# Patient Record
Sex: MALE | Race: WHITE | NOT HISPANIC OR LATINO | Employment: FULL TIME | URBAN - METROPOLITAN AREA
[De-identification: names, ages, dates, MRNs, and addresses within clinical notes are randomized per-mention and may not be internally consistent; named-entity substitution may affect disease eponyms.]

---

## 2022-07-18 ENCOUNTER — TELEPHONE (OUTPATIENT)
Dept: ADMINISTRATIVE | Facility: HOSPITAL | Age: 57
End: 2022-07-18

## 2022-07-18 DIAGNOSIS — Z00.00 WELLNESS EXAMINATION: Primary | ICD-10-CM

## 2022-07-18 NOTE — TELEPHONE ENCOUNTER
Type:  Needs Medical Advice    Who Called: self  Symptoms (please be specific): na   How long has patient had these symptoms:  na  Pharmacy name and phone #:  na  Would the patient rather a call back or a response via [facility]? Call back  Best Call Back Number: [phone]  Additional Information: np appt need labs

## 2022-08-09 ENCOUNTER — LAB VISIT (OUTPATIENT)
Dept: LAB | Facility: HOSPITAL | Age: 57
End: 2022-08-09
Attending: NURSE PRACTITIONER
Payer: MEDICAID

## 2022-08-09 DIAGNOSIS — Z00.00 WELLNESS EXAMINATION: ICD-10-CM

## 2022-08-09 LAB
ALBUMIN SERPL-MCNC: 3.6 GM/DL (ref 3.5–5)
ALBUMIN/GLOB SERPL: 0.9 RATIO (ref 1.1–2)
ALP SERPL-CCNC: 140 UNIT/L (ref 40–150)
ALT SERPL-CCNC: 26 UNIT/L (ref 0–55)
AST SERPL-CCNC: 17 UNIT/L (ref 5–34)
BASOPHILS # BLD AUTO: 0.03 X10(3)/MCL (ref 0–0.2)
BASOPHILS NFR BLD AUTO: 0.3 %
BILIRUBIN DIRECT+TOT PNL SERPL-MCNC: 0.2 MG/DL
BUN SERPL-MCNC: 15.2 MG/DL (ref 8.4–25.7)
CALCIUM SERPL-MCNC: 9.2 MG/DL (ref 8.4–10.2)
CHLORIDE SERPL-SCNC: 107 MMOL/L (ref 98–107)
CHOLEST SERPL-MCNC: 279 MG/DL
CHOLEST/HDLC SERPL: 9 {RATIO} (ref 0–5)
CO2 SERPL-SCNC: 21 MMOL/L (ref 22–29)
CREAT SERPL-MCNC: 0.72 MG/DL (ref 0.73–1.18)
DEPRECATED CALCIDIOL+CALCIFEROL SERPL-MC: 20.7 NG/ML (ref 30–80)
EOSINOPHIL # BLD AUTO: 0.37 X10(3)/MCL (ref 0–0.9)
EOSINOPHIL NFR BLD AUTO: 3.3 %
ERYTHROCYTE [DISTWIDTH] IN BLOOD BY AUTOMATED COUNT: 11.8 % (ref 11.5–17)
EST. AVERAGE GLUCOSE BLD GHB EST-MCNC: 99.7 MG/DL
GFR SERPLBLD CREATININE-BSD FMLA CKD-EPI: >60 MLS/MIN/1.73/M2
GLOBULIN SER-MCNC: 3.8 GM/DL (ref 2.4–3.5)
GLUCOSE SERPL-MCNC: 108 MG/DL (ref 74–100)
HBA1C MFR BLD: 5.1 %
HCT VFR BLD AUTO: 50 % (ref 42–52)
HDLC SERPL-MCNC: 31 MG/DL (ref 35–60)
HGB BLD-MCNC: 16.5 GM/DL (ref 14–18)
IMM GRANULOCYTES # BLD AUTO: 0.03 X10(3)/MCL (ref 0–0.04)
IMM GRANULOCYTES NFR BLD AUTO: 0.3 %
LDLC SERPL CALC-MCNC: 102 MG/DL (ref 50–140)
LYMPHOCYTES # BLD AUTO: 3.23 X10(3)/MCL (ref 0.6–4.6)
LYMPHOCYTES NFR BLD AUTO: 28.8 %
MCH RBC QN AUTO: 30.2 PG (ref 27–31)
MCHC RBC AUTO-ENTMCNC: 33 MG/DL (ref 33–36)
MCV RBC AUTO: 91.4 FL (ref 80–94)
MONOCYTES # BLD AUTO: 0.86 X10(3)/MCL (ref 0.1–1.3)
MONOCYTES NFR BLD AUTO: 7.7 %
NEUTROPHILS # BLD AUTO: 6.7 X10(3)/MCL (ref 2.1–9.2)
NEUTROPHILS NFR BLD AUTO: 59.6 %
PLATELET # BLD AUTO: 377 X10(3)/MCL (ref 130–400)
PMV BLD AUTO: 9.6 FL (ref 7.4–10.4)
POTASSIUM SERPL-SCNC: 4.2 MMOL/L (ref 3.5–5.1)
PROT SERPL-MCNC: 7.4 GM/DL (ref 6.4–8.3)
RBC # BLD AUTO: 5.47 X10(6)/MCL (ref 4.7–6.1)
SODIUM SERPL-SCNC: 143 MMOL/L (ref 136–145)
TRIGL SERPL-MCNC: 731 MG/DL (ref 34–140)
TSH SERPL-ACNC: 0.95 UIU/ML (ref 0.35–4.94)
VLDLC SERPL CALC-MCNC: 146 MG/DL
WBC # SPEC AUTO: 11.2 X10(3)/MCL (ref 4.5–11.5)

## 2022-08-09 PROCEDURE — 80053 COMPREHEN METABOLIC PANEL: CPT

## 2022-08-09 PROCEDURE — 82306 VITAMIN D 25 HYDROXY: CPT

## 2022-08-09 PROCEDURE — 85025 COMPLETE CBC W/AUTO DIFF WBC: CPT

## 2022-08-09 PROCEDURE — 83036 HEMOGLOBIN GLYCOSYLATED A1C: CPT

## 2022-08-09 PROCEDURE — 36415 COLL VENOUS BLD VENIPUNCTURE: CPT

## 2022-08-09 PROCEDURE — 80061 LIPID PANEL: CPT

## 2022-08-09 PROCEDURE — 84443 ASSAY THYROID STIM HORMONE: CPT

## 2022-08-11 ENCOUNTER — OFFICE VISIT (OUTPATIENT)
Dept: FAMILY MEDICINE | Facility: CLINIC | Age: 57
End: 2022-08-11
Payer: MEDICAID

## 2022-08-11 VITALS
WEIGHT: 165 LBS | OXYGEN SATURATION: 99 % | RESPIRATION RATE: 16 BRPM | HEART RATE: 85 BPM | SYSTOLIC BLOOD PRESSURE: 135 MMHG | DIASTOLIC BLOOD PRESSURE: 79 MMHG | BODY MASS INDEX: 26.52 KG/M2 | HEIGHT: 66 IN | TEMPERATURE: 98 F

## 2022-08-11 DIAGNOSIS — E78.00 HYPERCHOLESTEREMIA: ICD-10-CM

## 2022-08-11 DIAGNOSIS — Z12.12 ENCOUNTER FOR COLORECTAL CANCER SCREENING: Primary | ICD-10-CM

## 2022-08-11 DIAGNOSIS — E78.1 HYPERTRIGLYCERIDEMIA: ICD-10-CM

## 2022-08-11 DIAGNOSIS — Z12.11 ENCOUNTER FOR COLORECTAL CANCER SCREENING: Primary | ICD-10-CM

## 2022-08-11 DIAGNOSIS — Z00.00 WELLNESS EXAMINATION: ICD-10-CM

## 2022-08-11 DIAGNOSIS — I10 HYPERTENSION, UNSPECIFIED TYPE: ICD-10-CM

## 2022-08-11 DIAGNOSIS — F32.A DEPRESSION, UNSPECIFIED DEPRESSION TYPE: ICD-10-CM

## 2022-08-11 DIAGNOSIS — Z76.89 ENCOUNTER TO ESTABLISH CARE: ICD-10-CM

## 2022-08-11 PROCEDURE — 3008F BODY MASS INDEX DOCD: CPT | Mod: CPTII,,, | Performed by: NURSE PRACTITIONER

## 2022-08-11 PROCEDURE — 3075F SYST BP GE 130 - 139MM HG: CPT | Mod: CPTII,,, | Performed by: NURSE PRACTITIONER

## 2022-08-11 PROCEDURE — 99386 PREV VISIT NEW AGE 40-64: CPT | Mod: ,,, | Performed by: NURSE PRACTITIONER

## 2022-08-11 PROCEDURE — 3078F PR MOST RECENT DIASTOLIC BLOOD PRESSURE < 80 MM HG: ICD-10-PCS | Mod: CPTII,,, | Performed by: NURSE PRACTITIONER

## 2022-08-11 PROCEDURE — 3008F PR BODY MASS INDEX (BMI) DOCUMENTED: ICD-10-PCS | Mod: CPTII,,, | Performed by: NURSE PRACTITIONER

## 2022-08-11 PROCEDURE — 3075F PR MOST RECENT SYSTOLIC BLOOD PRESS GE 130-139MM HG: ICD-10-PCS | Mod: CPTII,,, | Performed by: NURSE PRACTITIONER

## 2022-08-11 PROCEDURE — 3078F DIAST BP <80 MM HG: CPT | Mod: CPTII,,, | Performed by: NURSE PRACTITIONER

## 2022-08-11 PROCEDURE — 99386 PR PREVENTIVE VISIT,NEW,40-64: ICD-10-PCS | Mod: ,,, | Performed by: NURSE PRACTITIONER

## 2022-08-11 RX ORDER — SIMVASTATIN 40 MG/1
40 TABLET, FILM COATED ORAL NIGHTLY
Qty: 30 TABLET | Refills: 11 | Status: SHIPPED | OUTPATIENT
Start: 2022-08-11

## 2022-08-11 RX ORDER — FENOFIBRATE 160 MG/1
160 TABLET ORAL DAILY
Qty: 90 TABLET | Refills: 3 | Status: SHIPPED | OUTPATIENT
Start: 2022-08-11 | End: 2023-09-07

## 2022-08-11 RX ORDER — METOPROLOL TARTRATE 25 MG/1
12.5 TABLET, FILM COATED ORAL DAILY
Qty: 30 TABLET | Refills: 11 | Status: SHIPPED | OUTPATIENT
Start: 2022-08-11

## 2022-08-11 RX ORDER — SIMVASTATIN 40 MG/1
1 TABLET, FILM COATED ORAL NIGHTLY
COMMUNITY
Start: 2022-06-14 | End: 2022-08-11 | Stop reason: SDUPTHER

## 2022-08-11 RX ORDER — METOPROLOL TARTRATE 25 MG/1
12.5 TABLET, FILM COATED ORAL DAILY
COMMUNITY
Start: 2022-06-14 | End: 2022-08-11 | Stop reason: SDUPTHER

## 2022-08-11 RX ORDER — ASPIRIN 81 MG/1
81 TABLET ORAL DAILY
COMMUNITY

## 2022-08-11 NOTE — PROGRESS NOTES
Of the fall are history Subjective:       Patient ID: Kimani Redd is a 57 y.o. male.    Chief Complaint: Establish Care and Cyst (To JUAN MANUEL MENG, present for several years. Painless.)    This is a 57-year-old male who presents to the clinic to establish care.  Patient has a past medical history of CAD, mi with stenting hyper lipidemia, hypertension.  Patient has a past surgical history of back surgery.  Patient reports some depression due to family concerns.  Denies any suicidal or homicidal ideations.    Review of Systems   Constitutional: Negative.    HENT: Negative.    Eyes: Negative.    Respiratory: Negative.    Cardiovascular: Negative.    Gastrointestinal: Negative.    Endocrine: Negative.    Genitourinary: Negative.    Musculoskeletal: Positive for back pain.   Integumentary:  Negative.   Allergic/Immunologic: Negative.    Neurological: Negative.    Hematological: Negative.    Psychiatric/Behavioral: Positive for dysphoric mood.         Objective:      Physical Exam  Vitals and nursing note reviewed.   Constitutional:       Appearance: Normal appearance.   HENT:      Head: Normocephalic and atraumatic.      Right Ear: Ear canal and external ear normal.      Left Ear: Ear canal and external ear normal.      Nose: Nose normal.      Mouth/Throat:      Mouth: Mucous membranes are moist.      Pharynx: Oropharynx is clear.   Eyes:      Extraocular Movements: Extraocular movements intact.      Conjunctiva/sclera: Conjunctivae normal.      Pupils: Pupils are equal, round, and reactive to light.   Cardiovascular:      Rate and Rhythm: Normal rate and regular rhythm.      Pulses: Normal pulses.      Heart sounds: Normal heart sounds.   Pulmonary:      Effort: Pulmonary effort is normal.      Breath sounds: Normal breath sounds.   Abdominal:      General: Abdomen is flat. Bowel sounds are normal.      Palpations: Abdomen is soft.   Musculoskeletal:         General: Normal range of motion.      Cervical back: Normal range of  motion and neck supple.   Skin:     General: Skin is warm and dry.      Capillary Refill: Capillary refill takes less than 2 seconds.   Neurological:      General: No focal deficit present.      Mental Status: He is alert and oriented to person, place, and time. Mental status is at baseline.   Psychiatric:         Mood and Affect: Mood normal.         Behavior: Behavior normal.         Thought Content: Thought content normal.         Judgment: Judgment normal.         Results for orders placed or performed in visit on 08/09/22   Comprehensive Metabolic Panel   Result Value Ref Range    Sodium Level 143 136 - 145 mmol/L    Potassium Level 4.2 3.5 - 5.1 mmol/L    Chloride 107 98 - 107 mmol/L    Carbon Dioxide 21 (L) 22 - 29 mmol/L    Glucose Level 108 (H) 74 - 100 mg/dL    Blood Urea Nitrogen 15.2 8.4 - 25.7 mg/dL    Creatinine 0.72 (L) 0.73 - 1.18 mg/dL    Calcium Level Total 9.2 8.4 - 10.2 mg/dL    Protein Total 7.4 6.4 - 8.3 gm/dL    Albumin Level 3.6 3.5 - 5.0 gm/dL    Globulin 3.8 (H) 2.4 - 3.5 gm/dL    Albumin/Globulin Ratio 0.9 (L) 1.1 - 2.0 ratio    Bilirubin Total 0.2 <=1.5 mg/dL    Alkaline Phosphatase 140 40 - 150 unit/L    Alanine Aminotransferase 26 0 - 55 unit/L    Aspartate Aminotransferase 17 5 - 34 unit/L    eGFR >60 mls/min/1.73/m2   Lipid Panel   Result Value Ref Range    Cholesterol Total 279 (H) <=200 mg/dL    HDL Cholesterol 31 (L) 35 - 60 mg/dL    Triglyceride 731 (H) 34 - 140 mg/dL    Cholesterol/HDL Ratio 9 (H) 0 - 5    Very Low Density Lipoprotein 146     LDL Cholesterol 102.00 50.00 - 140.00 mg/dL   TSH   Result Value Ref Range    Thyroid Stimulating Hormone 0.9500 0.3500 - 4.9400 uIU/mL   Vitamin D   Result Value Ref Range    Vit D 25 OH 20.7 (L) 30.0 - 80.0 ng/mL   Hemoglobin A1C   Result Value Ref Range    Hemoglobin A1c 5.1 <=7.0 %    Estimated Average Glucose 99.7 mg/dL   CBC with Differential   Result Value Ref Range    WBC 11.2 4.5 - 11.5 x10(3)/mcL    RBC 5.47 4.70 - 6.10 x10(6)/mcL     Hgb 16.5 14.0 - 18.0 gm/dL    Hct 50.0 42.0 - 52.0 %    MCV 91.4 80.0 - 94.0 fL    MCH 30.2 27.0 - 31.0 pg    MCHC 33.0 33.0 - 36.0 mg/dL    RDW 11.8 11.5 - 17.0 %    Platelet 377 130 - 400 x10(3)/mcL    MPV 9.6 7.4 - 10.4 fL    Neut % 59.6 %    Lymph % 28.8 %    Mono % 7.7 %    Eos % 3.3 %    Basophil % 0.3 %    Lymph # 3.23 0.6 - 4.6 x10(3)/mcL    Neut # 6.7 2.1 - 9.2 x10(3)/mcL    Mono # 0.86 0.1 - 1.3 x10(3)/mcL    Eos # 0.37 0 - 0.9 x10(3)/mcL    Baso # 0.03 0 - 0.2 x10(3)/mcL    IG# 0.03 0 - 0.04 x10(3)/mcL    IG% 0.3 %       Assessment:       Problem List Items Addressed This Visit        Psychiatric    Depression     Patient declines counseling or medication management at this time.  Prefers to cope naturally.  Positive support system encouraged.  Instructed patient to report to ED with any suicidal or homicidal ideations.              Cardiac/Vascular    Hypercholesteremia    Hypertriglyceridemia     Fenofibrate 160 mg p.o. daily sent to pharmacy.  Low-fat, low-cholesterol diet advised.           Hypertension     Blood pressure 135/79.  Dash diet advised.  Continue current management.              Other    Encounter to establish care    Wellness examination     Healthy lifestyle discussed. Cloguard ordered.             Other Visit Diagnoses     Encounter for colorectal cancer screening    -  Primary    Relevant Orders    Cologuard Screening (Multitarget Stool DNA)          Plan:       RTC in 3 months. Lipid panel to be completed prior to appt.

## 2022-08-11 NOTE — ASSESSMENT & PLAN NOTE
Patient declines counseling or medication management at this time.  Prefers to cope naturally.  Positive support system encouraged.  Instructed patient to report to ED with any suicidal or homicidal ideations.

## 2022-11-08 DIAGNOSIS — E78.5 HYPERLIPIDEMIA, UNSPECIFIED HYPERLIPIDEMIA TYPE: Primary | ICD-10-CM

## 2022-11-14 PROBLEM — Z00.00 WELLNESS EXAMINATION: Status: RESOLVED | Noted: 2022-08-11 | Resolved: 2022-11-14

## 2023-06-13 ENCOUNTER — TELEPHONE (OUTPATIENT)
Dept: FAMILY MEDICINE | Facility: CLINIC | Age: 58
End: 2023-06-13
Payer: MEDICAID

## 2023-06-13 DIAGNOSIS — Z00.00 WELLNESS EXAMINATION: Primary | ICD-10-CM

## 2023-09-02 ENCOUNTER — LAB VISIT (OUTPATIENT)
Dept: LAB | Facility: HOSPITAL | Age: 58
End: 2023-09-02
Attending: NURSE PRACTITIONER
Payer: MEDICAID

## 2023-09-02 DIAGNOSIS — E78.5 HYPERLIPIDEMIA, UNSPECIFIED HYPERLIPIDEMIA TYPE: ICD-10-CM

## 2023-09-02 DIAGNOSIS — Z00.00 WELLNESS EXAMINATION: ICD-10-CM

## 2023-09-02 LAB
ALBUMIN SERPL-MCNC: 4 G/DL (ref 3.5–5)
ALBUMIN/GLOB SERPL: 1.1 RATIO (ref 1.1–2)
ALP SERPL-CCNC: 94 UNIT/L (ref 40–150)
ALT SERPL-CCNC: 19 UNIT/L (ref 0–55)
AST SERPL-CCNC: 19 UNIT/L (ref 5–34)
BASOPHILS # BLD AUTO: 0.03 X10(3)/MCL
BASOPHILS NFR BLD AUTO: 0.2 %
BILIRUB SERPL-MCNC: 0.3 MG/DL
BUN SERPL-MCNC: 22.1 MG/DL (ref 8.4–25.7)
CALCIUM SERPL-MCNC: 9.5 MG/DL (ref 8.4–10.2)
CHLORIDE SERPL-SCNC: 106 MMOL/L (ref 98–107)
CHOLEST SERPL-MCNC: 218 MG/DL
CHOLEST/HDLC SERPL: 3 {RATIO} (ref 0–5)
CO2 SERPL-SCNC: 23 MMOL/L (ref 22–29)
CREAT SERPL-MCNC: 1.05 MG/DL (ref 0.73–1.18)
DEPRECATED CALCIDIOL+CALCIFEROL SERPL-MC: 22 NG/ML (ref 30–80)
EOSINOPHIL # BLD AUTO: 0.27 X10(3)/MCL (ref 0–0.9)
EOSINOPHIL NFR BLD AUTO: 1.7 %
ERYTHROCYTE [DISTWIDTH] IN BLOOD BY AUTOMATED COUNT: 12.4 % (ref 11.5–17)
EST. AVERAGE GLUCOSE BLD GHB EST-MCNC: 96.8 MG/DL
GFR SERPLBLD CREATININE-BSD FMLA CKD-EPI: >60 MLS/MIN/1.73/M2
GLOBULIN SER-MCNC: 3.6 GM/DL (ref 2.4–3.5)
GLUCOSE SERPL-MCNC: 99 MG/DL (ref 74–100)
HBA1C MFR BLD: 5 %
HCT VFR BLD AUTO: 49.8 % (ref 42–52)
HDLC SERPL-MCNC: 76 MG/DL (ref 35–60)
HGB BLD-MCNC: 16 G/DL (ref 14–18)
IMM GRANULOCYTES # BLD AUTO: 0.02 X10(3)/MCL (ref 0–0.04)
IMM GRANULOCYTES NFR BLD AUTO: 0.1 %
LDLC SERPL CALC-MCNC: 119 MG/DL (ref 50–140)
LYMPHOCYTES # BLD AUTO: 3.16 X10(3)/MCL (ref 0.6–4.6)
LYMPHOCYTES NFR BLD AUTO: 20.2 %
MCH RBC QN AUTO: 29 PG (ref 27–31)
MCHC RBC AUTO-ENTMCNC: 32.1 G/DL (ref 33–36)
MCV RBC AUTO: 90.4 FL (ref 80–94)
MONOCYTES # BLD AUTO: 1.12 X10(3)/MCL (ref 0.1–1.3)
MONOCYTES NFR BLD AUTO: 7.2 %
NEUTROPHILS # BLD AUTO: 11.05 X10(3)/MCL (ref 2.1–9.2)
NEUTROPHILS NFR BLD AUTO: 70.6 %
PLATELET # BLD AUTO: 455 X10(3)/MCL (ref 130–400)
PMV BLD AUTO: 9.8 FL (ref 7.4–10.4)
POTASSIUM SERPL-SCNC: 4.4 MMOL/L (ref 3.5–5.1)
PROT SERPL-MCNC: 7.6 GM/DL (ref 6.4–8.3)
PSA SERPL-MCNC: 0.83 NG/ML
RBC # BLD AUTO: 5.51 X10(6)/MCL (ref 4.7–6.1)
SODIUM SERPL-SCNC: 138 MMOL/L (ref 136–145)
TRIGL SERPL-MCNC: 116 MG/DL (ref 34–140)
TSH SERPL-ACNC: 0.85 UIU/ML (ref 0.35–4.94)
VLDLC SERPL CALC-MCNC: 23 MG/DL
WBC # SPEC AUTO: 15.65 X10(3)/MCL (ref 4.5–11.5)

## 2023-09-02 PROCEDURE — 82306 VITAMIN D 25 HYDROXY: CPT

## 2023-09-02 PROCEDURE — 84153 ASSAY OF PSA TOTAL: CPT

## 2023-09-02 PROCEDURE — 83036 HEMOGLOBIN GLYCOSYLATED A1C: CPT

## 2023-09-02 PROCEDURE — 85025 COMPLETE CBC W/AUTO DIFF WBC: CPT

## 2023-09-02 PROCEDURE — 80061 LIPID PANEL: CPT

## 2023-09-02 PROCEDURE — 84443 ASSAY THYROID STIM HORMONE: CPT

## 2023-09-02 PROCEDURE — 36415 COLL VENOUS BLD VENIPUNCTURE: CPT

## 2023-09-02 PROCEDURE — 80053 COMPREHEN METABOLIC PANEL: CPT

## 2023-09-07 ENCOUNTER — OFFICE VISIT (OUTPATIENT)
Dept: FAMILY MEDICINE | Facility: CLINIC | Age: 58
End: 2023-09-07
Payer: MEDICAID

## 2023-09-07 VITALS
BODY MASS INDEX: 25.36 KG/M2 | WEIGHT: 157.81 LBS | SYSTOLIC BLOOD PRESSURE: 119 MMHG | HEART RATE: 67 BPM | HEIGHT: 66 IN | TEMPERATURE: 98 F | OXYGEN SATURATION: 99 % | RESPIRATION RATE: 18 BRPM | DIASTOLIC BLOOD PRESSURE: 70 MMHG

## 2023-09-07 DIAGNOSIS — M19.90 ARTHRITIS: ICD-10-CM

## 2023-09-07 DIAGNOSIS — I10 HYPERTENSION, UNSPECIFIED TYPE: ICD-10-CM

## 2023-09-07 DIAGNOSIS — Z12.12 SCREENING FOR COLORECTAL CANCER: ICD-10-CM

## 2023-09-07 DIAGNOSIS — Z13.39 ADHD (ATTENTION DEFICIT HYPERACTIVITY DISORDER) EVALUATION: Primary | ICD-10-CM

## 2023-09-07 DIAGNOSIS — E78.00 HYPERCHOLESTEREMIA: ICD-10-CM

## 2023-09-07 DIAGNOSIS — D72.829 LEUKOCYTOSIS, UNSPECIFIED TYPE: ICD-10-CM

## 2023-09-07 DIAGNOSIS — Z00.00 WELL ADULT EXAM: ICD-10-CM

## 2023-09-07 DIAGNOSIS — Z12.11 SCREENING FOR COLORECTAL CANCER: ICD-10-CM

## 2023-09-07 PROCEDURE — 1159F MED LIST DOCD IN RCRD: CPT | Mod: CPTII,,, | Performed by: NURSE PRACTITIONER

## 2023-09-07 PROCEDURE — 99212 PR OFFICE/OUTPT VISIT, EST, LEVL II, 10-19 MIN: ICD-10-PCS | Mod: 25,,, | Performed by: NURSE PRACTITIONER

## 2023-09-07 PROCEDURE — 1160F RVW MEDS BY RX/DR IN RCRD: CPT | Mod: CPTII,,, | Performed by: NURSE PRACTITIONER

## 2023-09-07 PROCEDURE — 99212 OFFICE O/P EST SF 10 MIN: CPT | Mod: 25,,, | Performed by: NURSE PRACTITIONER

## 2023-09-07 PROCEDURE — 3008F PR BODY MASS INDEX (BMI) DOCUMENTED: ICD-10-PCS | Mod: CPTII,,, | Performed by: NURSE PRACTITIONER

## 2023-09-07 PROCEDURE — 3008F BODY MASS INDEX DOCD: CPT | Mod: CPTII,,, | Performed by: NURSE PRACTITIONER

## 2023-09-07 PROCEDURE — 3044F HG A1C LEVEL LT 7.0%: CPT | Mod: CPTII,,, | Performed by: NURSE PRACTITIONER

## 2023-09-07 PROCEDURE — 3044F PR MOST RECENT HEMOGLOBIN A1C LEVEL <7.0%: ICD-10-PCS | Mod: CPTII,,, | Performed by: NURSE PRACTITIONER

## 2023-09-07 PROCEDURE — 1160F PR REVIEW ALL MEDS BY PRESCRIBER/CLIN PHARMACIST DOCUMENTED: ICD-10-PCS | Mod: CPTII,,, | Performed by: NURSE PRACTITIONER

## 2023-09-07 PROCEDURE — 3074F SYST BP LT 130 MM HG: CPT | Mod: CPTII,,, | Performed by: NURSE PRACTITIONER

## 2023-09-07 PROCEDURE — 3074F PR MOST RECENT SYSTOLIC BLOOD PRESSURE < 130 MM HG: ICD-10-PCS | Mod: CPTII,,, | Performed by: NURSE PRACTITIONER

## 2023-09-07 PROCEDURE — 3078F DIAST BP <80 MM HG: CPT | Mod: CPTII,,, | Performed by: NURSE PRACTITIONER

## 2023-09-07 PROCEDURE — 99396 PR PREVENTIVE VISIT,EST,40-64: ICD-10-PCS | Mod: ,,, | Performed by: NURSE PRACTITIONER

## 2023-09-07 PROCEDURE — 3078F PR MOST RECENT DIASTOLIC BLOOD PRESSURE < 80 MM HG: ICD-10-PCS | Mod: CPTII,,, | Performed by: NURSE PRACTITIONER

## 2023-09-07 PROCEDURE — 1159F PR MEDICATION LIST DOCUMENTED IN MEDICAL RECORD: ICD-10-PCS | Mod: CPTII,,, | Performed by: NURSE PRACTITIONER

## 2023-09-07 PROCEDURE — 99396 PREV VISIT EST AGE 40-64: CPT | Mod: ,,, | Performed by: NURSE PRACTITIONER

## 2023-09-07 RX ORDER — MELOXICAM 15 MG/1
15 TABLET ORAL DAILY
Qty: 30 TABLET | Refills: 6 | Status: SHIPPED | OUTPATIENT
Start: 2023-09-07

## 2023-09-07 RX ORDER — ASPIRIN 325 MG
50000 TABLET, DELAYED RELEASE (ENTERIC COATED) ORAL WEEKLY
Qty: 12 CAPSULE | Refills: 0 | Status: SHIPPED | OUTPATIENT
Start: 2023-09-07 | End: 2023-11-24

## 2023-09-07 NOTE — PROGRESS NOTES
Patient ID: [id number]     Chief Complaint: Annual Exam      HPI:   Kimani Redd is a 58 y.o. male here today for an annual wellness visit.  Presents with complaints of inability to focus and complete tasks in a timely manner while at work.  Patient also presents with complaints of bilateral elbow pain.  Patient relates pain due to previous all feel work.  Denies any trauma or injury.  Pain is worse with movement, relieved with rest.  Pain is relieved with ibuprofen OTC.      Health Maintenance   Topic Date Due    Hepatitis C Screening  Never done    TETANUS VACCINE  Never done    Colorectal Cancer Screening  Never done    Shingles Vaccine (1 of 2) Never done    Lipid Panel  09/02/2028     Dental Exam - Recommend biannually  Vaccinations -   There is no immunization history on file for this patient.     Past Medical History:  has no past medical history on file.    Surgical History:  has a past surgical history that includes Carotid stent.    Family History: family history is not on file.    Social History:  reports that he has been smoking cigarettes. He has never used smokeless tobacco. He reports that he does not drink alcohol and does not use drugs.    Current Outpatient Medications   Medication Instructions    aspirin (ECOTRIN) 81 mg, Oral, Daily    cholecalciferol (vitamin D3) 50,000 Units, Oral, Weekly    fenofibrate 160 mg, Oral, Daily    meloxicam (MOBIC) 15 mg, Oral, Daily    metoprolol tartrate (LOPRESSOR) 12.5 mg, Oral, Daily    simvastatin (ZOCOR) 40 mg, Oral, Nightly       Patient has No Known Allergies.     Patient Care Team:  Jessi Leyva FNP as PCP - General (Nurse Practitioner)       Subjective:     Review of Systems    12 point review of systems conducted, negative except as stated in the history of present illness. See HPI for details.      Objective:     Visit Vitals  /70 (BP Location: Left arm, Patient Position: Sitting)   Pulse 67   Temp 97.7 °F (36.5 °C) (Oral)   Resp 18   Ht  "5' 6" (1.676 m)   Wt 71.6 kg (157 lb 12.8 oz)   SpO2 99%   BMI 25.47 kg/m²       Physical Exam    General: Alert and oriented, No acute distress.  Head: Normocephalic, Atraumatic.  Eye: Pupils are equal, round and reactive to light, Extraocular movements are intact, Sclera non-icteric.  Ears/Nose/Throat: Normal, Mucosa moist,Clear.  Neck/Thyroid: Supple, Non-tender, No carotid bruit, No lymphadenopathy, No JVD, Full range of motion.  Respiratory: Clear to auscultation bilaterally; No wheezes, rales or rhonchi,Non-labored respirations, Symmetrical chest wall expansion.  Cardiovascular: Regular rate and rhythm, S1/S2 normal, No murmurs, rubs or gallops.  Gastrointestinal: Soft, Non-tender, Non-distended, Normal bowel sounds, No palpable organomegaly.  Musculoskeletal: Normal range of motion.  Integumentary: Warm, Dry, Intact, No suspicious lesions or rashes.  Extremities: No clubbing, cyanosis or edema  Neurologic: No focal deficits, Cranial Nerves II-XII are grossly intact, Motor strength normal upper and lower extremities, Sensory exam intact.  Psychiatric: Anxious, Coherent speech, Euthymic mood, Appropriate affect     Labs Reviewed:     Chemistry:  Lab Results   Component Value Date     09/02/2023    K 4.4 09/02/2023    CHLORIDE 106 09/02/2023    BUN 22.1 09/02/2023    CREATININE 1.05 09/02/2023    EGFRNORACEVR >60 09/02/2023    GLUCOSE 99 09/02/2023    CALCIUM 9.5 09/02/2023    ALKPHOS 94 09/02/2023    LABPROT 7.6 09/02/2023    ALBUMIN 4.0 09/02/2023    AST 19 09/02/2023    ALT 19 09/02/2023    [id number] 22.0 (L) 09/02/2023    TSH 0.851 09/02/2023    PSA 0.83 09/02/2023        Lab Results   Component Value Date    HGBA1C 5.0 09/02/2023        Hematology:  Lab Results   Component Value Date    WBC 15.65 (H) 09/02/2023    HGB 16.0 09/02/2023    HCT 49.8 09/02/2023     (H) 09/02/2023       Lipid Panel:  Lab Results   Component Value Date    CHOL 218 (H) 09/02/2023    HDL 76 (H) 09/02/2023    LDL " "119.00 09/02/2023    TRIG 116 09/02/2023    TOTALCHOLEST 3 09/02/2023        Urine:  No results found for: "COLORUA", "APPEARANCEUA", "SGUA", "PHUA", "PROTEINUA", "GLUCOSEUA", "KETONESUA", "BLOODUA", "NITRITESUA", "LEUKOCYTESUR", "RBCUA", "WBCUA", "BACTERIA", "SQEPUA", "HYALINECASTS", "CREATRANDUR", "PROTEINURINE", "UPROTCREA"       Assessment:       ICD-10-CM ICD-9-CM   1. Well adult exam  Z00.00 V70.0   2. Hypertension, unspecified type  I10 401.9   3. Hypercholesteremia  E78.00 272.0   4. ADHD (attention deficit hyperactivity disorder) evaluation  Z13.39 V79.8   5. Leukocytosis, unspecified type  D72.829 288.60   6. Screening for colorectal cancer  Z12.11 V76.51    Z12.12 V76.41        Plan:   1. Well adult exam  Healthy lifestyle discussed.  Positive support system encouraged.    2. Hypertension, unspecified type  Well controlled.   Low Sodium Diet (DASH Diet - Less than 2 grams of sodium per day).  Monitor blood pressure daily and log. Report consistent numbers greater than 140/90.  Maintain healthy weight with goal BMI <30. Exercise 30 minutes per day, 5 days per week.  Smoking cessation encouraged to aid in BP reduction.    3. Hypercholesteremia  Lab Results   Component Value Date    .00 09/02/2023    TRIG 116 09/02/2023    HDL 76 (H) 09/02/2023    TOTALCHOLEST 3 09/02/2023     Continue   Hyperlipidemia Medications               fenofibrate 160 MG Tab Take 1 tablet (160 mg total) by mouth once daily.    simvastatin (ZOCOR) 40 MG tablet Take 1 tablet (40 mg total) by mouth every evening.        Stressed importance of dietary modifications. Follow a low cholesterol, low saturated fat diet with less that 200mg of cholesterol a day.  Avoid fried foods and high saturated fats (high saturated fats less than 7% of calories).  Add Flax Seed/Fish Oil supplements to diet. Increase dietary fiber.  Regular exercise can reduce LDL and raise HDL. Stressed importance of physical activity 5 times per week for 30 " minutes per day.     4. ADHD (attention deficit hyperactivity disorder) evaluation  Referral sent to Mental Health.    5. Leukocytosis, unspecified type  WBC 15.6.  Patient asymptomatic.  Repeat CBC in 1 week.  - CBC Auto Differential; Future    6. Screening for colorectal cancer  - Cologuard Screening (Multitarget Stool DNA); Future  - Cologuard Screening (Multitarget Stool DNA)    7. Arthritis  Meloxicam 15 mg p.o. daily sent to pharmacy.  Return to clinic if symptoms do not improve.        Follow up in about 6 months (around 3/7/2024). In addition to their scheduled follow up, the patient has also been instructed to follow up on as needed basis.     Future Appointments   Date Time Provider Department Center   3/7/2024  8:00 AM Jessi Leyva FNP [facility]        KAYA Rodriguez

## 2023-10-28 DIAGNOSIS — I10 HYPERTENSION, UNSPECIFIED TYPE: Primary | ICD-10-CM

## 2023-10-28 DIAGNOSIS — E78.00 ELEVATED CHOLESTEROL: ICD-10-CM

## 2023-10-30 RX ORDER — METOPROLOL TARTRATE 25 MG/1
12.5 TABLET, FILM COATED ORAL
Qty: 30 TABLET | Refills: 11 | Status: SHIPPED | OUTPATIENT
Start: 2023-10-30

## 2023-10-30 RX ORDER — FENOFIBRATE 160 MG/1
160 TABLET ORAL
Qty: 90 TABLET | Refills: 3 | Status: SHIPPED | OUTPATIENT
Start: 2023-10-30

## 2023-10-30 RX ORDER — SIMVASTATIN 40 MG/1
40 TABLET, FILM COATED ORAL NIGHTLY
Qty: 30 TABLET | Refills: 11 | Status: SHIPPED | OUTPATIENT
Start: 2023-10-30

## 2024-02-23 DIAGNOSIS — E78.5 HYPERLIPIDEMIA, UNSPECIFIED HYPERLIPIDEMIA TYPE: Primary | ICD-10-CM

## 2024-02-29 ENCOUNTER — LAB VISIT (OUTPATIENT)
Dept: LAB | Facility: HOSPITAL | Age: 59
End: 2024-02-29
Attending: NURSE PRACTITIONER
Payer: MEDICAID

## 2024-02-29 DIAGNOSIS — D72.829 LEUKOCYTOSIS, UNSPECIFIED TYPE: ICD-10-CM

## 2024-02-29 DIAGNOSIS — E78.5 HYPERLIPIDEMIA, UNSPECIFIED HYPERLIPIDEMIA TYPE: ICD-10-CM

## 2024-02-29 LAB
BASOPHILS # BLD AUTO: 0.02 X10(3)/MCL
BASOPHILS NFR BLD AUTO: 0.2 %
CHOLEST SERPL-MCNC: 206 MG/DL
CHOLEST/HDLC SERPL: 5 {RATIO} (ref 0–5)
EOSINOPHIL # BLD AUTO: 0.21 X10(3)/MCL (ref 0–0.9)
EOSINOPHIL NFR BLD AUTO: 2 %
ERYTHROCYTE [DISTWIDTH] IN BLOOD BY AUTOMATED COUNT: 12.5 % (ref 11.5–17)
HCT VFR BLD AUTO: 52.1 % (ref 42–52)
HDLC SERPL-MCNC: 44 MG/DL (ref 35–60)
HGB BLD-MCNC: 16.7 G/DL (ref 14–18)
IMM GRANULOCYTES # BLD AUTO: 0.02 X10(3)/MCL (ref 0–0.04)
IMM GRANULOCYTES NFR BLD AUTO: 0.2 %
LDLC SERPL CALC-MCNC: 131 MG/DL (ref 50–140)
LYMPHOCYTES # BLD AUTO: 2.61 X10(3)/MCL (ref 0.6–4.6)
LYMPHOCYTES NFR BLD AUTO: 25.5 %
MCH RBC QN AUTO: 29.3 PG (ref 27–31)
MCHC RBC AUTO-ENTMCNC: 32.1 G/DL (ref 33–36)
MCV RBC AUTO: 91.4 FL (ref 80–94)
MONOCYTES # BLD AUTO: 1.04 X10(3)/MCL (ref 0.1–1.3)
MONOCYTES NFR BLD AUTO: 10.1 %
NEUTROPHILS # BLD AUTO: 6.35 X10(3)/MCL (ref 2.1–9.2)
NEUTROPHILS NFR BLD AUTO: 62 %
PLATELET # BLD AUTO: 405 X10(3)/MCL (ref 130–400)
PMV BLD AUTO: 10.2 FL (ref 7.4–10.4)
RBC # BLD AUTO: 5.7 X10(6)/MCL (ref 4.7–6.1)
TRIGL SERPL-MCNC: 154 MG/DL (ref 34–140)
VLDLC SERPL CALC-MCNC: 31 MG/DL
WBC # SPEC AUTO: 10.25 X10(3)/MCL (ref 4.5–11.5)

## 2024-02-29 PROCEDURE — 80061 LIPID PANEL: CPT

## 2024-02-29 PROCEDURE — 85025 COMPLETE CBC W/AUTO DIFF WBC: CPT

## 2024-02-29 PROCEDURE — 36415 COLL VENOUS BLD VENIPUNCTURE: CPT

## 2024-05-07 ENCOUNTER — TELEPHONE (OUTPATIENT)
Dept: FAMILY MEDICINE | Facility: CLINIC | Age: 59
End: 2024-05-07
Payer: MEDICAID

## 2024-05-07 DIAGNOSIS — I10 HYPERTENSION, UNSPECIFIED TYPE: Primary | ICD-10-CM

## 2024-05-07 RX ORDER — METOPROLOL TARTRATE 25 MG/1
12.5 TABLET, FILM COATED ORAL DAILY
Qty: 30 TABLET | Refills: 0 | Status: SHIPPED | OUTPATIENT
Start: 2024-05-07

## 2024-05-08 ENCOUNTER — TELEPHONE (OUTPATIENT)
Dept: FAMILY MEDICINE | Facility: CLINIC | Age: 59
End: 2024-05-08
Payer: MEDICAID

## 2024-05-08 DIAGNOSIS — I10 HYPERTENSION, UNSPECIFIED TYPE: ICD-10-CM

## 2024-05-08 RX ORDER — METOPROLOL TARTRATE 25 MG/1
12.5 TABLET, FILM COATED ORAL DAILY
Qty: 30 TABLET | Refills: 0 | Status: CANCELLED | OUTPATIENT
Start: 2024-05-08

## 2024-06-11 DIAGNOSIS — E78.00 HYPERCHOLESTEREMIA: Primary | ICD-10-CM

## 2024-06-22 ENCOUNTER — LAB VISIT (OUTPATIENT)
Dept: LAB | Facility: HOSPITAL | Age: 59
End: 2024-06-22
Attending: NURSE PRACTITIONER
Payer: MEDICAID

## 2024-06-22 DIAGNOSIS — E78.00 HYPERCHOLESTEREMIA: ICD-10-CM

## 2024-06-22 LAB
ALBUMIN SERPL-MCNC: 4 G/DL (ref 3.5–5)
ALBUMIN/GLOB SERPL: 1.3 RATIO (ref 1.1–2)
ALP SERPL-CCNC: 86 UNIT/L (ref 40–150)
ALT SERPL-CCNC: 23 UNIT/L (ref 0–55)
ANION GAP SERPL CALC-SCNC: 7 MEQ/L
AST SERPL-CCNC: 24 UNIT/L (ref 5–34)
BILIRUB SERPL-MCNC: 0.5 MG/DL
BUN SERPL-MCNC: 19.8 MG/DL (ref 8.4–25.7)
CALCIUM SERPL-MCNC: 9.4 MG/DL (ref 8.4–10.2)
CHLORIDE SERPL-SCNC: 107 MMOL/L (ref 98–107)
CHOLEST SERPL-MCNC: 193 MG/DL
CHOLEST/HDLC SERPL: 4 {RATIO} (ref 0–5)
CO2 SERPL-SCNC: 25 MMOL/L (ref 22–29)
CREAT SERPL-MCNC: 0.94 MG/DL (ref 0.73–1.18)
CREAT/UREA NIT SERPL: 21
GFR SERPLBLD CREATININE-BSD FMLA CKD-EPI: >60 ML/MIN/1.73/M2
GLOBULIN SER-MCNC: 3.1 GM/DL (ref 2.4–3.5)
GLUCOSE SERPL-MCNC: 97 MG/DL (ref 74–100)
HDLC SERPL-MCNC: 49 MG/DL (ref 35–60)
LDLC SERPL CALC-MCNC: 121 MG/DL (ref 50–140)
POTASSIUM SERPL-SCNC: 4.4 MMOL/L (ref 3.5–5.1)
PROT SERPL-MCNC: 7.1 GM/DL (ref 6.4–8.3)
SODIUM SERPL-SCNC: 139 MMOL/L (ref 136–145)
TRIGL SERPL-MCNC: 115 MG/DL (ref 34–140)
VLDLC SERPL CALC-MCNC: 23 MG/DL

## 2024-06-22 PROCEDURE — 36415 COLL VENOUS BLD VENIPUNCTURE: CPT

## 2024-06-22 PROCEDURE — 80061 LIPID PANEL: CPT

## 2024-06-22 PROCEDURE — 80053 COMPREHEN METABOLIC PANEL: CPT

## 2024-06-24 ENCOUNTER — HOSPITAL ENCOUNTER (EMERGENCY)
Facility: HOSPITAL | Age: 59
Discharge: HOME | End: 2024-06-24
Attending: FAMILY MEDICINE
Payer: MEDICAID

## 2024-06-24 VITALS
SYSTOLIC BLOOD PRESSURE: 127 MMHG | HEART RATE: 72 BPM | BODY MASS INDEX: 26.52 KG/M2 | HEIGHT: 66 IN | RESPIRATION RATE: 18 BRPM | TEMPERATURE: 98 F | WEIGHT: 165 LBS | OXYGEN SATURATION: 99 % | DIASTOLIC BLOOD PRESSURE: 78 MMHG

## 2024-06-24 DIAGNOSIS — M54.41 ACUTE RIGHT-SIDED LOW BACK PAIN WITH RIGHT-SIDED SCIATICA: Primary | ICD-10-CM

## 2024-06-24 LAB
ANION GAP SERPL CALC-SCNC: 7 MEQ/L
BACTERIA #/AREA URNS AUTO: NORMAL /HPF
BASOPHILS # BLD AUTO: 0.04 X10(3)/MCL
BASOPHILS NFR BLD AUTO: 0.3 %
BILIRUB UR QL STRIP.AUTO: NEGATIVE
BUN SERPL-MCNC: 16.2 MG/DL (ref 8.4–25.7)
CALCIUM SERPL-MCNC: 9.3 MG/DL (ref 8.4–10.2)
CHLORIDE SERPL-SCNC: 108 MMOL/L (ref 98–107)
CLARITY UR: CLEAR
CO2 SERPL-SCNC: 22 MMOL/L (ref 22–29)
COLOR UR AUTO: YELLOW
CREAT SERPL-MCNC: 0.87 MG/DL (ref 0.73–1.18)
CREAT/UREA NIT SERPL: 19
EOSINOPHIL # BLD AUTO: 0.1 X10(3)/MCL (ref 0–0.9)
EOSINOPHIL NFR BLD AUTO: 0.8 %
ERYTHROCYTE [DISTWIDTH] IN BLOOD BY AUTOMATED COUNT: 12.9 % (ref 11.5–17)
GFR SERPLBLD CREATININE-BSD FMLA CKD-EPI: >60 ML/MIN/1.73/M2
GLUCOSE SERPL-MCNC: 84 MG/DL (ref 74–100)
GLUCOSE UR QL STRIP: NEGATIVE
HCT VFR BLD AUTO: 47.7 % (ref 42–52)
HGB BLD-MCNC: 15.6 G/DL (ref 14–18)
HGB UR QL STRIP: NEGATIVE
IMM GRANULOCYTES # BLD AUTO: 0.02 X10(3)/MCL (ref 0–0.04)
IMM GRANULOCYTES NFR BLD AUTO: 0.2 %
KETONES UR QL STRIP: NEGATIVE
LEUKOCYTE ESTERASE UR QL STRIP: NEGATIVE
LYMPHOCYTES # BLD AUTO: 3.44 X10(3)/MCL (ref 0.6–4.6)
LYMPHOCYTES NFR BLD AUTO: 29.1 %
MCH RBC QN AUTO: 29.8 PG (ref 27–31)
MCHC RBC AUTO-ENTMCNC: 32.7 G/DL (ref 33–36)
MCV RBC AUTO: 91 FL (ref 80–94)
MONOCYTES # BLD AUTO: 0.81 X10(3)/MCL (ref 0.1–1.3)
MONOCYTES NFR BLD AUTO: 6.9 %
NEUTROPHILS # BLD AUTO: 7.4 X10(3)/MCL (ref 2.1–9.2)
NEUTROPHILS NFR BLD AUTO: 62.7 %
NITRITE UR QL STRIP: NEGATIVE
PH UR STRIP: 6 [PH]
PLATELET # BLD AUTO: 398 X10(3)/MCL (ref 130–400)
PMV BLD AUTO: 10 FL (ref 7.4–10.4)
POTASSIUM SERPL-SCNC: 4.2 MMOL/L (ref 3.5–5.1)
PROT UR QL STRIP: NEGATIVE
RBC # BLD AUTO: 5.24 X10(6)/MCL (ref 4.7–6.1)
RBC #/AREA URNS AUTO: NORMAL /HPF
SODIUM SERPL-SCNC: 137 MMOL/L (ref 136–145)
SP GR UR STRIP.AUTO: 1.02 (ref 1–1.03)
SQUAMOUS #/AREA URNS AUTO: NORMAL /HPF
UROBILINOGEN UR STRIP-ACNC: 0.2
WBC # BLD AUTO: 11.81 X10(3)/MCL (ref 4.5–11.5)
WBC #/AREA URNS AUTO: NORMAL /HPF

## 2024-06-24 PROCEDURE — 96374 THER/PROPH/DIAG INJ IV PUSH: CPT

## 2024-06-24 PROCEDURE — 63600175 PHARM REV CODE 636 W HCPCS: Performed by: FAMILY MEDICINE

## 2024-06-24 PROCEDURE — 80048 BASIC METABOLIC PNL TOTAL CA: CPT | Performed by: FAMILY MEDICINE

## 2024-06-24 PROCEDURE — 85025 COMPLETE CBC W/AUTO DIFF WBC: CPT | Performed by: FAMILY MEDICINE

## 2024-06-24 PROCEDURE — 81003 URINALYSIS AUTO W/O SCOPE: CPT | Performed by: FAMILY MEDICINE

## 2024-06-24 PROCEDURE — 99285 EMERGENCY DEPT VISIT HI MDM: CPT | Mod: 25

## 2024-06-24 RX ORDER — KETOROLAC TROMETHAMINE 30 MG/ML
30 INJECTION, SOLUTION INTRAMUSCULAR; INTRAVENOUS
Status: COMPLETED | OUTPATIENT
Start: 2024-06-24 | End: 2024-06-24

## 2024-06-24 RX ORDER — CHLORZOXAZONE 500 MG/1
500 TABLET ORAL 4 TIMES DAILY PRN
Qty: 20 TABLET | Refills: 0 | Status: SHIPPED | OUTPATIENT
Start: 2024-06-24 | End: 2024-06-29

## 2024-06-24 RX ORDER — KETOROLAC TROMETHAMINE 10 MG/1
10 TABLET, FILM COATED ORAL EVERY 6 HOURS
Qty: 15 TABLET | Refills: 0 | Status: SHIPPED | OUTPATIENT
Start: 2024-06-24 | End: 2024-06-29

## 2024-06-24 RX ADMIN — KETOROLAC TROMETHAMINE 30 MG: 30 INJECTION, SOLUTION INTRAMUSCULAR at 09:06

## 2024-06-24 NOTE — ED PROVIDER NOTES
Encounter Date: 6/24/2024       History     Chief Complaint   Patient presents with    Flank Pain     Pt c/o R side flank pain that goes to his R lower back x 3 weeks; denies difficulty urinating - states he has a hx of kidney stones     59-year-old complains some right lower back pain right flank pain in his right hip history of kidney stones no fevers no chills does hurt with palpation and movements more musculoskeletal in origin patient is still concerned about stones physical exam is palpable spasms full range of motion no neuro deficits workup negative for stone appears to be more sciatica with lower back spasms discussed findings with the patient treatment plan he understands is in agreement        Review of patient's allergies indicates:  No Known Allergies  History reviewed. No pertinent past medical history.  History reviewed. No pertinent surgical history.  No family history on file.     Review of Systems   Musculoskeletal:  Positive for back pain.   All other systems reviewed and are negative.      Physical Exam     Initial Vitals [06/24/24 0913]   BP Pulse Resp Temp SpO2   127/78 72 18 98.2 °F (36.8 °C) 99 %      MAP       --         Physical Exam    Nursing note and vitals reviewed.  Constitutional: He appears well-developed and well-nourished. He is active.   HENT:   Head: Normocephalic and atraumatic.   Eyes: Conjunctivae, EOM and lids are normal. Pupils are equal, round, and reactive to light.   Neck: Trachea normal and phonation normal. Neck supple. No thyroid mass present.   Normal range of motion.  Cardiovascular:  Normal rate, regular rhythm, normal heart sounds and normal pulses.           Pulmonary/Chest: Breath sounds normal.   Abdominal: Abdomen is soft. Bowel sounds are normal.   Musculoskeletal:         General: Tenderness present.      Cervical back: Normal range of motion and neck supple.     Neurological: He is alert and oriented to person, place, and time. He has normal strength and  normal reflexes.   Skin: Skin is warm and intact.   Psychiatric: He has a normal mood and affect. His speech is normal and behavior is normal. Judgment and thought content normal. Cognition and memory are normal.         ED Course   Procedures  Labs Reviewed   BASIC METABOLIC PANEL - Abnormal; Notable for the following components:       Result Value    Chloride 108 (*)     All other components within normal limits   CBC WITH DIFFERENTIAL - Abnormal; Notable for the following components:    WBC 11.81 (*)     MCHC 32.7 (*)     All other components within normal limits   URINALYSIS, REFLEX TO URINE CULTURE - Normal   URINALYSIS, MICROSCOPIC - Normal   CBC W/ AUTO DIFFERENTIAL    Narrative:     The following orders were created for panel order CBC Auto Differential.  Procedure                               Abnormality         Status                     ---------                               -----------         ------                     CBC with Differential[[id number]]       Abnormal            Final result                 Please view results for these tests on the individual orders.          Imaging Results              CT Abdomen Pelvis  Without Contrast (Final result)  Result time 06/24/24 10:06:11      Final result by Kavya Hoffman MD (06/24/24 10:06:11)                   Impression:      No abnormality seen      Electronically signed by: Rudy Hoffman  Date:    06/24/2024  Time:    10:06               Narrative:    EXAMINATION:  CT ABDOMEN PELVIS WITHOUT CONTRAST    CLINICAL HISTORY:  Flank pain, kidney stone suspected;    TECHNIQUE:  Low dose axial images, sagittal and coronal reformations were obtained from the lung bases to the pubic symphysis.  No contrast was administered.  Automatic exposure control is utilized to reduce patient radiation exposure.    COMPARISON:  None    FINDINGS:  The lung bases are clear.    The liver appears normal.  No obvious liver mass or lesion is seen.    Gallbladder  appears normal.  No gallstones are seen.    The pancreas appears normal.  No inflammatory changes are seen in the pancreatic region.    The spleen appears normal and adrenal glands appear normal.  No adrenal nodule is seen.    No nephrolithiasis is seen.  No hydronephrosis is seen.  No hydroureter is seen.  No ureteral stone is seen.    No colitis is seen.  No diverticulitis is seen.  No appendicitis is seen.  There is some atherosclerotic plaque in the abdominal aorta and iliac vessels.  There is renal artery calcifications seen on the right side.    No free air seen.  No free fluid is seen.  The urinary bladder appears normal.    Bones show no acute abnormality.                                       Medications   ketorolac injection 30 mg (30 mg Intravenous Given 6/24/24 0942)     Medical Decision Making  59-year-old complains some right lower back pain right flank pain in his right hip history of kidney stones no fevers no chills does hurt with palpation and movements more musculoskeletal in origin patient is still concerned about stones physical exam is palpable spasms full range of motion no neuro deficits workup negative for stone appears to be more sciatica with lower back spasms discussed findings with the patient treatment plan he understands is in agreement          Amount and/or Complexity of Data Reviewed  Labs: ordered. Decision-making details documented in ED Course.  Radiology: ordered and independent interpretation performed.    Risk  Prescription drug management.  Risk Details: Differential diagnosis kidney stone versus muscle strain               ED Course as of 06/24/24 1034   Mon Jun 24, 2024   1031 Sodium: 137 [BL]   1031 Potassium: 4.2 [BL]   1031 Chloride(!): 108 [BL]   1031 CO2: 22 [BL]   1031 Glucose: 84 [BL]   1031 BUN: 16.2 [BL]   1031 Creatinine: 0.87 [BL]   1031 WBC(!): 11.81 [BL]   1031 Hemoglobin: 15.6 [BL]   1031 Hematocrit: 47.7 [BL]   1031 Leukocyte Esterase, UA: Negative [BL]    1031 NITRITE UA: Negative [BL]   1031 Blood, UA: Negative [BL]      ED Course User Index  [BL] Ernesto Gaston MD                           Clinical Impression:  Final diagnoses:  [M54.41] Acute right-sided low back pain with right-sided sciatica (Primary)          ED Disposition Condition    Discharge Stable          ED Prescriptions       Medication Sig Dispense Start Date End Date Auth. Provider    ketorolac (TORADOL) 10 mg tablet Take 1 tablet (10 mg total) by mouth every 6 (six) hours. for 5 days 15 tablet 6/24/2024 6/29/2024 Ernesto Gaston MD    chlorzoxazone (PARAFON FORTE) 500 mg Tab Take 1 tablet (500 mg total) by mouth 4 (four) times daily as needed. 20 tablet 6/24/2024 6/29/2024 Ernesto Gaston MD          Follow-up Information    None          Ernesto Gaston MD  06/24/24 1034

## 2024-06-26 ENCOUNTER — OFFICE VISIT (OUTPATIENT)
Dept: FAMILY MEDICINE | Facility: CLINIC | Age: 59
End: 2024-06-26
Payer: MEDICAID

## 2024-06-26 VITALS
OXYGEN SATURATION: 97 % | SYSTOLIC BLOOD PRESSURE: 116 MMHG | BODY MASS INDEX: 23.38 KG/M2 | DIASTOLIC BLOOD PRESSURE: 68 MMHG | HEART RATE: 82 BPM | HEIGHT: 66 IN | WEIGHT: 145.5 LBS | TEMPERATURE: 98 F

## 2024-06-26 DIAGNOSIS — I10 HYPERTENSION, UNSPECIFIED TYPE: ICD-10-CM

## 2024-06-26 DIAGNOSIS — Z20.2 ENCOUNTER FOR ASSESSMENT OF STD EXPOSURE: Primary | ICD-10-CM

## 2024-06-26 DIAGNOSIS — M54.31 SCIATICA OF RIGHT SIDE: ICD-10-CM

## 2024-06-26 PROCEDURE — 99214 OFFICE O/P EST MOD 30 MIN: CPT | Mod: ,,, | Performed by: NURSE PRACTITIONER

## 2024-06-26 PROCEDURE — 1160F RVW MEDS BY RX/DR IN RCRD: CPT | Mod: CPTII,,, | Performed by: NURSE PRACTITIONER

## 2024-06-26 PROCEDURE — 3008F BODY MASS INDEX DOCD: CPT | Mod: CPTII,,, | Performed by: NURSE PRACTITIONER

## 2024-06-26 PROCEDURE — 3074F SYST BP LT 130 MM HG: CPT | Mod: CPTII,,, | Performed by: NURSE PRACTITIONER

## 2024-06-26 PROCEDURE — 3078F DIAST BP <80 MM HG: CPT | Mod: CPTII,,, | Performed by: NURSE PRACTITIONER

## 2024-06-26 PROCEDURE — 1159F MED LIST DOCD IN RCRD: CPT | Mod: CPTII,,, | Performed by: NURSE PRACTITIONER

## 2024-06-26 RX ORDER — HYDROCODONE BITARTRATE AND ACETAMINOPHEN 5; 325 MG/1; MG/1
1 TABLET ORAL EVERY 6 HOURS PRN
Qty: 20 TABLET | Refills: 0 | Status: SHIPPED | OUTPATIENT
Start: 2024-06-26 | End: 2024-07-01

## 2024-06-26 RX ORDER — KETOROLAC TROMETHAMINE 10 MG/1
10 TABLET, FILM COATED ORAL 3 TIMES DAILY
COMMUNITY
Start: 2024-06-24 | End: 2024-06-26

## 2024-06-26 RX ORDER — METOPROLOL TARTRATE 25 MG/1
12.5 TABLET, FILM COATED ORAL DAILY
Qty: 30 TABLET | Refills: 0 | Status: SHIPPED | OUTPATIENT
Start: 2024-06-26

## 2024-06-26 RX ORDER — DICLOFENAC SODIUM 10 MG/G
2 GEL TOPICAL 4 TIMES DAILY
Qty: 200 G | Refills: 1 | Status: SHIPPED | OUTPATIENT
Start: 2024-06-26

## 2024-06-26 NOTE — PATIENT INSTRUCTIONS
Jin Harman,     If you are due for any health screening(s) below please notify me so we can arrange them to be ordered and scheduled. Most healthy patients at your age complete them, but you are free to accept or refuse.     If you can't do it, I'll definitely understand. If you can, I'd certainly appreciate it!    Tests to Keep You Healthy    Colon Cancer Screening: ORDERED  Last Blood Pressure <= 139/89 (6/26/2024): Yes      Its time for your colon cancer screening     Colorectal cancer is one of the leading causes of cancer death for men and women but it doesnt have to be. Screenings can prevent colorectal cancer or find it early enough to treat and cure the disease.     Our records indicate that you may be overdue for colon cancer screening. A colonoscopy or stool screening test can help identify patients at risk for developing colon cancer. Cancer screenings save lives, so schedule yours today to stay healthy.     A colonoscopy is the preferred test for detecting colon cancer. It is needed only once every 10 years if results are negative. While you are sedated, a flexible, lighted tube with a tiny camera is inserted into the rectum and advanced through the colon to look for cancers.     An alternative screening test that is used at home and returned to the lab may also be used. It detects hidden blood in bowel movements which could indicate cancer in the colon. If results are positive, you will need a colonoscopy to determine if the blood is a sign of cancer. This type of follow up (diagnostic) colonoscopy usually requires additional copays as required by your insurance provider.     If you recently had your colon cancer screening performed outside of [facility], please let your Health care team know so that they can update your health record. Please contact your PCP if you have any questions.    Were here to help you quit smoking     Our records indicated that you are still smoking. One of the best  things you can do for your health is to stop smoking and we are here to help.     Talk with your provider about our Smoking Cessation Program and how we can support you on your journey.

## 2024-06-28 ENCOUNTER — HOSPITAL ENCOUNTER (EMERGENCY)
Facility: HOSPITAL | Age: 59
Discharge: HOME | End: 2024-06-28
Attending: STUDENT IN AN ORGANIZED HEALTH CARE EDUCATION/TRAINING PROGRAM
Payer: MEDICAID

## 2024-06-28 VITALS
WEIGHT: 150 LBS | SYSTOLIC BLOOD PRESSURE: 136 MMHG | RESPIRATION RATE: 20 BRPM | HEART RATE: 71 BPM | OXYGEN SATURATION: 99 % | BODY MASS INDEX: 24.21 KG/M2 | TEMPERATURE: 98 F | DIASTOLIC BLOOD PRESSURE: 77 MMHG

## 2024-06-28 DIAGNOSIS — M54.31 RIGHT SIDED SCIATICA: Primary | ICD-10-CM

## 2024-06-28 PROCEDURE — 99284 EMERGENCY DEPT VISIT MOD MDM: CPT | Mod: 25

## 2024-06-28 PROCEDURE — 96372 THER/PROPH/DIAG INJ SC/IM: CPT | Performed by: STUDENT IN AN ORGANIZED HEALTH CARE EDUCATION/TRAINING PROGRAM

## 2024-06-28 PROCEDURE — 63600175 PHARM REV CODE 636 W HCPCS: Performed by: STUDENT IN AN ORGANIZED HEALTH CARE EDUCATION/TRAINING PROGRAM

## 2024-06-28 RX ORDER — DEXAMETHASONE SODIUM PHOSPHATE 4 MG/ML
8 INJECTION, SOLUTION INTRA-ARTICULAR; INTRALESIONAL; INTRAMUSCULAR; INTRAVENOUS; SOFT TISSUE
Status: COMPLETED | OUTPATIENT
Start: 2024-06-28 | End: 2024-06-28

## 2024-06-28 RX ADMIN — DEXAMETHASONE SODIUM PHOSPHATE 8 MG: 4 INJECTION, SOLUTION INTRA-ARTICULAR; INTRALESIONAL; INTRAMUSCULAR; INTRAVENOUS; SOFT TISSUE at 05:06

## 2024-06-28 NOTE — ED PROVIDER NOTES
Encounter Date: 6/28/2024       History     Chief Complaint   Patient presents with    rt lower back pain     Here 3 days ago but can't sleep tonight with same pain     Patient is a 59-year-old white gentleman with a history of coronary artery disease who presented to the ER today due to right-sided lower back pain.  He states it radiates down the posterior aspect of the right leg in his already been diagnosed with sciatica.  He states he does well typically with a steroid shot however he has been taking Toradol without much relief.  He denies any urinary incontinence, urinary retention, fecal incontinence, saddle anesthesia.  It looks like he was seen in the ER for this 2 days ago and had a CT scan done it was abdomen in his back which showed no acute findings.  He denies any other complaints or any other injuries.      Review of patient's allergies indicates:  No Known Allergies  No past medical history on file.  No past surgical history on file.  No family history on file.     Review of Systems   Constitutional:  Negative for chills, fatigue and fever.   HENT:  Negative for congestion, sore throat and trouble swallowing.    Eyes:  Negative for pain and visual disturbance.   Respiratory:  Negative for cough, shortness of breath and wheezing.    Cardiovascular:  Negative for chest pain and palpitations.   Gastrointestinal:  Negative for abdominal pain, blood in stool, constipation, diarrhea, nausea and vomiting.   Genitourinary:  Negative for dysuria and hematuria.   Musculoskeletal:  Positive for back pain. Negative for myalgias.   Skin:  Negative for rash and wound.   Neurological:  Negative for seizures, syncope and headaches.   Psychiatric/Behavioral:  Negative for confusion. The patient is not nervous/anxious.        Physical Exam     Initial Vitals [06/28/24 0444]   BP Pulse Resp Temp SpO2   136/77 71 20 97.8 °F (36.6 °C) 99 %      MAP       --         Physical Exam    Nursing note and vitals  reviewed.  Constitutional: He appears well-developed and well-nourished. No distress.   HENT:   Head: Normocephalic and atraumatic.   Eyes: Conjunctivae and EOM are normal. Right eye exhibits no discharge. Left eye exhibits no discharge. No scleral icterus.   Neck: No tracheal deviation present.   Normal range of motion.  Cardiovascular:  Normal rate, regular rhythm and normal heart sounds.     Exam reveals no gallop and no friction rub.       No murmur heard.  Pulmonary/Chest: Breath sounds normal. No respiratory distress. He has no wheezes. He has no rhonchi. He has no rales.   Musculoskeletal:         General: No edema. Normal range of motion.      Cervical back: Normal range of motion.      Comments: There is no C, T, L-spine tenderness no step-off lesions.  Reproducible pain with palpation of the right superior gluteal region.     Neurological: He is alert.   Skin: Skin is warm and dry. No rash and no abscess noted. No erythema. No pallor.   Psychiatric: His behavior is normal. Judgment normal.         ED Course   Procedures  Labs Reviewed - No data to display       Imaging Results    None          Medications   dexAMETHasone injection 8 mg (has no administration in time range)     Medical Decision Making  Differentials: Sciatica, cauda equina syndrome, vertebral injury, lumbar strain   History in his the patient   59-year-old well-appearing gentleman with stable vital signs presents with chronic sciatica.  Denies all red flag symptoms of cauda equina syndrome and denies any injury that would have led to a vertebral injury.  Patient states he responded well to steroids in the past and thus 8 mg of Decadron was given here intramuscularly.  Rice therapy advised.  All questions answered in layman's terms and return precautions were discussed.    Amount and/or Complexity of Data Reviewed  External Data Reviewed: labs and radiology.    Risk  Prescription drug management.                                      Clinical  Impression:  Final diagnoses:  [M54.31] Right sided sciatica (Primary)          ED Disposition Condition    Discharge Stable          ED Prescriptions    None       Follow-up Information       Follow up With Specialties Details Why Contact Info    [facility] - Emergency Dept Emergency Medicine  If symptoms worsen [address] [facility] [address]  [phone]             Angelo Bush MD  06/28/24 0292

## 2024-07-01 ENCOUNTER — TELEPHONE (OUTPATIENT)
Dept: FAMILY MEDICINE | Facility: CLINIC | Age: 59
End: 2024-07-01
Payer: MEDICAID

## 2024-07-01 NOTE — TELEPHONE ENCOUNTER
Patient calling in for refill of norco.  Advised that provider out of the office till next Monday and if he is in pain to report to UC or ER.  Agrees and thanked us for calling.

## 2024-07-01 NOTE — TELEPHONE ENCOUNTER
----- Message from Tiffany Soria sent at 7/1/2024  8:18 AM CDT -----  Regarding: refill  Who Called: Kimani Redd    Refill or New Rx:Refill  RX Name and Strength:HYDROcodone-acetaminophen (NORCO) 5-325 mg per tablet  How is the patient currently taking it? (ex. 1XDay):  Is this a 30 day or 90 day RX:  Local or Mail Order:local  List of preferred pharmacies on file (remove unneeded): [unfilled]  If different Pharmacy is requested, enter Pharmacy information here including location and phone number: [id number] AT [address] AT [address]      Ordering Provider:      Preferred Method of Contact: Phone Call  Patient's Preferred Phone Number on File: [phone]   Best Call Back Number, if different:  Additional Information: pt states his pain has gotten worse with the sciatic nerve

## 2024-07-08 ENCOUNTER — TELEPHONE (OUTPATIENT)
Dept: FAMILY MEDICINE | Facility: CLINIC | Age: 59
End: 2024-07-08
Payer: MEDICAID

## 2024-07-08 DIAGNOSIS — M54.31 SCIATICA OF RIGHT SIDE: Primary | ICD-10-CM

## 2024-07-08 RX ORDER — HYDROCODONE BITARTRATE AND ACETAMINOPHEN 7.5; 325 MG/1; MG/1
1 TABLET ORAL EVERY 6 HOURS PRN
Qty: 20 TABLET | Refills: 0 | Status: SHIPPED | OUTPATIENT
Start: 2024-07-08 | End: 2024-07-13

## 2024-07-08 NOTE — TELEPHONE ENCOUNTER
----- Message from KAYA Rodriguez sent at 7/8/2024  5:08 PM CDT -----  Regarding: RE: refill request  Spoke with patient. Rx sent to pharmacy.  He will follow-up if symptoms do not improve upon completion for referral to PT.  ----- Message -----  From: Domingo Blanco LPN  Sent: 7/8/2024   3:37 PM CDT  To: KAYA Rodriguez  Subject: FW: refill request                               Pt requesting the medication below for nerve pain please advise.  ----- Message -----  From: Kenia Youssef  Sent: 7/8/2024   1:57 PM CDT  To: Autumn Bowen Staff  Subject: refill request                                   Who Called: Kimani Redd    Refill or New Rx:Refill  RX Name and Strength:HYDROcodone-acetaminophen (NORCO) 5-325 mg per tablet  How is the patient currently taking it? (ex. 1XDay):  Is this a 30 day or 90 day RX:  Local or Mail Order:local  List of preferred pharmacies on file (remove unneeded): [facility] #[id number] at [address]  Phone: [phone]  Fax: [phone]      If different Pharmacy is requested, enter Pharmacy information here including location and phone number:    Ordering Provider:      Preferred Method of Contact: Phone Call  Patient's Preferred Phone Number on File: [phone]   Best Call Back Number, if different:  Additional Information: Pt is requesting a refill on medication, pt said he cannot get medication he will take Tramadol.

## 2024-07-15 ENCOUNTER — TELEPHONE (OUTPATIENT)
Dept: FAMILY MEDICINE | Facility: CLINIC | Age: 59
End: 2024-07-15
Payer: MEDICAID

## 2024-07-15 NOTE — TELEPHONE ENCOUNTER
Pt made aware.----- Message from KAYA Rodriguez sent at 7/15/2024  1:29 PM CDT -----  Unable to refill. NSAIDs and Tylenol Arthritis OTC as directed.  ----- Message -----  From: Domingo Blanco LPN  Sent: 7/15/2024   9:49 AM CDT  To: KAYA Rodriguez    See below.  ----- Message -----  From: Birgit Caldera  Sent: 7/15/2024   9:36 AM CDT  To: Autumn Bowen Staff    .Who Called: Kimani Redd    Refill or New Rx:Refill  RX Name and Strength:HYDROcodone-acetaminophen (NORCO) 5-325 mg per tablet  How is the patient currently taking it? (ex. 1XDay):n/a  Is this a 30 day or 90 day RX:n/a  Local or Mail Order:Local  List of preferred pharmacies on file (remove unneeded): [unfilled]  If different Pharmacy is requested, enter Pharmacy information here including location and phone number: [facility] #[id number] at [address]  Phone:[phone]  Fax:[phone]     Ordering Provider:??      Preferred Method of Contact: Phone Call  Patient's Preferred Phone Number on File: [phone]   Best Call Back Number, if different:  Additional Information: n/a

## 2024-07-15 NOTE — PROGRESS NOTES
"   Patient ID: [id number]     Chief Complaint: Follow-up (Has cologuard not done yet ,)      HPI:     Kimani Redd is a 59 y.o. male here today for a follow up.  Patient also presents with complaints of right leg sciatica x1 week.  Patient reports that he is unable to work due to pain.  Patient rates pain 8/10.  Pain is worse when standing and walking, relieved when laying and at rest.  Patient reports pain begins in right buttocks and radiates down right lower extremity.  Patient also requesting STD testing.  Denies any symptoms or known exposures.    Past Medical History:  has a past medical history of Kidney stone.    Surgical History:  has a past surgical history that includes Carotid stent.    Family History: family history is not on file.    Social History:  reports that he has been smoking cigarettes. He has never used smokeless tobacco. He reports that he does not drink alcohol and does not use drugs.    Current Outpatient Medications   Medication Instructions    aspirin (ECOTRIN) 81 mg, Oral, Daily    diclofenac sodium (VOLTAREN) 2 g, Topical (Top), 4 times daily    fenofibrate 160 mg, Oral, Daily    meloxicam (MOBIC) 15 mg, Oral, Daily    metoprolol tartrate (LOPRESSOR) 12.5 mg, Oral, Daily    simvastatin (ZOCOR) 40 mg, Oral, Nightly       Patient has No Known Allergies.     Patient Care Team:  Jessi Leyva FNP as PCP - General (Nurse Practitioner)       Subjective:     Review of Systems    12 point review of systems conducted, negative except as stated in the history of present illness. See HPI for details.      Objective:     Visit Vitals  /68   Pulse 82   Temp 97.8 °F (36.6 °C)   Ht 5' 6" (1.676 m)   Wt 66 kg (145 lb 8 oz)   SpO2 97%   BMI 23.48 kg/m²       Physical Exam    General: Alert and oriented, No acute distress.  Head: Normocephalic, Atraumatic.  Eye: Pupils are equal, round and reactive to light, Extraocular movements are intact, Sclera non-icteric.  Ears/Nose/Throat: Normal, " "Mucosa moist,Clear.  Neck/Thyroid: Supple, Non-tender, No carotid bruit, No lymphadenopathy, No JVD, Full range of motion.  Respiratory: Clear to auscultation bilaterally; No wheezes, rales or rhonchi,Non-labored respirations, Symmetrical chest wall expansion.  Cardiovascular: Regular rate and rhythm, S1/S2 normal, No murmurs, rubs or gallops.  Gastrointestinal: Soft, Non-tender, Non-distended, Normal bowel sounds, No palpable organomegaly.  Musculoskeletal:  Decreased range of motion to right lower extremity, unsteady gait.  Integumentary: Warm, Dry, Intact, No suspicious lesions or rashes.  Extremities: No clubbing, cyanosis or edema  Neurologic: No focal deficits, Cranial Nerves II-XII are grossly intact, Motor strength normal upper and lower extremities, Sensory exam intact.  Psychiatric: Normal interaction, Coherent speech, Euthymic mood, Appropriate affect     Labs Reviewed:     Chemistry:  Lab Results   Component Value Date     06/22/2024    K 4.4 06/22/2024    BUN 19.8 06/22/2024    CREATININE 0.94 06/22/2024    EGFRNORACEVR >60 06/22/2024    GLUCOSE 97 06/22/2024    CALCIUM 9.4 06/22/2024    ALKPHOS 86 06/22/2024    LABPROT 7.1 06/22/2024    ALBUMIN 4.0 06/22/2024    AST 24 06/22/2024    ALT 23 06/22/2024    [id number] 22.0 (L) 09/02/2023    TSH 0.851 09/02/2023    PSA 0.83 09/02/2023        Lab Results   Component Value Date    HGBA1C 5.0 09/02/2023        Hematology:  Lab Results   Component Value Date    WBC 10.25 02/29/2024    HGB 16.7 02/29/2024    HCT 52.1 (H) 02/29/2024     (H) 02/29/2024       Lipid Panel:  Lab Results   Component Value Date    CHOL 193 06/22/2024    HDL 49 06/22/2024    .00 06/22/2024    TRIG 115 06/22/2024    TOTALCHOLEST 4 06/22/2024        Urine:  No results found for: "COLORUA", "APPEARANCEUA", "SGUA", "PHUA", "PROTEINUA", "GLUCOSEUA", "KETONESUA", "BLOODUA", "NITRITESUA", "LEUKOCYTESUR", "RBCUA", "WBCUA", "BACTERIA", "SQEPUA", "HYALINECASTS", " ""CREATRANDUR", "PROTEINURINE", "UPROTCREA"       Assessment:       ICD-10-CM ICD-9-CM   1. Encounter for assessment of STD exposure  Z20.2 V01.6   2. Hypertension, unspecified type  I10 401.9   3. Sciatica of right side  M54.31 724.3        Plan:   1. Hypertension, unspecified type  Well controlled.   Continue   Hypertension Medications               metoprolol tartrate (LOPRESSOR) 25 MG tablet Take 0.5 tablets (12.5 mg total) by mouth once daily.        Low Sodium Diet (DASH Diet - Less than 2 grams of sodium per day).  Monitor blood pressure daily and log. Report consistent numbers greater than 140/90.  Maintain healthy weight with goal BMI <30. Exercise 30 minutes per day, 5 days per week.  Smoking cessation encouraged to aid in BP reduction.    - metoprolol tartrate (LOPRESSOR) 25 MG tablet; Take 0.5 tablets (12.5 mg total) by mouth once daily.  Dispense: 30 tablet; Refill: 0    2. Sciatica of right side  Not at goal.  - HYDROcodone-acetaminophen (NORCO) 5-325 mg per tablet; Take 1 tablet by mouth every 6 (six) hours as needed for Pain.  Dispense: 20 tablet; Refill: 0  - diclofenac sodium (VOLTAREN) 1 % Gel; Apply 2 g topically 4 (four) times daily.  Dispense: 200 g; Refill: 1    3. Encounter for assessment of STD exposure  - HIV 1/2 Ag/Ab (4th Gen); Future  - Chlamydia/GC, PCR  - Trichomonas vaginalis Amplified RNA  - Hepatitis Panel, Acute; Future         Follow up in about 3 months (around 9/26/2024). In addition to their scheduled follow up, the patient has also been instructed to follow up on as needed basis.     Future Appointments   Date Time Provider Department Center   9/10/2024  9:30 AM Jessi eLyva FNP [facility]   9/26/2024 11:15 AM Jessi Leyva FNP [facility]        KAYA Rodriguez    "

## 2024-07-22 DIAGNOSIS — N52.9 ERECTILE DYSFUNCTION, UNSPECIFIED ERECTILE DYSFUNCTION TYPE: Primary | ICD-10-CM

## 2024-07-22 RX ORDER — TADALAFIL 5 MG/1
5 TABLET ORAL DAILY PRN
Qty: 15 TABLET | Refills: 1 | Status: SHIPPED | OUTPATIENT
Start: 2024-07-22 | End: 2025-07-22

## 2024-07-30 ENCOUNTER — TELEPHONE (OUTPATIENT)
Dept: FAMILY MEDICINE | Facility: CLINIC | Age: 59
End: 2024-07-30
Payer: MEDICAID

## 2024-07-30 NOTE — TELEPHONE ENCOUNTER
----- Message from Harini West sent at 7/30/2024 11:53 AM CDT -----  Who Called: Kimani Redd    Refill or New Rx:Refill  RX Name and Strength:Viagara  How is the patient currently taking it? (ex. 1XDay):  Is this a 30 day or 90 day RX:  Local or Mail Order:  List of preferred pharmacies on file (remove unneeded):[facility] [address] [place] [address]  If different Pharmacy is requested, enter Pharmacy information here including location and phone number:    Ordering Provider:    Preferred Method of Contact: Phone Call  Patient's Preferred Phone Number on File: [phone]   Best Call Back Number, if different:  Additional Information: Patient  stated he was prescribed Cialis but doesn't work. Please advise

## 2024-07-31 ENCOUNTER — TELEPHONE (OUTPATIENT)
Dept: FAMILY MEDICINE | Facility: CLINIC | Age: 59
End: 2024-07-31

## 2024-07-31 NOTE — TELEPHONE ENCOUNTER
----- Message from Della Luci sent at 7/30/2024  4:39 PM CDT -----  .Who Called: Kimani Redd    Patient is returning phone call    Who Left Message for Patient:unk  Does the patient know what this is regarding?:unk      Preferred Method of Contact: Phone Call  Patient's Preferred Phone Number on File: [phone]   Best Call Back Number, if different:  Additional Information: pt miss call from clinic

## 2024-08-15 ENCOUNTER — TELEPHONE (OUTPATIENT)
Dept: FAMILY MEDICINE | Facility: CLINIC | Age: 59
End: 2024-08-15
Payer: MEDICAID

## 2024-08-15 DIAGNOSIS — E78.00 ELEVATED CHOLESTEROL: ICD-10-CM

## 2024-08-15 DIAGNOSIS — Z00.00 WELLNESS EXAMINATION: Primary | ICD-10-CM

## 2024-08-15 RX ORDER — FENOFIBRATE 160 MG/1
160 TABLET ORAL DAILY
Qty: 90 TABLET | Refills: 3 | Status: SHIPPED | OUTPATIENT
Start: 2024-08-15 | End: 2024-08-15 | Stop reason: CLARIF

## 2024-08-15 RX ORDER — FENOFIBRATE 160 MG/1
160 TABLET ORAL DAILY
Qty: 90 TABLET | Refills: 3 | Status: SHIPPED | OUTPATIENT
Start: 2024-08-15

## 2024-08-15 NOTE — TELEPHONE ENCOUNTER
----- Message from Nara Crouch sent at 8/15/2024 12:51 PM CDT -----  Regarding: lab/refill  .Caller is requesting to schedule their Lab appointment prior to annual appointment.    Name of Caller:pt    Preferred Date and Time of Labs:    Date of EPP Appointment:9/10    Where would they like the lab performed?n/a    Would the patient rather a call back or a response via [facility]? cb    Best Call Back Number:[phone]    Additional Information:     .Type:  RX Refill Request    Who Called: pt  Refill or New Rx:refill  RX Name and Strength:fenofibrate 160 MG Tab  How is the patient currently taking it? (ex. 1XDay):: TAKE ONE TABLET BY MOUTH EVERY DAY - Or  Is this a 30 day or 90 day RX:90  Preferred Pharmacy with phone number:[facility] #[id number] AT [address]  Local or Mail Order:l  Ordering Provider:  Would the patient rather a call back or a response via [facility]? no  Best Call Back Number:  Additional Information:

## 2024-08-20 DIAGNOSIS — I10 HYPERTENSION, UNSPECIFIED TYPE: ICD-10-CM

## 2024-08-20 RX ORDER — METOPROLOL TARTRATE 25 MG/1
12.5 TABLET, FILM COATED ORAL DAILY
Qty: 30 TABLET | Refills: 11 | Status: SHIPPED | OUTPATIENT
Start: 2024-08-20

## 2024-09-03 ENCOUNTER — TELEPHONE (OUTPATIENT)
Dept: FAMILY MEDICINE | Facility: CLINIC | Age: 59
End: 2024-09-03
Payer: MEDICAID

## 2024-09-07 ENCOUNTER — LAB VISIT (OUTPATIENT)
Dept: LAB | Facility: HOSPITAL | Age: 59
End: 2024-09-07
Attending: NURSE PRACTITIONER

## 2024-09-07 DIAGNOSIS — Z00.00 WELLNESS EXAMINATION: ICD-10-CM

## 2024-09-07 LAB
25(OH)D3+25(OH)D2 SERPL-MCNC: 26 NG/ML (ref 30–80)
ALBUMIN SERPL-MCNC: 3.7 G/DL (ref 3.5–5)
ALBUMIN/GLOB SERPL: 1.2 RATIO (ref 1.1–2)
ALP SERPL-CCNC: 125 UNIT/L (ref 40–150)
ALT SERPL-CCNC: 13 UNIT/L (ref 0–55)
ANION GAP SERPL CALC-SCNC: 8 MEQ/L
AST SERPL-CCNC: 13 UNIT/L (ref 5–34)
BASOPHILS # BLD AUTO: 0.04 X10(3)/MCL
BASOPHILS NFR BLD AUTO: 0.4 %
BILIRUB SERPL-MCNC: 0.4 MG/DL
BUN SERPL-MCNC: 10.8 MG/DL (ref 8.4–25.7)
CALCIUM SERPL-MCNC: 8.9 MG/DL (ref 8.4–10.2)
CHLORIDE SERPL-SCNC: 108 MMOL/L (ref 98–107)
CHOLEST SERPL-MCNC: 255 MG/DL
CHOLEST/HDLC SERPL: 6 {RATIO} (ref 0–5)
CO2 SERPL-SCNC: 23 MMOL/L (ref 22–29)
CREAT SERPL-MCNC: 0.75 MG/DL (ref 0.73–1.18)
CREAT UR-MCNC: 71.3 MG/DL (ref 63–166)
CREAT/UREA NIT SERPL: 14
EOSINOPHIL # BLD AUTO: 0.22 X10(3)/MCL (ref 0–0.9)
EOSINOPHIL NFR BLD AUTO: 2.2 %
ERYTHROCYTE [DISTWIDTH] IN BLOOD BY AUTOMATED COUNT: 12.7 % (ref 11.5–17)
EST. AVERAGE GLUCOSE BLD GHB EST-MCNC: 91.1 MG/DL
GFR SERPLBLD CREATININE-BSD FMLA CKD-EPI: >60 ML/MIN/1.73/M2
GLOBULIN SER-MCNC: 3 GM/DL (ref 2.4–3.5)
GLUCOSE SERPL-MCNC: 94 MG/DL (ref 74–100)
HBA1C MFR BLD: 4.8 %
HCT VFR BLD AUTO: 46.6 % (ref 42–52)
HDLC SERPL-MCNC: 40 MG/DL (ref 35–60)
HGB BLD-MCNC: 15.5 G/DL (ref 14–18)
IMM GRANULOCYTES # BLD AUTO: 0.03 X10(3)/MCL (ref 0–0.04)
IMM GRANULOCYTES NFR BLD AUTO: 0.3 %
LDLC SERPL CALC-MCNC: 187 MG/DL (ref 50–140)
LYMPHOCYTES # BLD AUTO: 2.82 X10(3)/MCL (ref 0.6–4.6)
LYMPHOCYTES NFR BLD AUTO: 28.2 %
MCH RBC QN AUTO: 30.8 PG (ref 27–31)
MCHC RBC AUTO-ENTMCNC: 33.3 G/DL (ref 33–36)
MCV RBC AUTO: 92.5 FL (ref 80–94)
MICROALBUMIN UR-MCNC: 5.5 UG/ML
MICROALBUMIN/CREAT RATIO PNL UR: 7.7 MG/GM CR (ref 0–30)
MONOCYTES # BLD AUTO: 0.91 X10(3)/MCL (ref 0.1–1.3)
MONOCYTES NFR BLD AUTO: 9.1 %
NEUTROPHILS # BLD AUTO: 5.97 X10(3)/MCL (ref 2.1–9.2)
NEUTROPHILS NFR BLD AUTO: 59.8 %
PLATELET # BLD AUTO: 349 X10(3)/MCL (ref 130–400)
PMV BLD AUTO: 10.3 FL (ref 7.4–10.4)
POTASSIUM SERPL-SCNC: 4.5 MMOL/L (ref 3.5–5.1)
PROT SERPL-MCNC: 6.7 GM/DL (ref 6.4–8.3)
PSA SERPL-MCNC: 0.84 NG/ML
RBC # BLD AUTO: 5.04 X10(6)/MCL (ref 4.7–6.1)
SODIUM SERPL-SCNC: 139 MMOL/L (ref 136–145)
TRIGL SERPL-MCNC: 138 MG/DL (ref 34–140)
TSH SERPL-ACNC: 1.17 UIU/ML (ref 0.35–4.94)
VLDLC SERPL CALC-MCNC: 28 MG/DL
WBC # BLD AUTO: 9.99 X10(3)/MCL (ref 4.5–11.5)

## 2024-09-07 PROCEDURE — 83036 HEMOGLOBIN GLYCOSYLATED A1C: CPT

## 2024-09-07 PROCEDURE — 80053 COMPREHEN METABOLIC PANEL: CPT

## 2024-09-07 PROCEDURE — 36415 COLL VENOUS BLD VENIPUNCTURE: CPT

## 2024-09-07 PROCEDURE — 82043 UR ALBUMIN QUANTITATIVE: CPT

## 2024-09-07 PROCEDURE — 84153 ASSAY OF PSA TOTAL: CPT

## 2024-09-07 PROCEDURE — 85025 COMPLETE CBC W/AUTO DIFF WBC: CPT

## 2024-09-07 PROCEDURE — 84443 ASSAY THYROID STIM HORMONE: CPT

## 2024-09-07 PROCEDURE — 82306 VITAMIN D 25 HYDROXY: CPT

## 2024-09-07 PROCEDURE — 80061 LIPID PANEL: CPT

## 2024-09-19 ENCOUNTER — TELEPHONE (OUTPATIENT)
Dept: FAMILY MEDICINE | Facility: CLINIC | Age: 59
End: 2024-09-19

## 2025-03-06 ENCOUNTER — HOSPITAL ENCOUNTER (EMERGENCY)
Facility: HOSPITAL | Age: 60
Discharge: HOME | End: 2025-03-06
Attending: STUDENT IN AN ORGANIZED HEALTH CARE EDUCATION/TRAINING PROGRAM

## 2025-03-06 VITALS
DIASTOLIC BLOOD PRESSURE: 89 MMHG | WEIGHT: 165 LBS | OXYGEN SATURATION: 98 % | TEMPERATURE: 98 F | SYSTOLIC BLOOD PRESSURE: 174 MMHG | BODY MASS INDEX: 27.49 KG/M2 | HEART RATE: 88 BPM | RESPIRATION RATE: 20 BRPM | HEIGHT: 65 IN

## 2025-03-06 DIAGNOSIS — M54.12 CERVICAL RADICULOPATHY: Primary | ICD-10-CM

## 2025-03-06 PROCEDURE — 25000003 PHARM REV CODE 250: Performed by: STUDENT IN AN ORGANIZED HEALTH CARE EDUCATION/TRAINING PROGRAM

## 2025-03-06 PROCEDURE — 63600175 PHARM REV CODE 636 W HCPCS: Performed by: STUDENT IN AN ORGANIZED HEALTH CARE EDUCATION/TRAINING PROGRAM

## 2025-03-06 PROCEDURE — 96372 THER/PROPH/DIAG INJ SC/IM: CPT | Performed by: STUDENT IN AN ORGANIZED HEALTH CARE EDUCATION/TRAINING PROGRAM

## 2025-03-06 PROCEDURE — 99284 EMERGENCY DEPT VISIT MOD MDM: CPT | Mod: 25

## 2025-03-06 RX ORDER — KETOROLAC TROMETHAMINE 30 MG/ML
30 INJECTION, SOLUTION INTRAMUSCULAR; INTRAVENOUS
Status: COMPLETED | OUTPATIENT
Start: 2025-03-06 | End: 2025-03-06

## 2025-03-06 RX ORDER — KETOROLAC TROMETHAMINE 10 MG/1
10 TABLET, FILM COATED ORAL EVERY 6 HOURS PRN
Qty: 15 TABLET | Refills: 0 | Status: SHIPPED | OUTPATIENT
Start: 2025-03-06 | End: 2025-03-11

## 2025-03-06 RX ORDER — LIDOCAINE 50 MG/G
1 PATCH TOPICAL
Status: DISCONTINUED | OUTPATIENT
Start: 2025-03-06 | End: 2025-03-06 | Stop reason: HOSPADM

## 2025-03-06 RX ORDER — DEXAMETHASONE SODIUM PHOSPHATE 4 MG/ML
8 INJECTION, SOLUTION INTRA-ARTICULAR; INTRALESIONAL; INTRAMUSCULAR; INTRAVENOUS; SOFT TISSUE
Status: COMPLETED | OUTPATIENT
Start: 2025-03-06 | End: 2025-03-06

## 2025-03-06 RX ORDER — LIDOCAINE 50 MG/G
1 PATCH TOPICAL DAILY
Qty: 30 PATCH | Refills: 0 | Status: ON HOLD | OUTPATIENT
Start: 2025-03-06

## 2025-03-06 RX ORDER — FENTANYL CITRATE 50 UG/ML
50 INJECTION, SOLUTION INTRAMUSCULAR; INTRAVENOUS
Refills: 0 | Status: COMPLETED | OUTPATIENT
Start: 2025-03-06 | End: 2025-03-06

## 2025-03-06 RX ORDER — CYCLOBENZAPRINE HCL 10 MG
10 TABLET ORAL
Status: COMPLETED | OUTPATIENT
Start: 2025-03-06 | End: 2025-03-06

## 2025-03-06 RX ORDER — CYCLOBENZAPRINE HCL 10 MG
10 TABLET ORAL 3 TIMES DAILY PRN
Qty: 15 TABLET | Refills: 0 | Status: SHIPPED | OUTPATIENT
Start: 2025-03-06 | End: 2025-03-11 | Stop reason: SDUPTHER

## 2025-03-06 RX ADMIN — DEXAMETHASONE SODIUM PHOSPHATE 8 MG: 4 INJECTION, SOLUTION INTRA-ARTICULAR; INTRALESIONAL; INTRAMUSCULAR; INTRAVENOUS; SOFT TISSUE at 09:03

## 2025-03-06 RX ADMIN — CYCLOBENZAPRINE 10 MG: 10 TABLET, FILM COATED ORAL at 08:03

## 2025-03-06 RX ADMIN — LIDOCAINE 1 PATCH: 50 PATCH TOPICAL at 08:03

## 2025-03-06 RX ADMIN — FENTANYL CITRATE 50 MCG: 50 INJECTION INTRAMUSCULAR; INTRAVENOUS at 09:03

## 2025-03-06 RX ADMIN — KETOROLAC TROMETHAMINE 30 MG: 30 INJECTION, SOLUTION INTRAMUSCULAR at 08:03

## 2025-03-06 NOTE — ED PROVIDER NOTES
Encounter Date: 3/6/2025       History     Chief Complaint   Patient presents with    left shoulder pain     States yesterday it started hurting  suddendly/ no injury/ hurts into his left side of neck/ increased pain with any movement     HPI  Patient is a 59-year-old male who presents to ER complaining of left-sided shoulder/neck pain that started yesterday.  Patient denies any falls will does endorses occasional heavy lifting.  Secondary symptoms presents to ER for further care and evaluation.  Patient is able to move of the left upper extremity however states when he does he gets a radiating pain from his left shoulder down his left arm.  Review of patient's allergies indicates:  No Known Allergies  Past Medical History:   Diagnosis Date    Kidney stone     passed 2 weeks ago     Past Surgical History:   Procedure Laterality Date    CAROTID STENT       No family history on file.  Social History[1]  Review of Systems   Constitutional:  Negative for fever.   HENT:  Negative for sore throat.    Eyes:  Negative for visual disturbance.   Respiratory:  Negative for shortness of breath.    Cardiovascular:  Negative for chest pain.   Gastrointestinal:  Negative for nausea.   Endocrine: Negative for polyuria.   Genitourinary:  Negative for dysuria.   Musculoskeletal:  Positive for arthralgias. Negative for back pain.   Skin:  Negative for rash.   Neurological:  Negative for weakness.   Hematological:  Does not bruise/bleed easily.   All other systems reviewed and are negative.      Physical Exam     Initial Vitals [03/06/25 0813]   BP Pulse Resp Temp SpO2   (!) 174/89 88 (!) 24 98 °F (36.7 °C) 98 %      MAP       --         Physical Exam    Nursing note and vitals reviewed.  Constitutional: Vital signs are normal. He appears well-developed and well-nourished. He is not diaphoretic. He is active.  Non-toxic appearance. He does not appear ill. No distress.   HENT:   Head: Normocephalic and atraumatic.   Eyes: Conjunctivae  are normal. Pupils are equal, round, and reactive to light. Right conjunctiva is not injected. Left conjunctiva is not injected.   Neck: Trachea normal. Neck supple.   Normal range of motion.   Full passive range of motion without pain.     Cardiovascular:  Normal rate, regular rhythm, S1 normal, S2 normal, intact distal pulses and normal pulses.           Pulmonary/Chest: Breath sounds normal. No respiratory distress.   Abdominal: Abdomen is soft. Bowel sounds are normal. There is no abdominal tenderness.   Musculoskeletal:         General: Tenderness present.      Cervical back: Full passive range of motion without pain, normal range of motion and neck supple. No rigidity.      Right lower leg: No swelling. No edema.      Left lower leg: No swelling. No edema.      Comments: Reproducible left cervical paraspinal pain on palpation, with reproducible pain to left upper extremity with movement.     Neurological: He is alert and oriented to person, place, and time.   Skin: Skin is warm and dry. Capillary refill takes less than 2 seconds.         ED Course   Procedures  Labs Reviewed - No data to display       Imaging Results              X-Ray Cervical Spine AP And Lateral (Final result)  Result time 03/06/25 09:26:27      Final result by Andrea Orellana MD (03/06/25 09:26:27)                   Impression:      Lower cervical degenerative changes.      Electronically signed by: Andrea Orellana  Date:    03/06/2025  Time:    09:26               Narrative:    EXAMINATION:  XR CERVICAL SPINE AP LATERAL    CLINICAL HISTORY:  Patient complaining of left shoulder pain;    TECHNIQUE:  Frontal and lateral radiographs of the cervical spine.    COMPARISON:  No relevant comparison studies available at the time of dictation.    FINDINGS:  Vertebral body heights are maintained.  The dens is partially obscured, but appears intact.  No significant listhesis.  Mid and lower cervical osteophytosis with mild disc space narrowing at  C5-6 and C6-7.  Prevertebral soft tissues within normal limits.                                       X-Ray Shoulder 2 or More Views Left (Final result)  Result time 03/06/25 09:24:08      Final result by Andrea Orellana MD (03/06/25 09:24:08)                   Impression:      No acute osseous process identified.      Electronically signed by: Andrea Orellana  Date:    03/06/2025  Time:    09:24               Narrative:    EXAMINATION:  XR SHOULDER COMPLETE 2 OR MORE VIEWS LEFT    CLINICAL HISTORY:  Patient complaining left shoulder pain;    TECHNIQUE:  Two or three views of the left shoulder.    COMPARISON:  None    FINDINGS:  No acute fracture identified.  Glenohumeral and AC joints are aligned.  Mild degenerative changes at the AC joint.  No defined rotator cuff calcification.                                       Medications   ketorolac injection 30 mg (30 mg Intramuscular Given 3/6/25 0845)   cyclobenzaprine tablet 10 mg (10 mg Oral Given 3/6/25 0845)   dexAMETHasone injection 8 mg (8 mg Intramuscular Given 3/6/25 0957)   fentaNYL injection 50 mcg (50 mcg Intramuscular Given 3/6/25 0956)     Medical Decision Making  Patient is a 59-year-old male who presents to ER complaining of left-sided shoulder/neck pain that started yesterday.    Differential diagnosis includes but not limited to: Cervical radiculopathy, shoulder impingement, arthritis    X-ray imaging of the C-spine, shoulder obtained.  Shoulder negative for any acute osseous abnormality.  Cervical spine x-ray did note lower cervical degenerative changes.  Suspect this is likely etiology of patient's symptoms here today.  Patient was given lidocaine patches, muscle relaxants, NSAIDs, IM fentanyl, Decadron, currently now feeling much better.  Will discharge home, recommend he follow with his PCP, and orthopedic surgery for further management.  He endorses understanding.  Strict return precautions given if symptoms worsen, change that he may return to ER  at any point in time for reassessment/re-evaluation.  He endorses understanding, will be going home with significant other bedside who will be driving.    Hudson Dougherty M.D.  Emergency Medicine          Amount and/or Complexity of Data Reviewed  Radiology: ordered. Decision-making details documented in ED Course.    Risk  Prescription drug management.               ED Course as of 03/06/25 1434   Thu Mar 06, 2025   0910 X-Ray Cervical Spine AP And Lateral [KENNA]      ED Course User Index  [KENNA] Hudson Dougherty MD                           Clinical Impression:  Final diagnoses:  [M54.12] Cervical radiculopathy (Primary)          ED Disposition Condition    Discharge Stable          ED Prescriptions       Medication Sig Dispense Start Date End Date Auth. Provider    cyclobenzaprine (FLEXERIL) 10 MG tablet Take 1 tablet (10 mg total) by mouth 3 (three) times daily as needed for Muscle spasms. 15 tablet 3/6/2025 3/11/2025 Hudson Dougherty MD    ketorolac (TORADOL) 10 mg tablet Take 1 tablet (10 mg total) by mouth every 6 (six) hours as needed for Pain. 15 tablet 3/6/2025 -- Hudson Dougherty MD    LIDOcaine (LIDODERM) 5 % Place 1 patch onto the skin once daily. Remove & Discard patch within 12 hours or as directed by MD 30 patch 3/6/2025 -- Hudson Dougherty MD          Follow-up Information       Follow up With Specialties Details Why Contact Info    Jessi Leyva, FNP Family Medicine Schedule an appointment as soon as possible for a visit in 2 weeks For follow up [address] [facility]  [address]  [facility] [address]  [phone]      Jose A Chen MD Orthopedic Surgery Schedule an appointment as soon as possible for a visit in 2 days For follow up [address]   [facility] [address]  [phone]      [facility] - Emergency Dept Emergency Medicine  If symptoms worsen [address] [address] [phone]               [1]   Social History  Tobacco Use    Smoking status: Every Day      Current packs/day: 0.50     Types: Cigarettes    Smokeless tobacco: Never   Substance Use Topics    Alcohol use: Never    Drug use: Never        Hudson Dougherty MD  03/06/25 5241

## 2025-03-08 ENCOUNTER — HOSPITAL ENCOUNTER (EMERGENCY)
Facility: HOSPITAL | Age: 60
Discharge: HOME | End: 2025-03-08
Attending: EMERGENCY MEDICINE

## 2025-03-08 VITALS
RESPIRATION RATE: 18 BRPM | SYSTOLIC BLOOD PRESSURE: 156 MMHG | OXYGEN SATURATION: 97 % | WEIGHT: 165 LBS | DIASTOLIC BLOOD PRESSURE: 93 MMHG | BODY MASS INDEX: 27.49 KG/M2 | TEMPERATURE: 98 F | HEART RATE: 98 BPM | HEIGHT: 65 IN

## 2025-03-08 DIAGNOSIS — M25.512 ACUTE PAIN OF LEFT SHOULDER: Primary | ICD-10-CM

## 2025-03-08 PROCEDURE — 25000003 PHARM REV CODE 250: Performed by: EMERGENCY MEDICINE

## 2025-03-08 PROCEDURE — 99284 EMERGENCY DEPT VISIT MOD MDM: CPT

## 2025-03-08 RX ORDER — GABAPENTIN 300 MG/1
300 CAPSULE ORAL 3 TIMES DAILY
Status: DISCONTINUED | OUTPATIENT
Start: 2025-03-08 | End: 2025-03-08 | Stop reason: HOSPADM

## 2025-03-08 RX ORDER — HYDROCODONE BITARTRATE AND ACETAMINOPHEN 7.5; 325 MG/1; MG/1
1 TABLET ORAL EVERY 6 HOURS PRN
Qty: 12 TABLET | Refills: 0 | Status: SHIPPED | OUTPATIENT
Start: 2025-03-08 | End: 2025-03-11 | Stop reason: SDUPTHER

## 2025-03-08 RX ORDER — GABAPENTIN 300 MG/1
300 CAPSULE ORAL 3 TIMES DAILY
Qty: 30 CAPSULE | Refills: 0 | Status: ON HOLD | OUTPATIENT
Start: 2025-03-08 | End: 2025-03-18

## 2025-03-08 RX ORDER — HYDROCODONE BITARTRATE AND ACETAMINOPHEN 10; 325 MG/1; MG/1
1 TABLET ORAL
Refills: 0 | Status: COMPLETED | OUTPATIENT
Start: 2025-03-08 | End: 2025-03-08

## 2025-03-08 RX ADMIN — HYDROCODONE BITARTRATE AND ACETAMINOPHEN 1 TABLET: 10; 325 TABLET ORAL at 08:03

## 2025-03-08 RX ADMIN — GABAPENTIN 300 MG: 300 CAPSULE ORAL at 08:03

## 2025-03-09 NOTE — DISCHARGE INSTRUCTIONS
Do not drive while taking Norco or Neurontin as it can make you sleepy.  Please follow-up with your primary care doctor on Monday as you may need to have an outpatient MRI.  Return to the ER with any new worsening symptoms.

## 2025-03-09 NOTE — ED PROVIDER NOTES
Encounter Date: 3/8/2025       History     Chief Complaint   Patient presents with    Shoulder Pain     Left sided neck and shoulder pain, started two days ago. Seen here yesterday was given medications without relief.  Worse with movement of left arm and to palpation.     Neck Pain     Patient is a 59-year-old male with history of nephrolithiasis and tobacco abuse presents to the emergency department for evaluation of left shoulder pain.  Patient seen yesterday for same symptoms and diagnosed with cervical radiculopathy.  He was sent home with lidocaine patch prescription, Flexeril and Toradol.  Patient reports that his pain was not controlled at all last night.  Unable to get any sleep.  Patient appears uncomfortable but is in no acute distress.  He denies any numbness, tingling or trauma.  No weakness in the extremities.        Review of patient's allergies indicates:  No Known Allergies  Past Medical History:   Diagnosis Date    Kidney stone     passed 2 weeks ago     Past Surgical History:   Procedure Laterality Date    CAROTID STENT       No family history on file.  Social History[1]  Review of Systems   Musculoskeletal:  Positive for arthralgias.       Physical Exam     Initial Vitals [03/08/25 1946]   BP Pulse Resp Temp SpO2   (!) 177/88 98 18 97.8 °F (36.6 °C) 97 %      MAP       --         Physical Exam    Constitutional: He appears well-developed and well-nourished.   HENT:   Head: Normocephalic and atraumatic.   Eyes: EOM are normal.   Neck:   No midline C-spine or  paraspinal tenderness to palpation.   Cardiovascular:  Normal rate, regular rhythm and normal heart sounds.     Exam reveals no friction rub.       No murmur heard.  2+ pulses in the bilateral radials   Pulmonary/Chest: Breath sounds normal. He has no wheezes. He has no rhonchi. He has no rales.   Abdominal: Abdomen is soft. Bowel sounds are normal. He exhibits no distension. There is no abdominal tenderness. There is no rebound and no  guarding.   Musculoskeletal:         General: Normal range of motion.      Comments: Decreased range of motion in the left shoulder.  Tenderness to palpation over the left AC joint and near left thoracic outlet.  Normal range of motion distal to left shoulder.     Neurological: He is alert.   Neurovascularly intact in the left upper extremity.  Sensation equal and intact in the left upper extremity.  Strength 5/5 in the elbow and wrist    Strength 5/5 in lower extremities.  Sensation equal and intact in lower extremities.  Gait is normal.   Skin: Skin is warm and dry. Capillary refill takes less than 2 seconds.   Psychiatric: He has a normal mood and affect. His behavior is normal. Thought content normal.         ED Course   Procedures  Labs Reviewed - No data to display       Imaging Results    None          Medications   HYDROcodone-acetaminophen  mg per tablet 1 tablet (1 tablet Oral Given 3/8/25 2014)     Medical Decision Making  Patient is a 59-year-old male here for evaluation of left shoulder pain.  Diagnosed with cervical radiculopathy yesterday in the ER and sent home with NSAIDs, muscle relaxant and lidocaine patch.  Pain uncontrolled with this regimen at home.  We will trial pain control with hydrocodone and gabapentin.     reviewed last narcotic refill was in July of 2024.    Pain markedly improved with Norco and gabapentin here.  Patient reports he would like to go home now.  We will prescribe short course of Norco and gabapentin.  Advised he needs to follow up with primary care for physical therapy versus further imaging of the right shoulder.  Patient agreeable with this plan.  Advised the patient can not drive while taking Norco and gabapentin.  Patient not driving home from ER visit today as he is accompanied by his significant other.  Gave strict return precautions.  All questions answered.  Patient has no further concerns at this time.    Risk  Prescription drug management.                ED Course as of 03/09/25 0131   Sat Mar 08, 2025   2136 Patient's pain now 3/10. ROM improved. Plan to discharge with short course of opioids and gabapentin. Patient to be driven home by spouse. Advised to follow up with PCP on Monday. They are agreeable with plan. Discussed strict return precautions.  [KH]      ED Course User Index  [KH] Azra Mclean MD                           Clinical Impression:  Final diagnoses:  [M25.512] Acute pain of left shoulder (Primary)          ED Disposition Condition    Discharge Stable          ED Prescriptions       Medication Sig Dispense Start Date End Date Auth. Provider    gabapentin (NEURONTIN) 300 MG capsule Take 1 capsule (300 mg total) by mouth 3 (three) times daily. for 10 days 30 capsule 3/8/2025 3/18/2025 Azra Mclean MD    HYDROcodone-acetaminophen (NORCO) 7.5-325 mg per tablet Take 1 tablet by mouth every 6 (six) hours as needed for Pain. 12 tablet 3/8/2025 3/15/2025 Azra Mclean MD          Follow-up Information       Follow up With Specialties Details Why Contact Info    Jessi Leyva, FNP Family Medicine In 3 days As needed [address]  [address]  [facility] [address] [phone]      [facility] - Emergency Dept Emergency Medicine  As needed, If symptoms worsen [address] [facility] [address] [phone]               [1]   Social History  Tobacco Use    Smoking status: Every Day     Current packs/day: 0.50     Types: Cigarettes    Smokeless tobacco: Never   Substance Use Topics    Alcohol use: Never    Drug use: Never        Azra Mclean MD  03/09/25 0131

## 2025-03-09 NOTE — ED NOTES
"Pt noted to be more relaxed, no longer rocking in pain. Reports pain is much better but still in pain. Rates 7/10, states "it was 20/10 when I came in."  "

## 2025-03-09 NOTE — ED NOTES
Pt requesting water to drink. Informed pt NPO until assessed by MD, reassured pt MD will be in as soon as able & will bring water then.

## 2025-03-11 ENCOUNTER — OFFICE VISIT (OUTPATIENT)
Dept: FAMILY MEDICINE | Facility: CLINIC | Age: 60
End: 2025-03-11

## 2025-03-11 ENCOUNTER — TELEPHONE (OUTPATIENT)
Dept: FAMILY MEDICINE | Facility: CLINIC | Age: 60
End: 2025-03-11

## 2025-03-11 VITALS
TEMPERATURE: 98 F | HEART RATE: 97 BPM | WEIGHT: 160.69 LBS | HEIGHT: 65 IN | BODY MASS INDEX: 26.77 KG/M2 | DIASTOLIC BLOOD PRESSURE: 77 MMHG | SYSTOLIC BLOOD PRESSURE: 138 MMHG

## 2025-03-11 DIAGNOSIS — M25.512 ACUTE PAIN OF LEFT SHOULDER: ICD-10-CM

## 2025-03-11 DIAGNOSIS — M54.12 CERVICAL RADICULOPATHY: Primary | ICD-10-CM

## 2025-03-11 DIAGNOSIS — E78.00 ELEVATED CHOLESTEROL: ICD-10-CM

## 2025-03-11 PROCEDURE — 99214 OFFICE O/P EST MOD 30 MIN: CPT | Mod: ,,, | Performed by: NURSE PRACTITIONER

## 2025-03-11 PROCEDURE — G2211 COMPLEX E/M VISIT ADD ON: HCPCS | Mod: ,,, | Performed by: NURSE PRACTITIONER

## 2025-03-11 RX ORDER — CYCLOBENZAPRINE HCL 10 MG
10 TABLET ORAL 3 TIMES DAILY PRN
Qty: 15 TABLET | Refills: 0 | Status: ON HOLD | OUTPATIENT
Start: 2025-03-11 | End: 2025-03-16

## 2025-03-11 RX ORDER — HYDROCODONE BITARTRATE AND ACETAMINOPHEN 7.5; 325 MG/1; MG/1
1 TABLET ORAL EVERY 6 HOURS PRN
Qty: 12 TABLET | Refills: 0 | Status: ON HOLD | OUTPATIENT
Start: 2025-03-11 | End: 2025-03-18

## 2025-03-11 RX ORDER — PREDNISONE 20 MG/1
20 TABLET ORAL DAILY
Qty: 5 TABLET | Refills: 0 | Status: ON HOLD | OUTPATIENT
Start: 2025-03-11

## 2025-03-11 RX ORDER — HYDROCODONE BITARTRATE AND ACETAMINOPHEN 7.5; 325 MG/1; MG/1
1 TABLET ORAL EVERY 6 HOURS PRN
Qty: 12 TABLET | Refills: 0 | Status: CANCELLED | OUTPATIENT
Start: 2025-03-11 | End: 2025-03-18

## 2025-03-11 RX ORDER — FENOFIBRATE 160 MG/1
160 TABLET ORAL DAILY
Qty: 90 TABLET | Refills: 1 | Status: ON HOLD | OUTPATIENT
Start: 2025-03-11

## 2025-03-11 RX ORDER — DICLOFENAC SODIUM 50 MG/1
50 TABLET, DELAYED RELEASE ORAL 3 TIMES DAILY PRN
Qty: 30 TABLET | Refills: 0 | Status: ON HOLD | OUTPATIENT
Start: 2025-03-11

## 2025-03-11 NOTE — TELEPHONE ENCOUNTER
----- Message from KAYA Graves sent at 3/11/2025 12:49 PM CDT -----  Regarding: RE: call back  Patient seen in office today.  ----- Message -----  From: Domingo Blanco LPN  Sent: 3/10/2025   8:26 AM CDT  To: KAYA Rodriguez; Michela Zpeeda  Subject: FW: call back                                    Stating the pt is running out of meds and is in severe/unbearable pain, he is hunched up in a ball. Advised to report to the ED for evaluation. Stated it is a pinched nerve and they can not go to ED due to cost and is requesting an appt. Please assist.  ----- Message -----  From: Yanet Gay  Sent: 3/10/2025   8:13 AM CDT  To: Autumn Bowen Staff  Subject: call back                                        .Who Called: Mary Ruiz is requesting assistance/information from provider's office.Symptoms (please be specific):  ER f/uHow long has patient had these symptoms:  List of preferred pharmacies on file (remove unneeded): [unfilled]If different, enter pharmacy into here including location and phone number: Preferred Method of Contact: Phone CallPatient's Preferred Phone Number on File: [phone] Best Call Back Number, if different:Additional Information: pt wife requesting a call back  to discuss pt symptoms  [phone]

## 2025-03-11 NOTE — PROGRESS NOTES
Patient ID: [id number]     Chief Complaint: out patient er f/u (Cervical radiculopathy (Primary) both days , patient pain goes from neck , down left shoulder , needs help to dress , can't use arm , wakes up every 3 hours in pain , er clinical notes 03/06/25 and 03/08/25 presents with pain score of 9 today)      HPI:     Kimani Redd is a 59 y.o. male here today for an ED follow-up.  Patient was seen in ED on two occasions with complaints of left neck and shoulder pain.  Patient was initiated on muscle relaxers, pain medication and anti-inflammatories with mild relief.  Patient reports pain begins to left neck and radiates down to left shoulder into left arm.  Denies any chest pain, palpitations, or shortness for breath.  Patient rates pain 10/10.    Health Maintenance   Topic Date Due    Hepatitis C Screening  Never done    HIV Screening  Never done    TETANUS VACCINE  Never done    Pneumococcal Vaccines (Age 50+) (1 of 2 - PCV) Never done    Colorectal Cancer Screening  Never done    Shingles Vaccine (1 of 2) Never done    Influenza Vaccine (1) Never done    COVID-19 Vaccine (1 - 2024-25 season) Never done    High Dose Statin  03/11/2026    Hemoglobin A1c (Diabetic Prevention Screening)  09/07/2027    Lipid Panel  09/07/2029    RSV Vaccine (Age 60+ and Pregnant patients) (1 - 1-dose 75+ series) 05/29/2042       Past Medical History:  has a past medical history of Kidney stone.    Surgical History:  has a past surgical history that includes Carotid stent.    Family History: family history is not on file.    Social History:  reports that he has been smoking cigarettes. He has never used smokeless tobacco. He reports that he does not drink alcohol and does not use drugs.    Current Outpatient Medications   Medication Instructions    aspirin (ECOTRIN) 81 mg, Daily    cyclobenzaprine (FLEXERIL) 10 mg, Oral, 3 times daily PRN    diclofenac (VOLTAREN) 50 mg, Oral, 3 times daily PRN    fenofibrate 160 mg, Oral, Daily     "gabapentin (NEURONTIN) 300 mg, Oral, 3 times daily    HYDROcodone-acetaminophen (NORCO) 7.5-325 mg per tablet 1 tablet, Oral, Every 6 hours PRN    LIDOcaine (LIDODERM) 5 % 1 patch, Transdermal, Daily, Remove & Discard patch within 12 hours or as directed by MD    metoprolol tartrate (LOPRESSOR) 12.5 mg, Oral, Daily    predniSONE (DELTASONE) 20 mg, Oral, Daily    simvastatin (ZOCOR) 40 mg, Oral, Nightly       Patient has no known allergies.     Patient Care Team:  Jessi Leyva FNP as PCP - General (Family Medicine)  Jessi Leyva FNP (Nurse Practitioner)       Subjective:     Review of Systems    12 point review of systems conducted, negative except as stated in the history of present illness. See HPI for details.      Objective:     Visit Vitals  /77   Pulse 97   Temp 98.1 °F (36.7 °C)   Ht 5' 5" (1.651 m)   Wt 72.9 kg (160 lb 11.2 oz)   SpO2 (P) 98%   BMI 26.74 kg/m²       Physical Exam    General: Alert and oriented, No acute distress.  Head: Normocephalic, Atraumatic.  Eye: Pupils are equal, round and reactive to light, Extraocular movements are intact, Sclera non-icteric.  Ears/Nose/Throat: Normal, Mucosa moist,Clear.  Neck/Thyroid: Supple, Non-tender, No carotid bruit, No lymphadenopathy, No JVD, Full range of motion.  Respiratory: Clear to auscultation bilaterally; No wheezes, rales or rhonchi,Non-labored respirations, Symmetrical chest wall expansion.  Cardiovascular: Regular rate and rhythm, S1/S2 normal, No murmurs, rubs or gallops.  Gastrointestinal: Soft, Non-tender, Non-distended, Normal bowel sounds, No palpable organomegaly.  Musculoskeletal:  Decreased range of motion to left upper extremity, left shoulder tenderness to palpation.  Integumentary: Warm, Dry, Intact, No suspicious lesions or rashes.  Extremities: No clubbing, cyanosis or edema  Neurologic: No focal deficits, Cranial Nerves II-XII are grossly intact, Motor strength normal upper and lower extremities, Sensory exam " intact.  Psychiatric: Normal interaction, Coherent speech, Euthymic mood, Appropriate affect     Labs Reviewed:     Chemistry:  Lab Results   Component Value Date     09/07/2024    K 4.5 09/07/2024    BUN 10.8 09/07/2024    CREATININE 0.75 09/07/2024    EGFRNORACEVR >60 09/07/2024    GLUCOSE 94 09/07/2024    CALCIUM 8.9 09/07/2024    ALKPHOS 125 09/07/2024    LABPROT 6.7 09/07/2024    ALBUMIN 3.7 09/07/2024    BILIDIR 0.05 07/03/2017    IBILI 0.25 07/03/2017    AST 13 09/07/2024    ALT 13 09/07/2024    [id number] 26 (L) 09/07/2024    TSH 1.168 09/07/2024    PSA 0.84 09/07/2024        Lab Results   Component Value Date    HGBA1C 4.8 09/07/2024        Hematology:  Lab Results   Component Value Date    WBC 9.99 09/07/2024    HGB 15.5 09/07/2024    HCT 46.6 09/07/2024     09/07/2024       Lipid Panel:  Lab Results   Component Value Date    CHOL 255 (H) 09/07/2024    HDL 40 09/07/2024    .00 (H) 09/07/2024    TRIG 138 09/07/2024    TOTALCHOLEST 6 (H) 09/07/2024        Urine:  Lab Results   Component Value Date    APPEARANCEUA Clear 06/24/2024    SGUA 1.025 06/24/2024    PROTEINUA Negative 06/24/2024    KETONESUA Negative 06/24/2024    LEUKOCYTESUR Negative 06/24/2024    RBCUA None Seen 06/24/2024    WBCUA None Seen 06/24/2024    BACTERIA None Seen 06/24/2024    CREATRANDUR 71.3 09/07/2024          Assessment/Plan     Assessment & Plan  Cervical radiculopathy  Referral sent to PT.  ED precautions given.  Orders:    cyclobenzaprine (FLEXERIL) 10 MG tablet; Take 1 tablet (10 mg total) by mouth 3 (three) times daily as needed for Muscle spasms.    predniSONE (DELTASONE) 20 MG tablet; Take 1 tablet (20 mg total) by mouth once daily.    Ambulatory referral/consult to Physical/Occupational Therapy; Future    diclofenac (VOLTAREN) 50 MG EC tablet; Take 1 tablet (50 mg total) by mouth 3 (three) times daily as needed (pain).    HYDROcodone-acetaminophen (NORCO) 7.5-325 mg per tablet; Take 1 tablet by mouth  every 6 (six) hours as needed for Pain.    Acute pain of left shoulder  Orders:    cyclobenzaprine (FLEXERIL) 10 MG tablet; Take 1 tablet (10 mg total) by mouth 3 (three) times daily as needed for Muscle spasms.    HYDROcodone-acetaminophen (NORCO) 7.5-325 mg per tablet; Take 1 tablet by mouth every 6 (six) hours as needed for Pain.    Elevated cholesterol  Orders:    fenofibrate 160 MG Tab; Take 1 tablet (160 mg total) by mouth once daily.         Follow up in about 3 months (around 6/11/2025) for Follow Up. In addition to their scheduled follow up, the patient has also been instructed to follow up on as needed basis.     Future Appointments   Date Time Provider Department Center   4/3/2025  1:30 PM Jessi Leyva FNP [facility]   6/11/2025 10:30 AM Jessi Leyva FNP [facility]        KAYA Rodriguez   bed

## 2025-03-14 ENCOUNTER — HOSPITAL ENCOUNTER (EMERGENCY)
Facility: HOSPITAL | Age: 60
Discharge: ANOTHER HOSPITAL | End: 2025-03-15
Attending: EMERGENCY MEDICINE

## 2025-03-14 VITALS
WEIGHT: 165 LBS | TEMPERATURE: 99 F | DIASTOLIC BLOOD PRESSURE: 92 MMHG | HEIGHT: 68 IN | OXYGEN SATURATION: 95 % | SYSTOLIC BLOOD PRESSURE: 146 MMHG | HEART RATE: 89 BPM | BODY MASS INDEX: 25.01 KG/M2 | RESPIRATION RATE: 20 BRPM

## 2025-03-14 DIAGNOSIS — M25.512 ACUTE PAIN OF LEFT SHOULDER: Primary | ICD-10-CM

## 2025-03-14 DIAGNOSIS — M00.9: ICD-10-CM

## 2025-03-14 LAB
ABS NEUT CALC (OHS): 18.88 X10(3)/MCL (ref 2.1–9.2)
ACCEPTIBLE SP GR UR QL: 1.01 (ref 1–1.03)
ALBUMIN SERPL-MCNC: 2.6 G/DL (ref 3.5–5)
ALBUMIN/GLOB SERPL: 0.5 RATIO (ref 1.1–2)
ALP SERPL-CCNC: 123 UNIT/L (ref 40–150)
ALT SERPL-CCNC: 17 UNIT/L (ref 0–55)
AMPHET UR QL SCN: NEGATIVE
ANION GAP SERPL CALC-SCNC: 11 MEQ/L
AST SERPL-CCNC: 10 UNIT/L (ref 5–34)
BARBITURATE SCN PRESENT UR: NEGATIVE
BENZODIAZ UR QL SCN: NEGATIVE
BILIRUB SERPL-MCNC: 0.2 MG/DL
BUN SERPL-MCNC: 18.2 MG/DL (ref 8.4–25.7)
CALCIUM SERPL-MCNC: 9.3 MG/DL (ref 8.4–10.2)
CANNABINOIDS UR QL SCN: NEGATIVE
CHLORIDE SERPL-SCNC: 105 MMOL/L (ref 98–107)
CO2 SERPL-SCNC: 24 MMOL/L (ref 22–29)
COCAINE UR QL SCN: NEGATIVE
CREAT SERPL-MCNC: 0.78 MG/DL (ref 0.72–1.25)
CREAT/UREA NIT SERPL: 23
CRP SERPL-MCNC: 143.4 MG/L
ERYTHROCYTE [DISTWIDTH] IN BLOOD BY AUTOMATED COUNT: 12.4 % (ref 11.5–17)
ERYTHROCYTE [SEDIMENTATION RATE] IN BLOOD: 56 MM/HR (ref 0–15)
FENTANYL UR QL SCN: NEGATIVE
GFR SERPLBLD CREATININE-BSD FMLA CKD-EPI: >60 ML/MIN/1.73/M2
GLOBULIN SER-MCNC: 5.1 GM/DL (ref 2.4–3.5)
GLUCOSE SERPL-MCNC: 125 MG/DL (ref 74–100)
HCT VFR BLD AUTO: 42.7 % (ref 42–52)
HGB BLD-MCNC: 14.2 G/DL (ref 14–18)
LACTATE SERPL-SCNC: 1.3 MMOL/L (ref 0.5–2.2)
LYMPHOCYTES NFR BLD MANUAL: 14 % (ref 13–40)
LYMPHOCYTES NFR BLD MANUAL: 3.57 X10(3)/MCL (ref 0.6–4.6)
MCH RBC QN AUTO: 30.3 PG (ref 27–31)
MCHC RBC AUTO-ENTMCNC: 33.3 G/DL (ref 33–36)
MCV RBC AUTO: 91 FL (ref 80–94)
MONOCYTES NFR BLD MANUAL: 12 % (ref 2–11)
MONOCYTES NFR BLD MANUAL: 3.06 X10(3)/MCL (ref 0.1–1.3)
NEUTROPHILS NFR BLD MANUAL: 74 % (ref 47–80)
OPIATES UR QL SCN: POSITIVE
PCP UR QL: NEGATIVE
PH UR: 5.5 [PH] (ref 3–11)
PLATELET # BLD AUTO: 492 X10(3)/MCL (ref 130–400)
PLATELET # BLD EST: ABNORMAL 10*3/UL
PMV BLD AUTO: 10.5 FL (ref 7.4–10.4)
POTASSIUM SERPL-SCNC: 4.1 MMOL/L (ref 3.5–5.1)
PROT SERPL-MCNC: 7.7 GM/DL (ref 6.4–8.3)
RBC # BLD AUTO: 4.69 X10(6)/MCL (ref 4.7–6.1)
RBC MORPH BLD: NORMAL
SODIUM SERPL-SCNC: 140 MMOL/L (ref 136–145)
WBC # BLD AUTO: 25.52 X10(3)/MCL (ref 4.5–11.5)

## 2025-03-14 PROCEDURE — 85025 COMPLETE CBC W/AUTO DIFF WBC: CPT | Performed by: EMERGENCY MEDICINE

## 2025-03-14 PROCEDURE — 96365 THER/PROPH/DIAG IV INF INIT: CPT

## 2025-03-14 PROCEDURE — 87077 CULTURE AEROBIC IDENTIFY: CPT | Performed by: EMERGENCY MEDICINE

## 2025-03-14 PROCEDURE — 86140 C-REACTIVE PROTEIN: CPT | Performed by: EMERGENCY MEDICINE

## 2025-03-14 PROCEDURE — 80053 COMPREHEN METABOLIC PANEL: CPT | Performed by: EMERGENCY MEDICINE

## 2025-03-14 PROCEDURE — 96366 THER/PROPH/DIAG IV INF ADDON: CPT

## 2025-03-14 PROCEDURE — 96367 TX/PROPH/DG ADDL SEQ IV INF: CPT

## 2025-03-14 PROCEDURE — 99285 EMERGENCY DEPT VISIT HI MDM: CPT | Mod: 25

## 2025-03-14 PROCEDURE — 83605 ASSAY OF LACTIC ACID: CPT | Performed by: EMERGENCY MEDICINE

## 2025-03-14 PROCEDURE — 87154 CUL TYP ID BLD PTHGN 6+ TRGT: CPT | Performed by: EMERGENCY MEDICINE

## 2025-03-14 PROCEDURE — 25000003 PHARM REV CODE 250: Performed by: EMERGENCY MEDICINE

## 2025-03-14 PROCEDURE — 96375 TX/PRO/DX INJ NEW DRUG ADDON: CPT

## 2025-03-14 PROCEDURE — 63600175 PHARM REV CODE 636 W HCPCS: Performed by: EMERGENCY MEDICINE

## 2025-03-14 PROCEDURE — 25500020 PHARM REV CODE 255: Performed by: EMERGENCY MEDICINE

## 2025-03-14 PROCEDURE — 80307 DRUG TEST PRSMV CHEM ANLYZR: CPT | Performed by: EMERGENCY MEDICINE

## 2025-03-14 PROCEDURE — 85652 RBC SED RATE AUTOMATED: CPT | Performed by: EMERGENCY MEDICINE

## 2025-03-14 RX ORDER — HYDROCODONE BITARTRATE AND ACETAMINOPHEN 5; 325 MG/1; MG/1
1 TABLET ORAL ONCE
Refills: 0 | Status: DISCONTINUED | OUTPATIENT
Start: 2025-03-14 | End: 2025-03-14

## 2025-03-14 RX ORDER — MORPHINE SULFATE 4 MG/ML
4 INJECTION, SOLUTION INTRAMUSCULAR; INTRAVENOUS
Refills: 0 | Status: COMPLETED | OUTPATIENT
Start: 2025-03-14 | End: 2025-03-14

## 2025-03-14 RX ORDER — HYDROCODONE BITARTRATE AND ACETAMINOPHEN 5; 325 MG/1; MG/1
1 TABLET ORAL ONCE
Refills: 0 | Status: COMPLETED | OUTPATIENT
Start: 2025-03-14 | End: 2025-03-14

## 2025-03-14 RX ADMIN — IOHEXOL 100 ML: 350 INJECTION, SOLUTION INTRAVENOUS at 08:03

## 2025-03-14 RX ADMIN — HYDROCODONE BITARTRATE AND ACETAMINOPHEN 1 TABLET: 5; 325 TABLET ORAL at 08:03

## 2025-03-14 RX ADMIN — VANCOMYCIN HYDROCHLORIDE 1500 MG: 1.5 INJECTION, POWDER, LYOPHILIZED, FOR SOLUTION INTRAVENOUS at 08:03

## 2025-03-14 RX ADMIN — DEXTROSE MONOHYDRATE 4.5 G: 50 INJECTION, SOLUTION INTRAVENOUS at 10:03

## 2025-03-14 RX ADMIN — MORPHINE SULFATE 4 MG: 4 INJECTION, SOLUTION INTRAMUSCULAR; INTRAVENOUS at 10:03

## 2025-03-15 ENCOUNTER — HOSPITAL ENCOUNTER (INPATIENT)
Facility: HOSPITAL | Age: 60
LOS: 19 days | Discharge: ANOTHER HOSPITAL | DRG: 501 | End: 2025-04-03
Attending: EMERGENCY MEDICINE | Admitting: INTERNAL MEDICINE
Payer: MEDICAID

## 2025-03-15 DIAGNOSIS — M71.012 ABSCESS OF BURSA OF LEFT SHOULDER: Primary | ICD-10-CM

## 2025-03-15 DIAGNOSIS — R07.9 CHEST PAIN: ICD-10-CM

## 2025-03-15 DIAGNOSIS — R78.81 BACTEREMIA: ICD-10-CM

## 2025-03-15 DIAGNOSIS — I42.9 CARDIOMYOPATHY: ICD-10-CM

## 2025-03-15 LAB
ABS NEUT (OLG): 20.92 X10(3)/MCL (ref 2.1–9.2)
ALBUMIN SERPL-MCNC: 2.7 G/DL (ref 3.5–5)
ALBUMIN/GLOB SERPL: 0.6 RATIO (ref 1.1–2)
ALP SERPL-CCNC: 123 UNIT/L (ref 40–150)
ALT SERPL-CCNC: 12 UNIT/L (ref 0–55)
ANION GAP SERPL CALC-SCNC: 13 MEQ/L
AST SERPL-CCNC: 7 UNIT/L (ref 5–34)
BILIRUB SERPL-MCNC: 0.4 MG/DL
BUN SERPL-MCNC: 15.6 MG/DL (ref 8.4–25.7)
CALCIUM SERPL-MCNC: 9 MG/DL (ref 8.4–10.2)
CHLORIDE SERPL-SCNC: 102 MMOL/L (ref 98–107)
CO2 SERPL-SCNC: 23 MMOL/L (ref 22–29)
CREAT SERPL-MCNC: 0.68 MG/DL (ref 0.72–1.25)
CREAT/UREA NIT SERPL: 23
ERYTHROCYTE [DISTWIDTH] IN BLOOD BY AUTOMATED COUNT: 12.6 % (ref 11.5–17)
GFR SERPLBLD CREATININE-BSD FMLA CKD-EPI: >60 ML/MIN/1.73/M2
GLOBULIN SER-MCNC: 4.4 GM/DL (ref 2.4–3.5)
GLUCOSE SERPL-MCNC: 112 MG/DL (ref 74–100)
HCT VFR BLD AUTO: 42.8 % (ref 42–52)
HGB BLD-MCNC: 14.3 G/DL (ref 14–18)
INSTRUMENT WBC (OLG): 26.82 X10(3)/MCL
LYMPHOCYTES NFR BLD MANUAL: 10 %
LYMPHOCYTES NFR BLD MANUAL: 2.68 X10(3)/MCL (ref 0.6–4.6)
MAGNESIUM SERPL-MCNC: 1.8 MG/DL (ref 1.6–2.6)
MCH RBC QN AUTO: 29.7 PG (ref 27–31)
MCHC RBC AUTO-ENTMCNC: 33.4 G/DL (ref 33–36)
MCV RBC AUTO: 88.8 FL (ref 80–94)
MONOCYTES NFR BLD MANUAL: 12 %
MONOCYTES NFR BLD MANUAL: 3.22 X10(3)/MCL (ref 0.1–1.3)
MRSA PCR SCRN (OHS): NOT DETECTED
NEUTROPHILS NFR BLD MANUAL: 78 %
PLATELET # BLD AUTO: 516 X10(3)/MCL (ref 130–400)
PLATELET # BLD EST: ABNORMAL 10*3/UL
PMV BLD AUTO: 10.4 FL (ref 7.4–10.4)
POTASSIUM SERPL-SCNC: 4 MMOL/L (ref 3.5–5.1)
PROT SERPL-MCNC: 7.1 GM/DL (ref 6.4–8.3)
RBC # BLD AUTO: 4.82 X10(6)/MCL (ref 4.7–6.1)
RBC MORPH BLD: NORMAL
SODIUM SERPL-SCNC: 138 MMOL/L (ref 136–145)
WBC # BLD AUTO: 26.82 X10(3)/MCL (ref 4.5–11.5)

## 2025-03-15 PROCEDURE — 21400001 HC TELEMETRY ROOM

## 2025-03-15 PROCEDURE — 96375 TX/PRO/DX INJ NEW DRUG ADDON: CPT

## 2025-03-15 PROCEDURE — 85007 BL SMEAR W/DIFF WBC COUNT: CPT | Performed by: NURSE PRACTITIONER

## 2025-03-15 PROCEDURE — 96374 THER/PROPH/DIAG INJ IV PUSH: CPT

## 2025-03-15 PROCEDURE — 87077 CULTURE AEROBIC IDENTIFY: CPT | Performed by: EMERGENCY MEDICINE

## 2025-03-15 PROCEDURE — 25000003 PHARM REV CODE 250: Performed by: NURSE PRACTITIONER

## 2025-03-15 PROCEDURE — 25000003 PHARM REV CODE 250: Performed by: STUDENT IN AN ORGANIZED HEALTH CARE EDUCATION/TRAINING PROGRAM

## 2025-03-15 PROCEDURE — 83735 ASSAY OF MAGNESIUM: CPT | Performed by: NURSE PRACTITIONER

## 2025-03-15 PROCEDURE — 87641 MR-STAPH DNA AMP PROBE: CPT | Performed by: STUDENT IN AN ORGANIZED HEALTH CARE EDUCATION/TRAINING PROGRAM

## 2025-03-15 PROCEDURE — 63600175 PHARM REV CODE 636 W HCPCS: Performed by: STUDENT IN AN ORGANIZED HEALTH CARE EDUCATION/TRAINING PROGRAM

## 2025-03-15 PROCEDURE — 80053 COMPREHEN METABOLIC PANEL: CPT | Performed by: NURSE PRACTITIONER

## 2025-03-15 PROCEDURE — 99223 1ST HOSP IP/OBS HIGH 75: CPT | Mod: ,,, | Performed by: ORTHOPAEDIC SURGERY

## 2025-03-15 PROCEDURE — S4991 NICOTINE PATCH NONLEGEND: HCPCS | Performed by: STUDENT IN AN ORGANIZED HEALTH CARE EDUCATION/TRAINING PROGRAM

## 2025-03-15 PROCEDURE — 63600175 PHARM REV CODE 636 W HCPCS: Performed by: EMERGENCY MEDICINE

## 2025-03-15 PROCEDURE — 0H9CXZZ DRAINAGE OF LEFT UPPER ARM SKIN, EXTERNAL APPROACH: ICD-10-PCS | Performed by: INTERNAL MEDICINE

## 2025-03-15 PROCEDURE — 99285 EMERGENCY DEPT VISIT HI MDM: CPT

## 2025-03-15 PROCEDURE — 63600175 PHARM REV CODE 636 W HCPCS: Performed by: NURSE PRACTITIONER

## 2025-03-15 RX ORDER — ENOXAPARIN SODIUM 100 MG/ML
40 INJECTION SUBCUTANEOUS EVERY 24 HOURS
Status: DISCONTINUED | OUTPATIENT
Start: 2025-03-15 | End: 2025-04-03 | Stop reason: HOSPADM

## 2025-03-15 RX ORDER — METOPROLOL TARTRATE 25 MG/1
12.5 TABLET ORAL DAILY
Status: DISCONTINUED | OUTPATIENT
Start: 2025-03-15 | End: 2025-03-17

## 2025-03-15 RX ORDER — NALOXONE HCL 0.4 MG/ML
0.02 VIAL (ML) INJECTION
Status: DISCONTINUED | OUTPATIENT
Start: 2025-03-15 | End: 2025-04-03 | Stop reason: HOSPADM

## 2025-03-15 RX ORDER — BISACODYL 10 MG/1
10 SUPPOSITORY RECTAL DAILY PRN
Status: DISCONTINUED | OUTPATIENT
Start: 2025-03-15 | End: 2025-04-03 | Stop reason: HOSPADM

## 2025-03-15 RX ORDER — OXYCODONE HYDROCHLORIDE 5 MG/1
5 TABLET ORAL EVERY 8 HOURS PRN
Refills: 0 | Status: DISCONTINUED | OUTPATIENT
Start: 2025-03-15 | End: 2025-03-17

## 2025-03-15 RX ORDER — ASPIRIN 81 MG/1
81 TABLET ORAL DAILY
Status: DISCONTINUED | OUTPATIENT
Start: 2025-03-15 | End: 2025-04-03 | Stop reason: HOSPADM

## 2025-03-15 RX ORDER — IBUPROFEN 200 MG
16 TABLET ORAL
Status: DISCONTINUED | OUTPATIENT
Start: 2025-03-15 | End: 2025-04-03 | Stop reason: HOSPADM

## 2025-03-15 RX ORDER — MORPHINE SULFATE 4 MG/ML
4 INJECTION, SOLUTION INTRAMUSCULAR; INTRAVENOUS
Refills: 0 | Status: COMPLETED | OUTPATIENT
Start: 2025-03-15 | End: 2025-03-15

## 2025-03-15 RX ORDER — SODIUM CHLORIDE 0.9 % (FLUSH) 0.9 %
10 SYRINGE (ML) INJECTION
Status: DISCONTINUED | OUTPATIENT
Start: 2025-03-15 | End: 2025-04-03 | Stop reason: HOSPADM

## 2025-03-15 RX ORDER — IBUPROFEN 200 MG
1 TABLET ORAL DAILY
Status: DISCONTINUED | OUTPATIENT
Start: 2025-03-15 | End: 2025-04-03 | Stop reason: HOSPADM

## 2025-03-15 RX ORDER — GLUCAGON 1 MG
1 KIT INJECTION
Status: DISCONTINUED | OUTPATIENT
Start: 2025-03-15 | End: 2025-04-03 | Stop reason: HOSPADM

## 2025-03-15 RX ORDER — ALUMINUM HYDROXIDE, MAGNESIUM HYDROXIDE, AND SIMETHICONE 1200; 120; 1200 MG/30ML; MG/30ML; MG/30ML
30 SUSPENSION ORAL 4 TIMES DAILY PRN
Status: DISCONTINUED | OUTPATIENT
Start: 2025-03-15 | End: 2025-04-03 | Stop reason: HOSPADM

## 2025-03-15 RX ORDER — ONDANSETRON HYDROCHLORIDE 2 MG/ML
4 INJECTION, SOLUTION INTRAVENOUS
Status: COMPLETED | OUTPATIENT
Start: 2025-03-15 | End: 2025-03-15

## 2025-03-15 RX ORDER — AMOXICILLIN 250 MG
1 CAPSULE ORAL 2 TIMES DAILY PRN
Status: DISCONTINUED | OUTPATIENT
Start: 2025-03-15 | End: 2025-04-03 | Stop reason: HOSPADM

## 2025-03-15 RX ORDER — IBUPROFEN 200 MG
24 TABLET ORAL
Status: DISCONTINUED | OUTPATIENT
Start: 2025-03-15 | End: 2025-04-03 | Stop reason: HOSPADM

## 2025-03-15 RX ORDER — ONDANSETRON HYDROCHLORIDE 2 MG/ML
4 INJECTION, SOLUTION INTRAVENOUS EVERY 4 HOURS PRN
Status: DISCONTINUED | OUTPATIENT
Start: 2025-03-15 | End: 2025-04-03 | Stop reason: HOSPADM

## 2025-03-15 RX ORDER — ACETAMINOPHEN 500 MG
1000 TABLET ORAL EVERY 6 HOURS PRN
Status: DISCONTINUED | OUTPATIENT
Start: 2025-03-15 | End: 2025-04-03 | Stop reason: HOSPADM

## 2025-03-15 RX ORDER — HYDROMORPHONE HYDROCHLORIDE 2 MG/ML
1 INJECTION, SOLUTION INTRAMUSCULAR; INTRAVENOUS; SUBCUTANEOUS
Refills: 0 | Status: COMPLETED | OUTPATIENT
Start: 2025-03-15 | End: 2025-03-15

## 2025-03-15 RX ORDER — PROCHLORPERAZINE EDISYLATE 5 MG/ML
5 INJECTION INTRAMUSCULAR; INTRAVENOUS EVERY 6 HOURS PRN
Status: DISCONTINUED | OUTPATIENT
Start: 2025-03-15 | End: 2025-04-03 | Stop reason: HOSPADM

## 2025-03-15 RX ORDER — LIDOCAINE HYDROCHLORIDE 10 MG/ML
5 INJECTION, SOLUTION INFILTRATION; PERINEURAL
Status: COMPLETED | OUTPATIENT
Start: 2025-03-15 | End: 2025-03-15

## 2025-03-15 RX ORDER — TALC
6 POWDER (GRAM) TOPICAL NIGHTLY PRN
Status: DISCONTINUED | OUTPATIENT
Start: 2025-03-15 | End: 2025-04-03 | Stop reason: HOSPADM

## 2025-03-15 RX ADMIN — PIPERACILLIN SODIUM AND TAZOBACTAM SODIUM 4.5 G: 4; .5 INJECTION, POWDER, LYOPHILIZED, FOR SOLUTION INTRAVENOUS at 02:03

## 2025-03-15 RX ADMIN — MORPHINE SULFATE 4 MG: 4 INJECTION INTRAVENOUS at 01:03

## 2025-03-15 RX ADMIN — PIPERACILLIN SODIUM AND TAZOBACTAM SODIUM 4.5 G: 4; .5 INJECTION, POWDER, LYOPHILIZED, FOR SOLUTION INTRAVENOUS at 10:03

## 2025-03-15 RX ADMIN — ACETAMINOPHEN 1000 MG: 500 TABLET ORAL at 05:03

## 2025-03-15 RX ADMIN — OXYCODONE HYDROCHLORIDE 5 MG: 5 TABLET ORAL at 05:03

## 2025-03-15 RX ADMIN — VANCOMYCIN HYDROCHLORIDE 1250 MG: 1.25 INJECTION, POWDER, LYOPHILIZED, FOR SOLUTION INTRAVENOUS at 08:03

## 2025-03-15 RX ADMIN — OXYCODONE HYDROCHLORIDE 5 MG: 5 TABLET ORAL at 10:03

## 2025-03-15 RX ADMIN — OXYCODONE HYDROCHLORIDE 5 MG: 5 TABLET ORAL at 01:03

## 2025-03-15 RX ADMIN — METOPROLOL TARTRATE 12.5 MG: 25 TABLET, FILM COATED ORAL at 08:03

## 2025-03-15 RX ADMIN — PIPERACILLIN SODIUM AND TAZOBACTAM SODIUM 4.5 G: 4; .5 INJECTION, POWDER, LYOPHILIZED, FOR SOLUTION INTRAVENOUS at 06:03

## 2025-03-15 RX ADMIN — ONDANSETRON 4 MG: 2 INJECTION INTRAMUSCULAR; INTRAVENOUS at 01:03

## 2025-03-15 RX ADMIN — LIDOCAINE HYDROCHLORIDE 5 ML: 10 INJECTION, SOLUTION INFILTRATION; PERINEURAL at 01:03

## 2025-03-15 RX ADMIN — NICOTINE 1 PATCH: 14 PATCH TRANSDERMAL at 03:03

## 2025-03-15 RX ADMIN — ASPIRIN 81 MG: 81 TABLET, COATED ORAL at 08:03

## 2025-03-15 RX ADMIN — ACETAMINOPHEN 1000 MG: 500 TABLET ORAL at 10:03

## 2025-03-15 RX ADMIN — ACETAMINOPHEN 1000 MG: 500 TABLET ORAL at 04:03

## 2025-03-15 RX ADMIN — Medication 6 MG: at 09:03

## 2025-03-15 RX ADMIN — HYDROMORPHONE HYDROCHLORIDE 1 MG: 2 INJECTION, SOLUTION INTRAMUSCULAR; INTRAVENOUS; SUBCUTANEOUS at 01:03

## 2025-03-15 RX ADMIN — ENOXAPARIN SODIUM 40 MG: 40 INJECTION SUBCUTANEOUS at 06:03

## 2025-03-15 NOTE — ED PROVIDER NOTES
Encounter Date: 3/14/2025       History     Chief Complaint   Patient presents with    Shoulder Pain     Lt shoulder pain- dx w cervical radiculapathy- has seen ER x2  /pcp x1  w no relief this month.     C Lt shoulder pain- dx w cervical radiculapathy- has seen ER x2  /pcp x1  w no relief this month.  Area is not with redness and swelling        Review of patient's allergies indicates:  No Known Allergies  Past Medical History:   Diagnosis Date    Kidney stone     passed 2 weeks ago     Past Surgical History:   Procedure Laterality Date    CAROTID STENT       No family history on file.  Social History[1]  Review of Systems   Constitutional:  Negative for chills and fever.   HENT: Negative.  Negative for congestion, sore throat and trouble swallowing.    Eyes:  Negative for discharge and visual disturbance.   Respiratory:  Negative for cough and shortness of breath.    Cardiovascular:  Negative for chest pain and palpitations.   Gastrointestinal:  Negative for abdominal pain, diarrhea and vomiting.   Endocrine: Negative.    Genitourinary:  Negative for flank pain and hematuria.   Musculoskeletal:  Positive for arthralgias and joint swelling. Negative for myalgias.   Skin:  Negative for rash.   Neurological:  Negative for dizziness and headaches.   Psychiatric/Behavioral:  Negative for hallucinations and suicidal ideas.    All other systems reviewed and are negative.      Physical Exam     Initial Vitals [03/14/25 1854]   BP Pulse Resp Temp SpO2   (!) 169/84 104 20 98.7 °F (37.1 °C) 97 %      MAP       --         Physical Exam    Nursing note and vitals reviewed.  Constitutional: He appears well-developed and well-nourished. No distress.   HENT:   Head: Normocephalic and atraumatic.   Eyes: EOM are normal. Pupils are equal, round, and reactive to light.   Neck: Trachea normal. Neck supple.    Full passive range of motion without pain.     Cardiovascular:  Normal rate, regular rhythm and normal pulses.            Pulmonary/Chest: Breath sounds normal.   Abdominal: Abdomen is soft. Bowel sounds are normal.   Musculoskeletal:        Arms:       Cervical back: Full passive range of motion without pain and neck supple.      Comments: There is a 5 cm area of erythema overlying the area near the left AC joint.  There was no obvious fluctuance to the area.  It is tender to palpation.  There is pain to the area with a downward force on the arm.     Lymphadenopathy:     He has no cervical adenopathy.   Neurological: He is alert and oriented to person, place, and time. He has normal strength. GCS eye subscore is 4. GCS verbal subscore is 5. GCS motor subscore is 6.   Skin: Skin is warm and intact. Capillary refill takes less than 2 seconds.   Psychiatric: He is not actively hallucinating. He expresses no homicidal and no suicidal ideation.         ED Course   Procedures  Labs Reviewed   BLOOD CULTURE [facility] - Abnormal       Result Value    Blood Culture Methicillin Sensitive Staphylococcus aureus (*)     GRAM STAIN Gram Positive Cocci, probable Staphylococcus (*)     GRAM STAIN Seen in gram stain of broth only (*)     GRAM STAIN 2 of 2 bottles positive (*)    BLOOD CULTURE [facility] - Abnormal    Blood Culture Methicillin Sensitive Staphylococcus aureus (*)     GRAM STAIN Seen in gram stain of broth only (*)     GRAM STAIN Gram Positive Cocci, probable Staphylococcus (*)     GRAM STAIN 2 of 2 bottles positive (*)    BLOOD CULTURE ID PANEL - Abnormal    CTX-M (ESBL ) N/A      IMP (Cabapenemase ) N/A      KPC resistance gene (Carbapenemase ) N/A      mcr-1 N/A      mecA ID N/A      mecA/C and MREJ (MRSA) gene Not Detected      NDM (Carbapenemase ) N/A      OXA-48-like (Carbapenemase ) N/A      Bryson/B (VRE gene) N/A      VIM (Carbapenemase ) N/A      Enterococcus faecalis Not Detected      Enterococcus faecium Not Detected      Listeria monocytogenes Not Detected      Staphylococcus spp. See  Species for ID (*)     Staphylococcus aureus Detected (*)     Staphylococcus epidermidis Not Detected      Staphylococcus lugdunensis Not Detected      Streptococcus spp. Not Detected      Streptococcus agalactiae (Group B) Not Detected      Streptococcus pneumoniae Not Detected      Streptococcus pyogenes (Group A) Not Detected      Acinetobacter calcoaceticus/baumannii complex Not Detected      Bacteroides fragilis Not Detected      Enterobacterales Not Detected      Enterobacter cloacae complex Not Detected      Escherichia coli Not Detected      Klebsiella aerogenes Not Detected      Klebsiella oxytoca Not Detected      Klebsiella pneumoniae group Not Detected      Proteus spp. Not Detected      Salmonella spp. Not Detected      Serratia marcescens Not Detected      Haemophilus influenzae Not Detected      Neisseria meningitidis Not Detected      Pseudomonas aeruginosa Not Detected      Stenotrophomonas maltophilia Not Detected      Candida albicans Not Detected      Candida auris Not Detected      Candida glabrata Not Detected      Candida krusei Not Detected      Candida parapsilosis Not Detected      Candida tropicalis Not Detected      Cryptococcus neoformans/gattii Not Detected      Narrative:     The Linguastat BCID2 Panel is a multiplexed nucleic acid test intended for the use with Schrodinger® 2.0 or Schrodinger® Carbon Ads Systems for the simultaneous qualitative detection and identification of multiple bacterial and yeast nucleic acids and select genetic determinants associated with antimicrobial resistance.  The BioFire BCID2 Panel test is performed directly on blood culture samples identified as positive by a continuous monitoring blood culture system.  Results are intended to be interpreted in conjunction with Gram stain results.   COMPREHENSIVE METABOLIC PANEL - Abnormal    Sodium 140      Potassium 4.1      Chloride 105      CO2 24      Glucose 125 (*)     Blood Urea Nitrogen 18.2       Creatinine 0.78      Calcium 9.3      Protein Total 7.7      Albumin 2.6 (*)     Globulin 5.1 (*)     Albumin/Globulin Ratio 0.5 (*)     Bilirubin Total 0.2            ALT 17      AST 10      eGFR >60      Anion Gap 11.0      BUN/Creatinine Ratio 23     SEDIMENTATION RATE, AUTOMATED - Abnormal    Sed Rate 56 (*)    C-REACTIVE PROTEIN - Abnormal    .40 (*)    CBC WITH DIFFERENTIAL - Abnormal    WBC 25.52 (*)     RBC 4.69 (*)     Hgb 14.2      Hct 42.7      MCV 91.0      MCH 30.3      MCHC 33.3      RDW 12.4      Platelet 492 (*)     MPV 10.5 (*)    MANUAL DIFFERENTIAL - Abnormal    Neutrophils % 74      Lymphs % 14      Monocytes % 12 (*)     Neutrophils Abs Calc 18.8848 (*)     Lymphs Abs 3.5728      Monocytes Abs 3.0624 (*)     Platelets Increased (*)     RBC Morph Normal     DRUG SCREEN, URINE (BEAKER) - Abnormal    Amphetamines, Urine Negative      Barbiturates, Urine Negative      Benzodiazepine, Urine Negative      Cannabinoids, Urine Negative      Cocaine, Urine Negative      Opiates, Urine Positive (*)     Phencyclidine, Urine Negative      Fentanyl, Urine Negative      pH, Urine 5.5      Specific Gravity, Urine Auto 1.010      Narrative:     Cut off concentrations:    Amphetamines - 1000 ng/ml  Barbiturates - 200 ng/ml  Benzodiazepine - 200 ng/ml  Cannabinoids (THC) - 50 ng/ml  Cocaine - 300 ng/ml  Fentanyl - 1.0 ng/ml  MDMA - 500 ng/ml  Opiates - 300 ng/ml   Phencyclidine (PCP) - 25 ng/ml    Specimen submitted for drug analysis and tested for pH and specific gravity in order to evaluate sample integrity. Suspect tampering if specific gravity is <1.003 and/or pH is not within the range of 4.5 - 8.0  False negatives may result form substances such as bleach added to urine.  False positives may result for the presence of a substance with similar chemical structure to the drug or its metabolite.    This test provides only a PRELIMINARY analytical test result. A more specific alternate chemical  method must be used in order to obtain a confirmed analytical result. Gas chromatography/mass spectrometry (GC/MS) is the preferred confirmatory method. Other chemical confirmation methods are available. Clinical consideration and professional judgement should be applied to any drug of abuse test result, particularly when preliminary positive results are used.    Positive results will be confirmed only at the physicians request. Unconfirmed screening results are to be used only for medical purposes (treatment).        LACTIC ACID, PLASMA - Normal    Lactic Acid Level 1.3     CBC W/ AUTO DIFFERENTIAL    Narrative:     The following orders were created for panel order CBC auto differential.  Procedure                               Abnormality         Status                     ---------                               -----------         ------                     CBC with Differential[[id number]]       Abnormal            Final result               Manual Differential[[id number]]         Abnormal            Final result                 Please view results for these tests on the individual orders.          Imaging Results              CT Shoulder With Contrast Left (Final result)  Result time 03/15/25 07:47:00      Final result by Hudson Dow MD (03/15/25 07:47:00)                   Narrative:    EXAMINATION  CT SHOULDER WITH CONTRAST LEFT    CLINICAL HISTORY  Septic arthritis suspected, shoulder, xray done;    TECHNIQUE  Post-contrast helical-acquisition CT images of the left shoulder were obtained.  Multiplanar reconstructions accomplished by a CT technologist, pushed to PACS for physician review.    CONTRAST  IV: Omnipaque 350, 100 mL    COMPARISON  6 March 2025    FINDINGS  Images were reviewed in bone and soft tissue windows.    Exam quality: adequate for evaluation    There are mild degenerative changes of the AC joint.  No evidence of acutely displaced fracture, dislocation, or destructive osseous changes.   There is no glenohumeral effusion.  No synovial enhancement is appreciated.    There is disorganized fluid overlying the AC joint with mild surrounding soft tissue enhancement and inflammatory fat stranding.  The remaining visualized subcutaneous tissues and regional musculature are unremarkable.  No acute findings within the chest.    IMPRESSION  1. No convincing acute or destructive osseous process.  2. Suspected subchondral cyst or component of synovitis at the AC joint.  ==========    No significant discrepancy identified in relation to the teleradiology preliminary report.    RADIATION DOSE  Automated tube current modulation, weight-based exposure dosing, and/or iterative reconstruction technique utilized to reach lowest reasonably achievable exposure rate.    DLP: 666 mGy*cm      Electronically signed by: Hudson Dow  Date:    03/15/2025  Time:    07:47                      Preliminary result by Kevin Celeste MD (03/14/25 21:55:25)                   Impression:    1. Unremarkable left shoulder CT. Degenerative changes as above.               Narrative:    START OF REPORT:  TECHNIQUE: CT of the left shoulder was performed with intravenous contrast with direct axial as well as sagittal and coronal reformations.    COMPARISON: Reference to a radiograph of the left shoulder dated 2025-03-06 was done.    HISTORY: Shoulder Pain (Lt shoulder pain- dx w cervical radiculapathy- has seen ER x2 /pcp x1 w no relief this month.).    FINDINGS:  Alignment: Normal.  Mineralization: Normal.  Bones: Intact with no evident fracture.  Degenerative changes: Mild acromioclavicular joint space narrowing is seen which is consistent with degenerative change. Subchondal cyst is noted in the articular of the distal clavicle.                                         Radiology (Final result)  Result time 03/16/25 05:51:26      Final result by Unknown User (03/16/25 05:51:26)                                         Medications    vancomycin 1,500 mg in 0.9% NaCl 250 mL IVPB (admixture device) (0 mg Intravenous Stopped 3/14/25 2212)   iohexoL (OMNIPAQUE 350) injection 100 mL (100 mLs Intravenous Given 3/14/25 2043)   HYDROcodone-acetaminophen 5-325 mg per tablet 1 tablet (1 tablet Oral Given 3/14/25 2008)   piperacillin-tazobactam (ZOSYN) 4.5 g in D5W 100 mL IVPB (MB+) (0 g Intravenous Stopped 3/14/25 2248)   morphine injection 4 mg (4 mg Intravenous Given 3/14/25 2248)     Medical Decision Making  In his for physical exam is consistent with acromioclavicular septic arthritis.  Although a rare condition it does appear to be present currently.  We do not have MRI available.  I will CT the area with contrast but regardless of the result this will not rule out my concern for the diagnosis.  I will request transfer to a facility where orthopedic surgery can evaluate him formally.    Amount and/or Complexity of Data Reviewed  Labs: ordered.  Radiology: ordered.    Risk  Prescription drug management.                                      Clinical Impression:  Final diagnoses:  [M25.512] Acute pain of left shoulder (Primary)  [M00.9] Septic arthritis of left acromioclavicular joint          ED Disposition Condition    Transfer to Another Facility Stable                    [1]   Social History  Tobacco Use    Smoking status: Every Day     Current packs/day: 0.50     Types: Cigarettes    Smokeless tobacco: Never   Substance Use Topics    Alcohol use: Never    Drug use: Never        Rajesh Salguero MD  03/23/25 2039

## 2025-03-15 NOTE — ED PROVIDER NOTES
Encounter Date: 3/14/2025       History     Chief Complaint   Patient presents with    transfer     Pt arrives via [facility] as a transfer from [facility] for concern of septic left shoulder.     This is a 59-year-old male transferred from [facility] for left shoulder pain possible septic joint versus septic bursitis versus cellulitis and abscess over the left shoulder.  Patient has no recent injury to his left shoulder he has had no prior surgeries to his left shoulder.  He said he started having pain about a week ago they initially did a CT scan of his neck thinking it could be radiculopathy but then he started get some redness and warmth over his left shoulder.  Patient got vanc and Zosyn at the previous facility and a 5 mg Norco.  He is noted to have a 25,000 white count elevated CRP and a normal lactic acid.  CT of his left shoulder was unremarkable.    PCP:  Autumn      Review of patient's allergies indicates:  No Known Allergies  Past Medical History:   Diagnosis Date    Kidney stone     passed 2 weeks ago     Past Surgical History:   Procedure Laterality Date    CAROTID STENT       No family history on file.  Social History[1]  Review of Systems   Musculoskeletal:         Left shoulder pain and tenderness       Physical Exam     Initial Vitals [03/15/25 0033]   BP Pulse Resp Temp SpO2   (!) 157/99 102 (!) 21 98.1 °F (36.7 °C) 99 %      MAP       --         Physical Exam    Constitutional: He appears well-developed and well-nourished. No distress.   HENT:   Head: Normocephalic and atraumatic.   Eyes: Conjunctivae and EOM are normal. Pupils are equal, round, and reactive to light. Right eye exhibits no discharge. Left eye exhibits no discharge. No scleral icterus.   Neck: No stridor present. No tracheal deviation present.   Pulmonary/Chest: No stridor.   Abdominal: He exhibits no distension.   Musculoskeletal:      Comments: There is redness tenderness and induration over the patient's left shoulder and  acromion.     Neurological: He is alert and oriented to person, place, and time. He has normal strength and normal reflexes. No cranial nerve deficit.   Skin: Skin is warm and dry. No rash noted. No erythema. No pallor.   Psychiatric: He has a normal mood and affect. His behavior is normal. Judgment and thought content normal.         ED Course   I & D - Incision and Drainage    Date/Time: 3/15/2025 1:50 AM  Location procedure was performed: [facility] EMERGENCY DEPARTMENT    Performed by: Liang Baugh MD  Authorized by: Liang Baugh MD  Type: abscess  Body area: upper extremity  Location details: left shoulder  Anesthesia: local infiltration    Anesthesia:  Local Anesthetic: lidocaine 1% without epinephrine  Risk factor: underlying major vessel, underlying major nerve and coagulopathy  Scalpel size: 11  Incision type: single straight  Incision depth: dermal  Complexity: simple  Drainage: pus  Drainage amount: moderate  Packing material: 1/4 in iodoform gauze    Incision depth: dermal        Labs Reviewed   WOUND CULTURE ([facility])   CBC W/ AUTO DIFFERENTIAL    Narrative:     The following orders were created for panel order CBC Auto Differential.  Procedure                               Abnormality         Status                     ---------                               -----------         ------                     CBC with Differential[[id number]]                                                        Please view results for these tests on the individual orders.   COMPREHENSIVE METABOLIC PANEL   MAGNESIUM   CBC WITH DIFFERENTIAL          Imaging Results    None          Medications   piperacillin-tazobactam (ZOSYN) 4.5 g in D5W 100 mL IVPB (MB+) (has no administration in time range)   morphine injection 4 mg (4 mg Intravenous Given 3/15/25 0110)   ondansetron injection 4 mg (4 mg Intravenous Given 3/15/25 0129)   LIDOcaine HCL 10 mg/ml (1%) injection 5 mL (5 mLs Infiltration Given by Provider 3/15/25  0115)   HYDROmorphone (PF) injection 1 mg (1 mg Intravenous Given 3/15/25 0147)     Medical Decision Making  Amount and/or Complexity of Data Reviewed  Labs: ordered.    Risk  Prescription drug management.  Decision regarding hospitalization.                                      Clinical Impression:  Final diagnoses:  [M71.012] Abscess of bursa of left shoulder (Primary)          ED Disposition Condition    Admit Stable                  Liang Baugh MD  03/15/25 0151         [1]   Social History  Tobacco Use    Smoking status: Every Day     Current packs/day: 0.50     Types: Cigarettes    Smokeless tobacco: Never   Substance Use Topics    Alcohol use: Never    Drug use: Never        Liang Baugh MD  03/15/25 0200

## 2025-03-15 NOTE — PLAN OF CARE
Problem: Adult Inpatient Plan of Care  Goal: Plan of Care Review  Outcome: Progressing  Goal: Patient-Specific Goal (Individualized)  Outcome: Progressing  Goal: Absence of Hospital-Acquired Illness or Injury  Outcome: Progressing  Goal: Optimal Comfort and Wellbeing  Outcome: Progressing  Goal: Readiness for Transition of Care  Outcome: Progressing     Problem: Wound  Goal: Optimal Coping  Outcome: Progressing  Goal: Optimal Functional Ability  Outcome: Progressing  Goal: Absence of Infection Signs and Symptoms  Outcome: Progressing  Goal: Improved Oral Intake  Outcome: Progressing  Goal: Optimal Pain Control and Function  Outcome: Progressing  Goal: Skin Health and Integrity  Outcome: Progressing

## 2025-03-15 NOTE — NURSING
"Admit questions answered per pt, no need for further questioning. Pt states LBM 3/11 reports needing enemas in the past to have Bms. No functional/mobility deficits reported. No fears/concerns, pt "just wanting a more comfortable room". No loss of wt/appetite reported. No feelings of depression reported. Home medication updated per pt and pt family member at bedside. No further complaints/concerns. Pt resting comfortably in bed.   "

## 2025-03-15 NOTE — PROGRESS NOTES
"Pharmacokinetic Initial Assessment: IV Vancomycin    Assessment/Plan:    Received vancomycin 1500 mg IV x1 dose in the ED  Initiate maintenance dose of vancomycin 1250 mg IV every 12 hours  Desired empiric serum trough concentration is 15 to 20 mcg/mL  Draw vancomycin trough level 60 min prior to fourth dose on 03/16 at approximately 0730  Pharmacy will continue to follow and monitor vancomycin.      Please contact pharmacy at extension [phone] with any questions regarding this assessment.     Thank you for the consult,   Evin Bellamy       Patient brief summary:  Kimani Redd is a 59 y.o. male initiated on antimicrobial therapy with IV Vancomycin for treatment of suspected bone/joint infection    Drug Allergies:   Review of patient's allergies indicates:  No Known Allergies    Actual Body Weight:   Wt Readings from Last 1 Encounters:   03/15/25 74.8 kg (165 lb)       Renal Function:   Estimated Creatinine Clearance: 101.1 mL/min (based on SCr of 0.78 mg/dL).,     Dialysis Method (if applicable):  N/A    CBC (last 72 hours):  Recent Labs   Lab Result Units 03/14/25  1955   WBC x10(3)/mcL 25.52*   Hgb g/dL 14.2   Hct % 42.7   Platelet x10(3)/mcL 492*   Monocytes % % 12*       Metabolic Panel (last 72 hours):  Recent Labs   Lab Result Units 03/14/25  1955   Sodium mmol/L 140   Potassium mmol/L 4.1   Chloride mmol/L 105   CO2 mmol/L 24   Glucose mg/dL 125*   Blood Urea Nitrogen mg/dL 18.2   Creatinine mg/dL 0.78   Albumin g/dL 2.6*   Bilirubin Total mg/dL 0.2   ALP unit/L 123   AST unit/L 10   ALT unit/L 17       Drug levels (last 3 results):  No results for input(s): "VANCOMYCINRA", "VANCORANDOM", "VANCOMYCINPE", "VANCOPEAK", "VANCOMYCINTR", "VANCOTROUGH" in the last 72 hours.    Microbiologic Results:  Microbiology Results (last 7 days)       Procedure Component Value Units Date/Time    Wound Culture [[id number]] Collected: 03/15/25 0144    Order Status: Sent Specimen: Abscess from Shoulder, Left Updated: 03/15/25 0145 "

## 2025-03-15 NOTE — ED NOTES
Pt accepted to [facility] ER per Dr Lowry. Report to Francisco JAVED. [facility] notified of transfer. ETA 3hrs

## 2025-03-15 NOTE — CONSULTS
[facility]  Emergency Dept  Orthopedics  Consult Note    Patient Name: Kimani Redd  MRN: [id number]  Admission Date: 3/15/2025  Hospital Length of Stay: 0 days  Attending Provider: Pb Fisher MD  Primary Care Provider: Jessi Leyva FNP        Inpatient consult to Orthopedic Surgery  Consult performed by: Maria Fernanda James PA-C  Consult ordered by: Liang Baugh MD        Subjective:     Principal Problem:<principal problem not specified>    Chief Complaint:   Chief Complaint   Patient presents with    transfer     Pt arrives via [facility] as a transfer from [facility] for concern of septic left shoulder.        HPI:  59-year-old male transferred to the emergency department for left shoulder abscess.  Orthopedics consulted to evaluate for septic bursitis/septic arthritis of left shoulder.  Patient received I&D while in the emergency department and states his shoulder feels better nail.  Symptoms began approximately a week ago some soreness in the anterior shoulder.  He noticed a red lump appear that has continued to grow.  His sister who is a nurse looked at his shoulder and instructed him to be medically evaluated.  Patient denies fever/chills.  Reports he does have some pain with movement of his left upper extremity but tolerates passive range of motion.  Feels well at this moment.  No other complaints at this time.    Past Medical History:   Diagnosis Date    Kidney stone     passed 2 weeks ago       Past Surgical History:   Procedure Laterality Date    CAROTID STENT         Review of patient's allergies indicates:  No Known Allergies    Current Facility-Administered Medications   Medication    acetaminophen tablet 1,000 mg    aluminum-magnesium hydroxide-simethicone 200-200-20 mg/5 mL suspension 30 mL    bisacodyL suppository 10 mg    dextrose 50% injection 12.5 g    dextrose 50% injection 25 g    glucagon (human recombinant) injection 1 mg    glucose chewable tablet 16 g    glucose  chewable tablet 24 g    melatonin tablet 6 mg    naloxone 0.4 mg/mL injection 0.02 mg    ondansetron injection 4 mg    oxyCODONE immediate release tablet 5 mg    piperacillin-tazobactam (ZOSYN) 4.5 g in D5W 100 mL IVPB (MB+)    prochlorperazine injection Soln 5 mg    senna-docusate 8.6-50 mg per tablet 1 tablet    sodium chloride 0.9% flush 10 mL    vancomycin - pharmacy to dose    vancomycin 1,250 mg in 0.9% NaCl 250 mL IVPB (admixture device)     Current Outpatient Medications   Medication Sig    aspirin (ECOTRIN) 81 MG EC tablet Take 81 mg by mouth once daily.    cyclobenzaprine (FLEXERIL) 10 MG tablet Take 1 tablet (10 mg total) by mouth 3 (three) times daily as needed for Muscle spasms.    diclofenac (VOLTAREN) 50 MG EC tablet Take 1 tablet (50 mg total) by mouth 3 (three) times daily as needed (pain).    fenofibrate 160 MG Tab Take 1 tablet (160 mg total) by mouth once daily.    gabapentin (NEURONTIN) 300 MG capsule Take 1 capsule (300 mg total) by mouth 3 (three) times daily. for 10 days    HYDROcodone-acetaminophen (NORCO) 7.5-325 mg per tablet Take 1 tablet by mouth every 6 (six) hours as needed for Pain.    LIDOcaine (LIDODERM) 5 % Place 1 patch onto the skin once daily. Remove & Discard patch within 12 hours or as directed by MD    metoprolol tartrate (LOPRESSOR) 25 MG tablet Take 0.5 tablets (12.5 mg total) by mouth once daily.    predniSONE (DELTASONE) 20 MG tablet Take 1 tablet (20 mg total) by mouth once daily.    simvastatin (ZOCOR) 40 MG tablet TAKE ONE TABLET BY MOUTH EVERY EVENING     Family History    None       Tobacco Use    Smoking status: Every Day     Current packs/day: 0.50     Types: Cigarettes    Smokeless tobacco: Never   Substance and Sexual Activity    Alcohol use: Never    Drug use: Never    Sexual activity: Not Currently     ROS  Objective:     Vital Signs (Most Recent):  Temp: 98.1 °F (36.7 °C) (03/15/25 0033)  Pulse: (!) 119 (03/15/25 0400)  Resp: 19 (03/15/25 0553)  BP: (!) 133/92  "(03/15/25 0400)  SpO2: (!) 92 % (03/15/25 0400) Vital Signs (24h Range):  Temp:  [98.1 °F (36.7 °C)-98.7 °F (37.1 °C)] 98.1 °F (36.7 °C)  Pulse:  [] 119  Resp:  [12-21] 19  SpO2:  [90 %-99 %] 92 %  BP: (133-169)/() 133/92     Weight: 74.8 kg (165 lb)  Height: 5' 8" (172.7 cm)  Body mass index is 25.09 kg/m².    No intake or output data in the 24 hours ending 03/15/25 0721    Ortho/SPM Exam  General the patient is alert and oriented x3 no acute distress nontoxic-appearing appropriate affect.      Constitutional: Vital signs are examined and stable.  Resp: No signs of labored breathing  Head and neck:  EOMI.  Normocephalic.    LUE:  Bandage to the left shoulder removed.  Purplish erythematous has to anterior shoulder with centralized packing.  No active draining or bleeding.  No tenderness to palpation of shoulder with the exception of abscess.  Tolerates passive range of motion of shoulder with abduction and flexion with only mild discomfort.  Tolerates passive range of motion of elbow without pain or limitation.  Gross motor intact distally.  Light sensation intact.  Palpable radial pulse.  Compartments soft and compressible.      Significant Labs: CBC:   Recent Labs   Lab 03/14/25  1955 03/15/25  0441   WBC 25.52* 26.82  26.82*   HGB 14.2 14.3   HCT 42.7 42.8   * 516*     CMP:   Recent Labs   Lab 03/14/25  1955 03/15/25  0441    138   K 4.1 4.0    102   CO2 24 23   BUN 18.2 15.6   CREATININE 0.78 0.68*   CALCIUM 9.3 9.0   ALBUMIN 2.6* 2.7*   BILITOT 0.2 0.4   ALKPHOS 123 123   AST 10 7   ALT 17 12     All pertinent labs within the past 24 hours have been reviewed.    Significant Imaging: I have reviewed all pertinent imaging results/findings.     Assessment/Plan:     There are no hospital problems to display for this patient.    Patient has an abscess to the left shoulder that does not involve shoulder joint.  No surgical intervention indicated at this time.  Patient reports " improvement with bedside I and D in the emergency department.  Leukocytosis noted.  Lactate within normal limits.  Cultures pending.  Defer to primary team for IV versus oral antibiotics.  Okay to follow up in clinic with Dr. Jefferson in 1-2 weeks.  Allowed time for questions.  Questions answered to pt satisfaction.  Pt verbalizes understanding and agrees with tx plan. Pt to call clinic with any questions/concerns.      Pt has acute injury with risk of severe bodily function with their injury.     The above findings, diagnostics, and treatment plan were discussed with Dr. Jefferson who is in agreement with the plan of care except as stated in additional documentation.       Maria Fernanda James PA-C  Orthopedic Trauma Surgery  [facility] - Emergency Dept

## 2025-03-15 NOTE — H&P
[facility] Hospital Medicine History & Physical Examination       Patient Name: Kimani Redd  MRN: [id number]  Patient Class: IP- Inpatient   Admission Date: 3/15/2025   Admitting Physician: MELANIE Service   Length of Stay: 0  Attending Physician: June Hester DO  Primary Care Provider: Jessi Leyva FNP  Face-to-Face encounter date: 03/15/2025  Code Status:Full code  Chief Complaint: transfer (Pt arrives via [facility] as a transfer from [facility] for concern of septic left shoulder.)      Screening for Social Drivers for health:  Patient screened for food insecurity, housing instability, transportation needs, utility difficulties, and interpersonal safety (select all that apply as identified as concern)  []Housing or Food  []Transportation Needs  []Utility Difficulties  []Interpersonal safety  [x]None      Patient information was obtained from patient, patient's family, past medical records and ER records.  ED records were reviewed in detail and documented below    HISTORY OF PRESENT ILLNESS:   Kimani Redd is a 59 y.o. male who  has a past medical history of Kidney stone.. The patient presented to [facility] on 3/15/2025 with a primary complaint of left shoulder pain.  Patient arrived from an outlying facility for concern of septic arthritis and abscess over the left shoulder.  Patient has not had no recent injury to his left shoulder or prior surgery.  Patient noted a bump that got worsened along with redness and warmth.  Status post I&D done in the ER.  Patient notes improvement in his shoulder pain and symptoms after I&D.  Labs were significant for leukocytosis along with the elevated inflammatory markers.    Patient was admitted to us for further workup      PAST MEDICAL HISTORY:     Past Medical History:   Diagnosis Date    Kidney stone     passed 2 weeks ago       PAST SURGICAL HISTORY:     Past Surgical History:   Procedure Laterality Date    CAROTID STENT         ALLERGIES:   Patient has no  known allergies.    FAMILY HISTORY:   Reviewed and negative    SOCIAL HISTORY:     Social History     Tobacco Use    Smoking status: Every Day     Current packs/day: 0.50     Types: Cigarettes    Smokeless tobacco: Never   Substance Use Topics    Alcohol use: Never        HOME MEDICATIONS:     Prior to Admission medications    Medication Sig Start Date End Date Taking? Authorizing Provider   aspirin (ECOTRIN) 81 MG EC tablet Take 81 mg by mouth once daily.   Yes Provider, Historical   cyclobenzaprine (FLEXERIL) 10 MG tablet Take 1 tablet (10 mg total) by mouth 3 (three) times daily as needed for Muscle spasms. 3/11/25 3/16/25 Yes Jesis Leyva FNP   diclofenac (VOLTAREN) 50 MG EC tablet Take 1 tablet (50 mg total) by mouth 3 (three) times daily as needed (pain). 3/11/25  Yes Jessi Leyva FNP   fenofibrate 160 MG Tab Take 1 tablet (160 mg total) by mouth once daily. 3/11/25  Yes Jessi Leyva FNP   gabapentin (NEURONTIN) 300 MG capsule Take 1 capsule (300 mg total) by mouth 3 (three) times daily. for 10 days 3/8/25 3/18/25 Yes Azra Mclean MD   HYDROcodone-acetaminophen (NORCO) 7.5-325 mg per tablet Take 1 tablet by mouth every 6 (six) hours as needed for Pain. 3/11/25 3/18/25 Yes Jessi Leyva FNP   LIDOcaine (LIDODERM) 5 % Place 1 patch onto the skin once daily. Remove & Discard patch within 12 hours or as directed by MD 3/6/25  Yes Hudson Dougherty MD   predniSONE (DELTASONE) 20 MG tablet Take 1 tablet (20 mg total) by mouth once daily. 3/11/25  Yes Jessi Leyva FNP   metoprolol tartrate (LOPRESSOR) 25 MG tablet Take 0.5 tablets (12.5 mg total) by mouth once daily. 8/20/24   Jessi Leyva FNP   simvastatin (ZOCOR) 40 MG tablet TAKE ONE TABLET BY MOUTH EVERY EVENING 10/30/23   Jessi Leyva FNP       REVIEW OF SYSTEMS:   Except as documented, all other systems reviewed and negative     PHYSICAL EXAM:     VITAL SIGNS: 24 HRS MIN & MAX LAST   Temp  Min: 98.1  °F (36.7 °C)  Max: 98.9 °F (37.2 °C) 98.9 °F (37.2 °C)   BP  Min: 123/81  Max: 169/84 (!) 149/72   Pulse  Min: 89  Max: 119  91   Resp  Min: 12  Max: 21 14   SpO2  Min: 90 %  Max: 99 % 95 %     General appearance: Well-developed, well-nourished male in no apparent distress.  HENT: Atraumatic head. Moist mucous membranes of oral cavity.  Eyes: Normal extraocular movements.   Neck: Supple.   Lungs: Clear to auscultation bilaterally. No wheezing present.   Heart: Regular rate and rhythm. S1 and S2 present with no murmurs/gallop/rub. No pedal edema. No JVD present.   Abdomen: Soft, non-distended, non-tender. No rebound tenderness/guarding. Bowel sounds are normal.   Extremities: No cyanosis, clubbing, or edema.  Skin: No Rash.   Neuro: Motor and sensory exams grossly intact. Good tone. Muscle strength 5/5 in all 4 extremities  Psych/mental status: Appropriate mood and affect. Responds appropriately to questions.     LABS AND IMAGING:     Recent Labs   Lab 03/14/25  1955 03/15/25  0441   WBC 25.52* 26.82  26.82*   RBC 4.69* 4.82   HGB 14.2 14.3   HCT 42.7 42.8   MCV 91.0 88.8   MCH 30.3 29.7   MCHC 33.3 33.4   RDW 12.4 12.6   * 516*   MPV 10.5* 10.4       Recent Labs   Lab 03/14/25  1955 03/15/25  0441    138   K 4.1 4.0    102   CO2 24 23   BUN 18.2 15.6   CREATININE 0.78 0.68*   CALCIUM 9.3 9.0   MG  --  1.80   ALBUMIN 2.6* 2.7*   ALKPHOS 123 123   ALT 17 12   AST 10 7   BILITOT 0.2 0.4       Microbiology Results (last 7 days)       Procedure Component Value Units Date/Time    Wound Culture [[id number]] Collected: 03/15/25 0144    Order Status: Sent Specimen: Abscess from Shoulder, Left Updated: 03/15/25 0145             CT Shoulder With Contrast Left  EXAMINATION  CT SHOULDER WITH CONTRAST LEFT    CLINICAL HISTORY  Septic arthritis suspected, shoulder, xray done;    TECHNIQUE  Post-contrast helical-acquisition CT images of the left shoulder were obtained.  Multiplanar reconstructions accomplished  by a CT technologist, pushed to PACS for physician review.    CONTRAST  IV: Omnipaque 350, 100 mL    COMPARISON  6 March 2025    FINDINGS  Images were reviewed in bone and soft tissue windows.    Exam quality: adequate for evaluation    There are mild degenerative changes of the AC joint.  No evidence of acutely displaced fracture, dislocation, or destructive osseous changes.  There is no glenohumeral effusion.  No synovial enhancement is appreciated.    There is disorganized fluid overlying the AC joint with mild surrounding soft tissue enhancement and inflammatory fat stranding.  The remaining visualized subcutaneous tissues and regional musculature are unremarkable.  No acute findings within the chest.    IMPRESSION  1. No convincing acute or destructive osseous process.  2. Suspected subchondral cyst or component of synovitis at the AC joint.  ==========    No significant discrepancy identified in relation to the teleradiology preliminary report.    RADIATION DOSE  Automated tube current modulation, weight-based exposure dosing, and/or iterative reconstruction technique utilized to reach lowest reasonably achievable exposure rate.    DLP: 666 mGy*cm    Electronically signed by: Hudson Dow  Date:    03/15/2025  Time:    07:47      ASSESSMENT & PLAN:   Left shoulder abscess status post I and D done on 03/15  Elevated inflammatory markers, secondary to above   Leukocytosis, secondary to above   History of hypertension, tobacco use    Patient is started on broad-spectrum antibiotics of vancomycin and Zosyn   MRSA PCR normal, DC vancomycin   Continue Zosyn   Wound cultures and blood cultures in process   Patient reports improvement in symptoms after I and D   Orthopedic surgery was consulted and recommended to continue antibiotics and monitor clinical response.  No plans for operative intervention at this time   Further recs based on clinical course   Labs in a.m.      VTE Prophylaxis: lovenox    Patient condition:   Stable    __________________________________________________________________________  INPATIENT LIST OF MEDICATIONS     Scheduled Meds:   aspirin  81 mg Oral Daily    metoprolol tartrate  12.5 mg Oral Daily    nicotine  1 patch Transdermal Daily    piperacillin-tazobactam (Zosyn) IV (PEDS and ADULTS) (extended infusion is not appropriate)  4.5 g Intravenous Q8H    vancomycin 1,250 mg in 0.9% NaCl 250 mL IVPB (admixture device)  1,250 mg Intravenous Q12H     Continuous Infusions:  PRN Meds:.  Current Facility-Administered Medications:     acetaminophen, 1,000 mg, Oral, Q6H PRN    aluminum-magnesium hydroxide-simethicone, 30 mL, Oral, QID PRN    bisacodyL, 10 mg, Rectal, Daily PRN    dextrose 50%, 12.5 g, Intravenous, PRN    dextrose 50%, 25 g, Intravenous, PRN    glucagon (human recombinant), 1 mg, Intramuscular, PRN    glucose, 16 g, Oral, PRN    glucose, 24 g, Oral, PRN    melatonin, 6 mg, Oral, Nightly PRN    naloxone, 0.02 mg, Intravenous, PRN    ondansetron, 4 mg, Intravenous, Q4H PRN    oxyCODONE, 5 mg, Oral, Q8H PRN    prochlorperazine, 5 mg, Intravenous, Q6H PRN    senna-docusate 8.6-50 mg, 1 tablet, Oral, BID PRN    sodium chloride 0.9%, 10 mL, Intravenous, PRN    vancomycin - pharmacy to dose, , Intravenous, pharmacy to manage frequency        All diagnosis and differential diagnosis have been reviewed; assessment and plan has been documented; I have personally reviewed the labs and test results that are presently available; I have reviewed the patients medication list; I have reviewed the consulting providers response and recommendations. I have reviewed or attempted to review medical records based upon their availability.    All of the patient and family questions have been addressed and answered. Patient's is agreeable to the above stated plan. I will continue to monitor closely and make adjustments to medical management as needed.    If patient was admitted under observational status it is with my  approval/permission.      June Hester DO  Department of Hospital Medicine  [facility]  03/15/2025

## 2025-03-16 ENCOUNTER — RESULTS FOLLOW-UP (OUTPATIENT)
Dept: EMERGENCY MEDICINE | Facility: HOSPITAL | Age: 60
End: 2025-03-16

## 2025-03-16 LAB
ACB COMPLEX DNA BLD POS QL NAA+NON-PROBE: NOT DETECTED
ANION GAP SERPL CALC-SCNC: 12 MEQ/L
B FRAGILIS DNA BLD POS QL NAA+PROBE: NOT DETECTED
BUN SERPL-MCNC: 13.9 MG/DL (ref 8.4–25.7)
C ALBICANS DNA BLD POS QL NAA+PROBE: NOT DETECTED
C AURIS DNA BLD POS QL NAA+NON-PROBE: NOT DETECTED
C GATTII+NEOFOR DNA CSF QL NAA+NON-PROBE: NOT DETECTED
C GLABRATA DNA BLD POS QL NAA+PROBE: NOT DETECTED
C KRUSEI DNA BLD POS QL NAA+PROBE: NOT DETECTED
C PARAP DNA BLD POS QL NAA+PROBE: NOT DETECTED
C TROPICLS DNA BLD POS QL NAA+PROBE: NOT DETECTED
CALCIUM SERPL-MCNC: 8.8 MG/DL (ref 8.4–10.2)
CHLORIDE SERPL-SCNC: 102 MMOL/L (ref 98–107)
CO2 SERPL-SCNC: 22 MMOL/L (ref 22–29)
COLISTIN RES MCR-1 ISLT/SPM QL: ABNORMAL
CREAT SERPL-MCNC: 0.63 MG/DL (ref 0.72–1.25)
CREAT/UREA NIT SERPL: 22
E CLOAC COMP DNA BLD POS QL NAA+PROBE: NOT DETECTED
E COLI DNA BLD POS QL NAA+PROBE: NOT DETECTED
E FAECALIS+OTHR E SP RRNA BLD POS FISH: NOT DETECTED
E FAECIUM HSP60 BLD POS QL PROBE: NOT DETECTED
ENTEROBACTERALES DNA BLD POS NAA+N-PRB: NOT DETECTED
ERYTHROCYTE [DISTWIDTH] IN BLOOD BY AUTOMATED COUNT: 12.5 % (ref 11.5–17)
ESBL CFT TO CFT-CLAV IC RTO BD POS IMP: ABNORMAL
GFR SERPLBLD CREATININE-BSD FMLA CKD-EPI: >60 ML/MIN/1.73/M2
GLUCOSE SERPL-MCNC: 101 MG/DL (ref 74–100)
GP B STREP DNA CSF QL NAA+NON-PROBE: NOT DETECTED
HAEM INFLU DNA CSF QL NAA+NON-PROBE: NOT DETECTED
HCT VFR BLD AUTO: 44.6 % (ref 42–52)
HGB BLD-MCNC: 14.8 G/DL (ref 14–18)
IMP CARBAPENEMASE ISLT QL IA.RAPID: ABNORMAL
K OXYTOCA OMPA BLD POS QL PROBE: NOT DETECTED
KLEBSIELLA SP DNA BLD POS QL NAA+NON-PRB: NOT DETECTED
KLEBSIELLA SP DNA BLD POS QL NAA+NON-PRB: NOT DETECTED
KPC CARBAPENEMASE ISLT QL IA.RAPID: ABNORMAL
L MONOCYTOG DNA CSF QL NAA+NON-PROBE: NOT DETECTED
MCH RBC QN AUTO: 29.7 PG (ref 27–31)
MCHC RBC AUTO-ENTMCNC: 33.2 G/DL (ref 33–36)
MCV RBC AUTO: 89.4 FL (ref 80–94)
MECA+MECC NOSE QL NAA+PROBE: ABNORMAL
MECA+MECC+MREJ ISLT/SPM QL: NOT DETECTED
N MEN DNA CSF QL NAA+NON-PROBE: NOT DETECTED
NDM CARBAPENEMASE ISLT QL IA.RAPID: ABNORMAL
NRBC BLD AUTO-RTO: 0 %
OXA-48-LIKE CRBPNASE ISLT QL IA.RAPID: ABNORMAL
P AERUGINOSA DNA BLD POS QL NAA+PROBE: NOT DETECTED
PLATELET # BLD AUTO: 518 X10(3)/MCL (ref 130–400)
PMV BLD AUTO: 10.6 FL (ref 7.4–10.4)
POTASSIUM SERPL-SCNC: 4.4 MMOL/L (ref 3.5–5.1)
PROTEUS SP DNA BLD POS QL NAA+PROBE: NOT DETECTED
RBC # BLD AUTO: 4.99 X10(6)/MCL (ref 4.7–6.1)
S AUREUS DNA BLD POS QL NAA+PROBE: DETECTED
S ENT+BONG DNA STL QL NAA+NON-PROBE: NOT DETECTED
S EPIDERMIDIS HSP60 BLD POS QL PROBE: NOT DETECTED
S LUGDUNENSIS SODA BLD POS QL PROBE: NOT DETECTED
S MALTOPH DNA BLD POS QL NAA+PROBE: NOT DETECTED
S MARCESCENS DNA BLD POS QL NAA+PROBE: NOT DETECTED
S PNEUM DNA CSF QL NAA+NON-PROBE: NOT DETECTED
S PYOGENES HSP60 BLD POS QL PROBE: NOT DETECTED
SODIUM SERPL-SCNC: 136 MMOL/L (ref 136–145)
STAPH SP TUF BLD POS QL PROBE: ABNORMAL
STREPTOCOCCUS SP TUF BLD POS QL PROBE: NOT DETECTED
VAN(A+B+C1+C2) GENES ISLT/SPM: ABNORMAL
VANCOMYCIN TROUGH SERPL-MCNC: 2.2 UG/ML (ref 15–20)
VIM CARBAPENEMASE ISLT QL IA.RAPID: ABNORMAL
WBC # BLD AUTO: 25.01 X10(3)/MCL (ref 4.5–11.5)

## 2025-03-16 PROCEDURE — 80048 BASIC METABOLIC PNL TOTAL CA: CPT | Performed by: STUDENT IN AN ORGANIZED HEALTH CARE EDUCATION/TRAINING PROGRAM

## 2025-03-16 PROCEDURE — 85027 COMPLETE CBC AUTOMATED: CPT | Performed by: STUDENT IN AN ORGANIZED HEALTH CARE EDUCATION/TRAINING PROGRAM

## 2025-03-16 PROCEDURE — S4991 NICOTINE PATCH NONLEGEND: HCPCS | Performed by: STUDENT IN AN ORGANIZED HEALTH CARE EDUCATION/TRAINING PROGRAM

## 2025-03-16 PROCEDURE — 36415 COLL VENOUS BLD VENIPUNCTURE: CPT | Performed by: STUDENT IN AN ORGANIZED HEALTH CARE EDUCATION/TRAINING PROGRAM

## 2025-03-16 PROCEDURE — 80202 ASSAY OF VANCOMYCIN: CPT | Performed by: NURSE PRACTITIONER

## 2025-03-16 PROCEDURE — 25000003 PHARM REV CODE 250: Performed by: NURSE PRACTITIONER

## 2025-03-16 PROCEDURE — 21400001 HC TELEMETRY ROOM

## 2025-03-16 PROCEDURE — 63600175 PHARM REV CODE 636 W HCPCS: Performed by: STUDENT IN AN ORGANIZED HEALTH CARE EDUCATION/TRAINING PROGRAM

## 2025-03-16 PROCEDURE — 25000003 PHARM REV CODE 250: Performed by: STUDENT IN AN ORGANIZED HEALTH CARE EDUCATION/TRAINING PROGRAM

## 2025-03-16 PROCEDURE — 63600175 PHARM REV CODE 636 W HCPCS: Performed by: NURSE PRACTITIONER

## 2025-03-16 RX ORDER — HYDROXYZINE PAMOATE 50 MG/1
50 CAPSULE ORAL EVERY 6 HOURS PRN
Status: DISCONTINUED | OUTPATIENT
Start: 2025-03-16 | End: 2025-04-03 | Stop reason: HOSPADM

## 2025-03-16 RX ADMIN — PIPERACILLIN SODIUM AND TAZOBACTAM SODIUM 4.5 G: 4; .5 INJECTION, POWDER, LYOPHILIZED, FOR SOLUTION INTRAVENOUS at 02:03

## 2025-03-16 RX ADMIN — Medication 6 MG: at 08:03

## 2025-03-16 RX ADMIN — OXYCODONE HYDROCHLORIDE 5 MG: 5 TABLET ORAL at 06:03

## 2025-03-16 RX ADMIN — VANCOMYCIN HYDROCHLORIDE 1000 MG: 1 INJECTION, POWDER, LYOPHILIZED, FOR SOLUTION INTRAVENOUS at 08:03

## 2025-03-16 RX ADMIN — ASPIRIN 81 MG: 81 TABLET, COATED ORAL at 09:03

## 2025-03-16 RX ADMIN — ENOXAPARIN SODIUM 40 MG: 40 INJECTION SUBCUTANEOUS at 05:03

## 2025-03-16 RX ADMIN — VANCOMYCIN HYDROCHLORIDE 1500 MG: 1.5 INJECTION, POWDER, LYOPHILIZED, FOR SOLUTION INTRAVENOUS at 12:03

## 2025-03-16 RX ADMIN — HYDROXYZINE PAMOATE 50 MG: 50 CAPSULE ORAL at 08:03

## 2025-03-16 RX ADMIN — ACETAMINOPHEN 1000 MG: 500 TABLET ORAL at 08:03

## 2025-03-16 RX ADMIN — OXYCODONE HYDROCHLORIDE 5 MG: 5 TABLET ORAL at 03:03

## 2025-03-16 RX ADMIN — METOPROLOL TARTRATE 12.5 MG: 25 TABLET, FILM COATED ORAL at 09:03

## 2025-03-16 RX ADMIN — NICOTINE 1 PATCH: 14 PATCH TRANSDERMAL at 09:03

## 2025-03-16 RX ADMIN — HYDROXYZINE PAMOATE 50 MG: 50 CAPSULE ORAL at 10:03

## 2025-03-16 RX ADMIN — ACETAMINOPHEN 1000 MG: 500 TABLET ORAL at 02:03

## 2025-03-16 RX ADMIN — ACETAMINOPHEN 1000 MG: 500 TABLET ORAL at 09:03

## 2025-03-16 NOTE — PROGRESS NOTES
"Patient Name: Kimani Redd  MRN: [id number]  Admission Date: 3/15/2025  Hospital Length of Stay: 1 days  Attending Provider: Pb Fisher MD  Primary Care Provider: Jessi Leyva FNP    Subjective:       Principal Orthopedic Problem:  Left shoulder superficial abscess    Interval History:  He states that the shoulder is feeling better.  He is sitting up and watching television this morning.  No acute issues.  Asking if he can go home.    Review of patient's allergies indicates:  No Known Allergies    Current Facility-Administered Medications   Medication    acetaminophen tablet 1,000 mg    aluminum-magnesium hydroxide-simethicone 200-200-20 mg/5 mL suspension 30 mL    aspirin EC tablet 81 mg    bisacodyL suppository 10 mg    dextrose 50% injection 12.5 g    dextrose 50% injection 25 g    enoxaparin injection 40 mg    glucagon (human recombinant) injection 1 mg    glucose chewable tablet 16 g    glucose chewable tablet 24 g    hydrOXYzine pamoate capsule 50 mg    melatonin tablet 6 mg    metoprolol tartrate (LOPRESSOR) split tablet 12.5 mg    naloxone 0.4 mg/mL injection 0.02 mg    nicotine 14 mg/24 hr 1 patch    ondansetron injection 4 mg    oxyCODONE immediate release tablet 5 mg    prochlorperazine injection Soln 5 mg    senna-docusate 8.6-50 mg per tablet 1 tablet    sodium chloride 0.9% flush 10 mL    vancomycin (VANCOCIN) 1,000 mg in D5W 250 mL IVPB (admixture device)    vancomycin - pharmacy to dose    vancomycin 1,500 mg in D5W 250 mL IVPB (admixture device)     Objective:     Vital Signs (Most Recent):  Temp: 98.3 °F (36.8 °C) (03/16/25 0828)  Pulse: 94 (03/16/25 0828)  Resp: 18 (03/16/25 0828)  BP: 130/82 (03/16/25 0828)  SpO2: 96 % (03/16/25 0828) Vital Signs (24h Range):  Temp:  [98.3 °F (36.8 °C)-99.8 °F (37.7 °C)] 98.3 °F (36.8 °C)  Pulse:  [] 94  Resp:  [14-20] 18  SpO2:  [93 %-96 %] 96 %  BP: (123-149)/(72-87) 130/82     Weight: 74.8 kg (165 lb)  Height: 5' 5" (165.1 cm)  Body " mass index is 27.46 kg/m².      Intake/Output Summary (Last 24 hours) at 3/16/2025 1010  Last data filed at 3/15/2025 1400  Gross per 24 hour   Intake 360 ml   Output --   Net 360 ml       Physical Exam:   Ortho/SPM Exam    General the patient is alert and oriented x3 no acute distress nontoxic-appearing appropriate affect.    Constitutional: Vital signs are examined and stable.  Resp: No signs of labored breathing    Musculoskeletal Exam:  Left shoulder: Dressing removed, packing came out with dressing removal.  Small amount of purulence remains of the base of the wound.  This was expressed and wiped clean.  No spreading erythema noted.  He has improvements in his range motion.  Again no evidence of deep infection, no shoulder joint involvement noted.  Able to perform active range motion of the shoulder without significant pain.  Dry dressing reapplied.  Neurovascularly intact distally.    Diagnostic Findings:   Significant Labs: BMP:   Recent Labs   Lab 03/15/25  0441 03/16/25  0533    136   K 4.0 4.4    102   CO2 23 22   BUN 15.6 13.9   CREATININE 0.68* 0.63*   CALCIUM 9.0 8.8   MG 1.80  --      CBC:   Recent Labs   Lab 03/14/25  1955 03/15/25  0441 03/16/25  0533   WBC 25.52* 26.82  26.82* 25.01*   HGB 14.2 14.3 14.8   HCT 42.7 42.8 44.6   * 516* 518*     CMP:   Recent Labs   Lab 03/14/25  1955 03/15/25  0441 03/16/25  0533    138 136   K 4.1 4.0 4.4    102 102   CO2 24 23 22   BUN 18.2 15.6 13.9   CREATININE 0.78 0.68* 0.63*   CALCIUM 9.3 9.0 8.8   ALBUMIN 2.6* 2.7*  --    BILITOT 0.2 0.4  --    ALKPHOS 123 123  --    AST 10 7  --    ALT 17 12  --      All pertinent labs within the past 24 hours have been reviewed.        Significant Imaging: I have reviewed and interpreted all pertinent imaging results/findings.     Assessment/Plan:     There are no hospital problems to display for this patient.  His abscess was lanced in the emergency department and packing was placed.  The  packing came out today with dressing change.  Only a small amount of purulence remained of the base of the incision, no recurrence of fluctuance in the abscess.  His white count remains elevated.  He is on IV antibiotics.  No indication for operative intervention for septic arthritis of the shoulder.  Continue IV antibiotics per primary team.  Patient may benefit from a wound care consultation for daily dressing changes instructions for packing the wound.  Call with questions or concerns.    This note/OR report was created with the assistance of  voice recognition software or phone  dictation.  There may be transcription errors as a result of using this technology however minimal. Effort has been made to assure accuracy of transcription but any obvious errors or omissions should be clarified with the author of the document.           Ernesto Jefferson MD  Orthopedic Trauma Surgery  [facility] - 5th Floor Med Surg

## 2025-03-16 NOTE — PROGRESS NOTES
[facility] Medicine Progress Note        Chief Complaint: Inpatient Follow-up for     HPI:   Kimani Redd is a 59 y.o. male who  has a past medical history of Kidney stone.. The patient presented to [facility] on 3/15/2025 with a primary complaint of left shoulder pain.  Patient arrived from an outlying facility for concern of septic arthritis and abscess over the left shoulder.  Patient has not had no recent injury to his left shoulder or prior surgery.  Patient noted a bump that got worsened along with redness and warmth.  Status post I&D done in the ER.  Patient notes improvement in his shoulder pain and symptoms after I&D.  Labs were significant for leukocytosis along with the elevated inflammatory markers.     Patient was admitted to us for further workup     Interval Hx:   Patient seen and examined by bedside.  Family by bedside.  Discussed blood culture results.  Patient remains afebrile.  Denies any other concerns at this time.  Frustrated about staying a few more days      Case was discussed with patient's nurse and  on the floor.    Objective/physical exam:  General: In no acute distress, afebrile  Chest: Clear to auscultation bilaterally  Heart: RRR, +S1, S2, no appreciable murmur  Abdomen: Soft, nontender, BS +  MSK: Warm, no lower extremity edema, no clubbing or cyanosis  Neurologic: Alert and oriented x4, Cranial nerve II-XII intact, Strength 5/5 in all 4 extremities    VITAL SIGNS: 24 HRS MIN & MAX LAST   Temp  Min: 98.3 °F (36.8 °C)  Max: 99.8 °F (37.7 °C) 98.3 °F (36.8 °C)   BP  Min: 123/81  Max: 149/78 130/82   Pulse  Min: 87  Max: 108  94   Resp  Min: 14  Max: 20 18   SpO2  Min: 93 %  Max: 96 % 96 %     I have reviewed the following labs:  Recent Labs   Lab 03/14/25  1955 03/15/25  0441 03/16/25  0533   WBC 25.52* 26.82  26.82* 25.01*   RBC 4.69* 4.82 4.99   HGB 14.2 14.3 14.8   HCT 42.7 42.8 44.6   MCV 91.0 88.8 89.4   MCH 30.3 29.7 29.7   MCHC 33.3  33.4 33.2   RDW 12.4 12.6 12.5   * 516* 518*   MPV 10.5* 10.4 10.6*     Recent Labs   Lab 03/14/25  1955 03/15/25  0441 03/16/25  0533    138 136   K 4.1 4.0 4.4    102 102   CO2 24 23 22   BUN 18.2 15.6 13.9   CREATININE 0.78 0.68* 0.63*   CALCIUM 9.3 9.0 8.8   MG  --  1.80  --    ALBUMIN 2.6* 2.7*  --    ALKPHOS 123 123  --    ALT 17 12  --    AST 10 7  --    BILITOT 0.2 0.4  --      Microbiology Results (last 7 days)       Procedure Component Value Units Date/Time    Wound Culture [[id number]]  (Abnormal) Collected: 03/15/25 0144    Order Status: Completed Specimen: Abscess from Shoulder, Left Updated: 03/16/25 0747     Wound Culture Many Staphylococcus aureus             See below for Radiology    Assessment/Plan:  Staph bacteremia likely secondary to left shoulder abscess  Left shoulder abscess status post I and D done on 03/15  Elevated inflammatory markers, secondary to above   Leukocytosis, secondary to above   History of hypertension, tobacco use     2/2 blood cultures positive for Staphylococcus species   Pharmacy consult for vancomycin   Awaiting sensitivities and cultures to fully resolved  Wound cultures also growing many Staph aureus  Can discontinue Zosyn  Also obtain echo  Leukocytosis persists  Patient reports improvement in symptoms after I and D   Orthopedic surgery was consulted and recommended to continue antibiotics and monitor clinical response.  No plans for operative intervention at this time   Further recs based on clinical course   Labs in a.m.     Critical care note:  Critical care diagnosis:  Bacteremia requiring IV antibiotics  Critical care interventions: Hands-on evaluation, review of labs/radiographs/records and discussion with patient and family if present  Critical care time spent: 35 minutes      VTE prophylaxis:  Lovenox    Patient condition:  Stable/Fair/Guarded/ Serious/ Critical    Anticipated discharge and Disposition:   Return      All diagnosis and  differential diagnosis have been reviewed; assessment and plan has been documented; I have personally reviewed the labs and test results that are presently available; I have reviewed the patients medication list; I have reviewed the consulting providers response and recommendations. I have reviewed or attempted to review medical records based upon their availability    All of the patient's questions have been  addressed and answered. Patient's is agreeable to the above stated plan. I will continue to monitor closely and make adjustments to medical management as needed.    Portions of this note dictated using EMR integrated voice recognition software, and may be subject to voice recognition errors not corrected at proofreading. Please contact writer for clarification if needed.   _____________________________________________________________________    Malnutrition Status:  Nutrition consulted. Most recent weight and BMI monitored-     Measurements:  Wt Readings from Last 1 Encounters:   03/15/25 74.8 kg (165 lb)   Body mass index is 27.46 kg/m².    Patient has been screened and assessed by RD.    Malnutrition Type:  Context:    Level:      Malnutrition Characteristic Summary:       Interventions/Recommendations (treatment strategy):        Scheduled Med:   aspirin  81 mg Oral Daily    enoxparin  40 mg Subcutaneous Q24H (prophylaxis, 1700)    metoprolol tartrate  12.5 mg Oral Daily    nicotine  1 patch Transdermal Daily    piperacillin-tazobactam (Zosyn) IV (PEDS and ADULTS) (extended infusion is not appropriate)  4.5 g Intravenous Q8H      Continuous Infusions:     PRN Meds:    Current Facility-Administered Medications:     acetaminophen, 1,000 mg, Oral, Q6H PRN    aluminum-magnesium hydroxide-simethicone, 30 mL, Oral, QID PRN    bisacodyL, 10 mg, Rectal, Daily PRN    dextrose 50%, 12.5 g, Intravenous, PRN    dextrose 50%, 25 g, Intravenous, PRN    glucagon (human recombinant), 1 mg, Intramuscular, PRN    glucose, 16  g, Oral, PRN    glucose, 24 g, Oral, PRN    melatonin, 6 mg, Oral, Nightly PRN    naloxone, 0.02 mg, Intravenous, PRN    ondansetron, 4 mg, Intravenous, Q4H PRN    oxyCODONE, 5 mg, Oral, Q8H PRN    prochlorperazine, 5 mg, Intravenous, Q6H PRN    senna-docusate 8.6-50 mg, 1 tablet, Oral, BID PRN    sodium chloride 0.9%, 10 mL, Intravenous, PRN     Radiology:  I have personally reviewed the following imaging and agree with the radiologist.     CT Shoulder With Contrast Left  EXAMINATION  CT SHOULDER WITH CONTRAST LEFT    CLINICAL HISTORY  Septic arthritis suspected, shoulder, xray done;    TECHNIQUE  Post-contrast helical-acquisition CT images of the left shoulder were obtained.  Multiplanar reconstructions accomplished by a CT technologist, pushed to PACS for physician review.    CONTRAST  IV: Omnipaque 350, 100 mL    COMPARISON  6 March 2025    FINDINGS  Images were reviewed in bone and soft tissue windows.    Exam quality: adequate for evaluation    There are mild degenerative changes of the AC joint.  No evidence of acutely displaced fracture, dislocation, or destructive osseous changes.  There is no glenohumeral effusion.  No synovial enhancement is appreciated.    There is disorganized fluid overlying the AC joint with mild surrounding soft tissue enhancement and inflammatory fat stranding.  The remaining visualized subcutaneous tissues and regional musculature are unremarkable.  No acute findings within the chest.    IMPRESSION  1. No convincing acute or destructive osseous process.  2. Suspected subchondral cyst or component of synovitis at the AC joint.  ==========    No significant discrepancy identified in relation to the teleradiology preliminary report.    RADIATION DOSE  Automated tube current modulation, weight-based exposure dosing, and/or iterative reconstruction technique utilized to reach lowest reasonably achievable exposure rate.    DLP: 666 mGy*cm    Electronically signed by: Hudson  Paloma  Date:    03/15/2025  Time:    07:47      June Hester MD  Department of Hospital Medicine   [facility]   03/16/2025

## 2025-03-16 NOTE — PLAN OF CARE
Problem: Adult Inpatient Plan of Care  Goal: Plan of Care Review  Outcome: Progressing  Goal: Patient-Specific Goal (Individualized)  Outcome: Progressing  Goal: Absence of Hospital-Acquired Illness or Injury  Outcome: Progressing  Goal: Optimal Comfort and Wellbeing  Outcome: Progressing  Goal: Readiness for Transition of Care  Outcome: Progressing     Problem: Wound  Goal: Absence of Infection Signs and Symptoms  Outcome: Progressing  Goal: Improved Oral Intake  Outcome: Progressing  Goal: Optimal Pain Control and Function  Outcome: Progressing  Goal: Skin Health and Integrity  Outcome: Progressing  Goal: Optimal Wound Healing  Outcome: Progressing

## 2025-03-16 NOTE — PROGRESS NOTES
Pharmacokinetic Assessment Follow Up: IV Vancomycin    Vancomycin serum concentration assessment(s):    The trough level was drawn correctly and can be used to guide therapy at this time. The measurement is below the desired definitive target range of 15 to 20 mcg/mL.    Vancomycin Regimen Plan:  Level 2.2 mcg/mL  Reloading with 1500 mg x 1  Change regimen to Vancomycin 1000 mg IV every 8 hours with next serum trough concentration measured at 1000 prior to 1100 dose on 3/17    Scheduled Administration Times        Drug levels (last 3 results):  Recent Labs   Lab Result Units 03/16/25  0533   Vancomycin Trough ug/ml 2.2*       Vancomycin Administrations:  vancomycin given in the last 96 hours                     vancomycin 1,250 mg in 0.9% NaCl 250 mL IVPB (admixture device) (mg) 1,250 mg New Bag 03/15/25 0824    vancomycin 1,500 mg in 0.9% NaCl 250 mL IVPB (admixture device) (mg) 1,500 mg New Bag 03/14/25 2020                    Pharmacy will continue to follow and monitor vancomycin.    Please contact pharmacy at extension [phone] for questions regarding this assessment.    Thank you for the consult,   Elton Castillo       Patient brief summary:  Kimani Redd is a 59 y.o. male initiated on antimicrobial therapy with IV Vancomycin for treatment of bacteremia    The patient's current regimen is 1500 mg x 1 to re-load then 1000 mg q8h    Drug Allergies:   Review of patient's allergies indicates:  No Known Allergies    Actual Body Weight:  Wt Readings from Last 1 Encounters:   03/15/25 74.8 kg (165 lb)       Renal Function:   Estimated Creatinine Clearance: 122.2 mL/min (A) (based on SCr of 0.63 mg/dL (L)).,     Dialysis Method (if applicable):  N/A    CBC (last 72 hours):  Recent Labs   Lab Result Units 03/14/25  1955 03/15/25  0441 03/16/25  0533   WBC x10(3)/mcL 25.52* 26.82  26.82* 25.01*   Hgb g/dL 14.2 14.3 14.8   Hct % 42.7 42.8 44.6   Platelet x10(3)/mcL 492* 516* 518*   Monocytes % % 12* 12  --        Metabolic  Panel (last 72 hours):  Recent Labs   Lab Result Units 03/14/25  1955 03/15/25  0441 03/16/25  0533   Sodium mmol/L 140 138 136   Potassium mmol/L 4.1 4.0 4.4   Chloride mmol/L 105 102 102   CO2 mmol/L 24 23 22   Glucose mg/dL 125* 112* 101*   Blood Urea Nitrogen mg/dL 18.2 15.6 13.9   Creatinine mg/dL 0.78 0.68* 0.63*   Albumin g/dL 2.6* 2.7*  --    Bilirubin Total mg/dL 0.2 0.4  --    ALP unit/L 123 123  --    AST unit/L 10 7  --    ALT unit/L 17 12  --    Magnesium Level mg/dL  --  1.80  --        Microbiologic Results:  Microbiology Results (last 7 days)       Procedure Component Value Units Date/Time    Wound Culture [[id number]]  (Abnormal) Collected: 03/15/25 0144    Order Status: Completed Specimen: Abscess from Shoulder, Left Updated: 03/16/25 0747     Wound Culture Many Staphylococcus aureus

## 2025-03-17 ENCOUNTER — TELEPHONE (OUTPATIENT)
Dept: FAMILY MEDICINE | Facility: CLINIC | Age: 60
End: 2025-03-17

## 2025-03-17 LAB
APICAL FOUR CHAMBER EJECTION FRACTION: 50 %
APICAL TWO CHAMBER EJECTION FRACTION: 43 %
AV INDEX (PROSTH): 0.41
AV MEAN GRADIENT: 16 MMHG
AV PEAK GRADIENT: 25 MMHG
AV VALVE AREA BY VELOCITY RATIO: 1.5 CM²
AV VALVE AREA: 1.4 CM²
AV VELOCITY RATIO: 0.44
BACTERIA WND CULT: ABNORMAL
BASOPHILS # BLD AUTO: 0.05 X10(3)/MCL
BASOPHILS NFR BLD AUTO: 0.2 %
BSA FOR ECHO PROCEDURE: 1.85 M2
CREAT SERPL-MCNC: 0.68 MG/DL (ref 0.72–1.25)
CV ECHO LV RWT: 0.38 CM
DOP CALC AO PEAK VEL: 2.5 M/S
DOP CALC AO VTI: 39.8 CM
DOP CALC LVOT AREA: 3.5 CM2
DOP CALC LVOT DIAMETER: 2.1 CM
DOP CALC LVOT PEAK VEL: 1.1 M/S
DOP CALC LVOT STROKE VOLUME: 56.8 CM3
DOP CALC MV VTI: 23.4 CM
DOP CALCLVOT PEAK VEL VTI: 16.4 CM
E WAVE DECELERATION TIME: 153 MSEC
E/A RATIO: 0.74
E/E' RATIO: 6 M/S
ECHO LV POSTERIOR WALL: 1 CM (ref 0.6–1.1)
EOSINOPHIL # BLD AUTO: 0.15 X10(3)/MCL (ref 0–0.9)
EOSINOPHIL NFR BLD AUTO: 0.7 %
ERYTHROCYTE [DISTWIDTH] IN BLOOD BY AUTOMATED COUNT: 12.4 % (ref 11.5–17)
FRACTIONAL SHORTENING: 15.1 % (ref 28–44)
GFR SERPLBLD CREATININE-BSD FMLA CKD-EPI: >60 ML/MIN/1.73/M2
HCT VFR BLD AUTO: 44.9 % (ref 42–52)
HGB BLD-MCNC: 14.8 G/DL (ref 14–18)
IMM GRANULOCYTES # BLD AUTO: 0.24 X10(3)/MCL (ref 0–0.04)
IMM GRANULOCYTES NFR BLD AUTO: 1.1 %
INTERVENTRICULAR SEPTUM: 0.8 CM (ref 0.6–1.1)
LEFT ATRIUM AREA SYSTOLIC (APICAL 2 CHAMBER): 14.6 CM2
LEFT ATRIUM AREA SYSTOLIC (APICAL 4 CHAMBER): 13 CM2
LEFT ATRIUM SIZE: 3.8 CM
LEFT ATRIUM VOLUME INDEX MOD: 19 ML/M2
LEFT ATRIUM VOLUME MOD: 35 ML
LEFT INTERNAL DIMENSION IN SYSTOLE: 4.5 CM (ref 2.1–4)
LEFT VENTRICLE DIASTOLIC VOLUME INDEX: 74.18 ML/M2
LEFT VENTRICLE DIASTOLIC VOLUME: 135 ML
LEFT VENTRICLE END DIASTOLIC VOLUME APICAL 2 CHAMBER: 84.3 ML
LEFT VENTRICLE END DIASTOLIC VOLUME APICAL 4 CHAMBER: 80.7 ML
LEFT VENTRICLE END SYSTOLIC VOLUME APICAL 2 CHAMBER: 36.8 ML
LEFT VENTRICLE END SYSTOLIC VOLUME APICAL 4 CHAMBER: 31.2 ML
LEFT VENTRICLE MASS INDEX: 95.9 G/M2
LEFT VENTRICLE SYSTOLIC VOLUME INDEX: 50.5 ML/M2
LEFT VENTRICLE SYSTOLIC VOLUME: 92 ML
LEFT VENTRICULAR INTERNAL DIMENSION IN DIASTOLE: 5.3 CM (ref 3.5–6)
LEFT VENTRICULAR MASS: 174.5 G
LV LATERAL E/E' RATIO: 5.1 M/S
LV SEPTAL E/E' RATIO: 9 M/S
LVED V (TEICH): 135.34 ML
LVES V (TEICH): 92.45 ML
LVOT MG: 3 MMHG
LVOT MV: 0.75 CM/S
LYMPHOCYTES # BLD AUTO: 2.79 X10(3)/MCL (ref 0.6–4.6)
LYMPHOCYTES NFR BLD AUTO: 12.9 %
MCH RBC QN AUTO: 29.4 PG (ref 27–31)
MCHC RBC AUTO-ENTMCNC: 33 G/DL (ref 33–36)
MCV RBC AUTO: 89.1 FL (ref 80–94)
MONOCYTES # BLD AUTO: 1.41 X10(3)/MCL (ref 0.1–1.3)
MONOCYTES NFR BLD AUTO: 6.5 %
MV MEAN GRADIENT: 3 MMHG
MV PEAK A VEL: 1.1 M/S
MV PEAK E VEL: 0.81 M/S
MV PEAK GRADIENT: 5 MMHG
MV VALVE AREA BY CONTINUITY EQUATION: 2.43 CM2
NEUTROPHILS # BLD AUTO: 17.06 X10(3)/MCL (ref 2.1–9.2)
NEUTROPHILS NFR BLD AUTO: 78.6 %
NRBC BLD AUTO-RTO: 0 %
OHS LV EJECTION FRACTION SIMPSONS BIPLANE MOD: 45 %
PLATELET # BLD AUTO: 608 X10(3)/MCL (ref 130–400)
PMV BLD AUTO: 10.5 FL (ref 7.4–10.4)
RBC # BLD AUTO: 5.04 X10(6)/MCL (ref 4.7–6.1)
TDI LATERAL: 0.16 M/S
TDI SEPTAL: 0.09 M/S
TDI: 0.13 M/S
TRICUSPID ANNULAR PLANE SYSTOLIC EXCURSION: 2.33 CM
VANCOMYCIN TROUGH SERPL-MCNC: 11.7 UG/ML (ref 15–20)
WBC # BLD AUTO: 21.7 X10(3)/MCL (ref 4.5–11.5)
Z-SCORE OF LEFT VENTRICULAR DIMENSION IN END DIASTOLE: 0.51
Z-SCORE OF LEFT VENTRICULAR DIMENSION IN END SYSTOLE: 2.92

## 2025-03-17 PROCEDURE — 25000003 PHARM REV CODE 250: Performed by: INTERNAL MEDICINE

## 2025-03-17 PROCEDURE — 80202 ASSAY OF VANCOMYCIN: CPT | Performed by: STUDENT IN AN ORGANIZED HEALTH CARE EDUCATION/TRAINING PROGRAM

## 2025-03-17 PROCEDURE — 36415 COLL VENOUS BLD VENIPUNCTURE: CPT | Performed by: INTERNAL MEDICINE

## 2025-03-17 PROCEDURE — S4991 NICOTINE PATCH NONLEGEND: HCPCS | Performed by: STUDENT IN AN ORGANIZED HEALTH CARE EDUCATION/TRAINING PROGRAM

## 2025-03-17 PROCEDURE — 25000003 PHARM REV CODE 250: Performed by: STUDENT IN AN ORGANIZED HEALTH CARE EDUCATION/TRAINING PROGRAM

## 2025-03-17 PROCEDURE — 87040 BLOOD CULTURE FOR BACTERIA: CPT | Performed by: INTERNAL MEDICINE

## 2025-03-17 PROCEDURE — 0J9F0ZZ DRAINAGE OF LEFT UPPER ARM SUBCUTANEOUS TISSUE AND FASCIA, OPEN APPROACH: ICD-10-PCS | Performed by: INTERNAL MEDICINE

## 2025-03-17 PROCEDURE — 21400001 HC TELEMETRY ROOM

## 2025-03-17 PROCEDURE — 63600175 PHARM REV CODE 636 W HCPCS: Performed by: STUDENT IN AN ORGANIZED HEALTH CARE EDUCATION/TRAINING PROGRAM

## 2025-03-17 PROCEDURE — 25000003 PHARM REV CODE 250: Performed by: NURSE PRACTITIONER

## 2025-03-17 PROCEDURE — 82565 ASSAY OF CREATININE: CPT | Performed by: INTERNAL MEDICINE

## 2025-03-17 PROCEDURE — 63600175 PHARM REV CODE 636 W HCPCS: Performed by: INTERNAL MEDICINE

## 2025-03-17 PROCEDURE — 85025 COMPLETE CBC W/AUTO DIFF WBC: CPT | Performed by: INTERNAL MEDICINE

## 2025-03-17 RX ORDER — FENOFIBRATE 145 MG/1
145 TABLET, FILM COATED ORAL DAILY
Status: DISCONTINUED | OUTPATIENT
Start: 2025-03-17 | End: 2025-04-03 | Stop reason: HOSPADM

## 2025-03-17 RX ORDER — CEFAZOLIN SODIUM 2 G/50ML
2 SOLUTION INTRAVENOUS
Status: DISCONTINUED | OUTPATIENT
Start: 2025-03-17 | End: 2025-04-03 | Stop reason: HOSPADM

## 2025-03-17 RX ORDER — METOPROLOL TARTRATE 25 MG/1
25 TABLET, FILM COATED ORAL 2 TIMES DAILY
Status: DISCONTINUED | OUTPATIENT
Start: 2025-03-17 | End: 2025-03-18

## 2025-03-17 RX ORDER — CEFAZOLIN 2 G/1
2 INJECTION, POWDER, FOR SOLUTION INTRAMUSCULAR; INTRAVENOUS
Status: DISCONTINUED | OUTPATIENT
Start: 2025-03-17 | End: 2025-03-17

## 2025-03-17 RX ORDER — OXYCODONE HYDROCHLORIDE 5 MG/1
5 TABLET ORAL EVERY 4 HOURS PRN
Refills: 0 | Status: DISCONTINUED | OUTPATIENT
Start: 2025-03-17 | End: 2025-04-03 | Stop reason: HOSPADM

## 2025-03-17 RX ADMIN — HYDROXYZINE PAMOATE 50 MG: 50 CAPSULE ORAL at 08:03

## 2025-03-17 RX ADMIN — METOPROLOL TARTRATE 12.5 MG: 25 TABLET, FILM COATED ORAL at 08:03

## 2025-03-17 RX ADMIN — OXYCODONE HYDROCHLORIDE 5 MG: 5 TABLET ORAL at 12:03

## 2025-03-17 RX ADMIN — CEFAZOLIN SODIUM 2 G: 2 SOLUTION INTRAVENOUS at 01:03

## 2025-03-17 RX ADMIN — ASPIRIN 81 MG: 81 TABLET, COATED ORAL at 08:03

## 2025-03-17 RX ADMIN — NICOTINE 1 PATCH: 14 PATCH TRANSDERMAL at 09:03

## 2025-03-17 RX ADMIN — ACETAMINOPHEN 1000 MG: 500 TABLET ORAL at 08:03

## 2025-03-17 RX ADMIN — CEFAZOLIN SODIUM 2 G: 2 SOLUTION INTRAVENOUS at 08:03

## 2025-03-17 RX ADMIN — HYDROXYZINE PAMOATE 50 MG: 50 CAPSULE ORAL at 03:03

## 2025-03-17 RX ADMIN — ENOXAPARIN SODIUM 40 MG: 40 INJECTION SUBCUTANEOUS at 06:03

## 2025-03-17 RX ADMIN — OXYCODONE HYDROCHLORIDE 5 MG: 5 TABLET ORAL at 11:03

## 2025-03-17 RX ADMIN — OXYCODONE HYDROCHLORIDE 5 MG: 5 TABLET ORAL at 03:03

## 2025-03-17 RX ADMIN — Medication 6 MG: at 08:03

## 2025-03-17 RX ADMIN — OXYCODONE HYDROCHLORIDE 5 MG: 5 TABLET ORAL at 06:03

## 2025-03-17 RX ADMIN — FENOFIBRATE 145 MG: 145 TABLET, FILM COATED ORAL at 03:03

## 2025-03-17 RX ADMIN — VANCOMYCIN HYDROCHLORIDE 1000 MG: 1 INJECTION, POWDER, LYOPHILIZED, FOR SOLUTION INTRAVENOUS at 03:03

## 2025-03-17 RX ADMIN — ACETAMINOPHEN 1000 MG: 500 TABLET ORAL at 03:03

## 2025-03-17 RX ADMIN — METOPROLOL TARTRATE 25 MG: 25 TABLET, FILM COATED ORAL at 08:03

## 2025-03-17 NOTE — PROCEDURES
"Kimani Redd is a 59 y.o. male patient.    Temp: 98.8 °F (37.1 °C) (03/17/25 1055)  Pulse: 88 (03/17/25 1055)  Resp: 20 (03/17/25 1207)  BP: 125/81 (03/17/25 1055)  SpO2: 95 % (03/17/25 1055)  Weight: 74.8 kg (165 lb) (03/15/25 1202)  Height: 5' 5" (165.1 cm) (03/15/25 1202)       Incision and Drainage    Date/Time: 3/17/2025 1:01 PM  Location procedure was performed: [facility]    Performed by: Ernesto Jefferson MD  Authorized by: Ernesto Jefferson MD  Assisting provider: Maria Fernanda James PA-C  Pre-operative diagnosis: abscess left upper extremity subcutaneous  Post-operative diagnosis: abscess left upper extremity subcutaneous  Consent Done: Yes  Consent: Verbal consent obtained  Risks and benefits: risks, benefits and alternatives were discussed  Consent given by: patient  Patient understanding: patient states understanding of the procedure being performed  Site marked: the operative site was marked  Imaging studies: imaging studies available  Patient identity confirmed: name and verbally with patient  Type: abscess  Body area: upper extremity  Location details: left shoulder  Anesthesia: local infiltration    Anesthesia:  Local Anesthetic: lidocaine 1% without epinephrine and bupivacaine 0.25% without epinephrine  Anesthetic total: 15 mL    Patient sedated: no  Description of findings: purulent drainage from site   Scalpel size: 10  Incision type: single straight  Incision depth: dermal and subcutaneous  Complexity: simple  Drainage: pus  Drainage amount: scant  Wound treatment: expression of material and wound packed  Packing material: 1/4 in gauze  Complications: No  Estimated blood loss (mL): 2  Specimens: No  Implants: No  Patient tolerance: Patient tolerated the procedure well with no immediate complications    Incision depth: dermal and subcutaneous          3/17/2025    "

## 2025-03-17 NOTE — PROGRESS NOTES
"Patient Name: Kimani Redd  MRN: [id number]  Admission Date: 3/15/2025  Hospital Length of Stay: 2 days  Attending Provider: Pb Fisher MD  Primary Care Provider: Jessi Leyva FNP    Subjective:     Principal Orthopedic Problem:  Left shoulder superficial abscess    Interval History:  Seen this morning. Still with pain, erythema, and noted purulent drainage from shoulder.     Review of patient's allergies indicates:  No Known Allergies    Current Facility-Administered Medications   Medication    acetaminophen tablet 1,000 mg    aluminum-magnesium hydroxide-simethicone 200-200-20 mg/5 mL suspension 30 mL    aspirin EC tablet 81 mg    bisacodyL suppository 10 mg    cefazolin (ANCEF) 2 gram in dextrose 5% 50 mL IVPB (premix)    dextrose 50% injection 12.5 g    dextrose 50% injection 25 g    enoxaparin injection 40 mg    glucagon (human recombinant) injection 1 mg    glucose chewable tablet 16 g    glucose chewable tablet 24 g    hydrOXYzine pamoate capsule 50 mg    melatonin tablet 6 mg    metoprolol tartrate (LOPRESSOR) split tablet 12.5 mg    naloxone 0.4 mg/mL injection 0.02 mg    nicotine 14 mg/24 hr 1 patch    ondansetron injection 4 mg    oxyCODONE immediate release tablet 5 mg    prochlorperazine injection Soln 5 mg    senna-docusate 8.6-50 mg per tablet 1 tablet    sodium chloride 0.9% flush 10 mL     Objective:     Vital Signs (Most Recent):  Temp: 98.8 °F (37.1 °C) (03/17/25 1055)  Pulse: 88 (03/17/25 1055)  Resp: 20 (03/17/25 1207)  BP: 125/81 (03/17/25 1055)  SpO2: 95 % (03/17/25 1055) Vital Signs (24h Range):  Temp:  [98.2 °F (36.8 °C)-98.8 °F (37.1 °C)] 98.8 °F (37.1 °C)  Pulse:  [] 88  Resp:  [18-20] 20  SpO2:  [95 %-96 %] 95 %  BP: (124-152)/(72-84) 125/81     Weight: 74.8 kg (165 lb)  Height: 5' 5" (165.1 cm)  Body mass index is 27.46 kg/m².      Intake/Output Summary (Last 24 hours) at 3/17/2025 1253  Last data filed at 3/16/2025 1444  Gross per 24 hour   Intake 480 ml "   Output --   Net 480 ml       Physical Exam:   Ortho/SPM Exam    General the patient is alert and oriented x3 no acute distress nontoxic-appearing appropriate affect.    Constitutional: Vital signs are examined and stable.  Resp: No signs of labored breathing    Musculoskeletal Exam:  Left shoulder: purulence expressed from wound.  Localized erythema noted.  Again no evidence of deep infection, no shoulder joint involvement noted.  Able to perform active range motion    Diagnostic Findings:   Significant Labs: BMP:   Recent Labs   Lab 03/16/25 0533 03/17/25  0948     --    K 4.4  --      --    CO2 22  --    BUN 13.9  --    CREATININE 0.63* 0.68*   CALCIUM 8.8  --      CBC:   Recent Labs   Lab 03/16/25 0533 03/17/25  0756   WBC 25.01* 21.70*   HGB 14.8 14.8   HCT 44.6 44.9   * 608*     CMP:   Recent Labs   Lab 03/16/25  0533 03/17/25  0948     --    K 4.4  --      --    CO2 22  --    BUN 13.9  --    CREATININE 0.63* 0.68*   CALCIUM 8.8  --      All pertinent labs within the past 24 hours have been reviewed.        Significant Imaging: I have reviewed and interpreted all pertinent imaging results/findings.     Assessment/Plan:       abscess was lanced at bedside today, packing was placed- see procedure note  His white count remains elevated.  He is on IV antibiotics.  Bacteremia noted  No indication for operative intervention for septic arthritis of the shoulder.    Continue IV antibiotics per primary team.    wound care consultation for daily dressing changes instructions for packing the wound.   Call with questions or concerns.    Jodi Dennis FNP-C  Orthopedic Trauma Surgery  [facility] - 5th Floor Med Surg

## 2025-03-17 NOTE — TELEPHONE ENCOUNTER
----- Message from KAYA Graves sent at 3/17/2025  9:06 AM CDT -----  Patient admitted to hospital at this time.  ----- Message -----  From: Domingo Blanco LPN  Sent: 3/14/2025  11:30 AM CDT  To: KAYA Rodriguez      ----- Message -----  From: Ameena Glaser  Sent: 3/14/2025  10:26 AM CDT  To: Autumn Bowen Staff    .Who Called: Kimani Singh or New Rx:RefillRX Name and Strength:HYDROcodone-acetaminophen (NORCO) 7.5-325 mg per tabletHow is the patient currently taking it? (ex. 1XDay): every 4 hoursIs this a 30 day or 90 day RX: 15Local or Mail Order:localList of preferred pharmacies on file (remove unneeded): [facility] different Pharmacy is requested, enter Pharmacy information here including location and phone number: Ordering Provider:genomouchePreferred Method of Contact: Phone CallPatient's Preferred Phone Number on File: [phone] Best Call Back Number, if different:Additional Information: HYDROcodone-acetaminophen (NORCO) 7.5-325 mg per tabletPT IS OUT OF ALL MEDS --PLS REFILL ASAP PT IN PAIN--PLS CALL PT TO CONFIRM ONCE DONE .Who Called: Kimani Singh or New Rx:RefillRX Name and Strength:gabapentin (NEURONTIN) 300 MG capsuleHow is the patient currently taking it? (ex. 1XDay):NOT SUREIs this a 30 day or 90 day RX:30Local or Mail Order:localList of preferred pharmacies on file (remove unneeded): [facility] different Pharmacy is requested, enter Pharmacy information here including location and phone number: Ordering Provider:genomouchePreferrsandy Method of Contact: Phone CallPatient's Preferred Phone Number on File: [phone] Best Call Back Number, if different:Additional Information: gabapentin (NEURONTIN) 300 MG capsule

## 2025-03-17 NOTE — CONSULTS
Tele-Infectious Diseases Consultation      Patient Name: Kimani Redd  Patient : 1965  Age/Sex: 59 y.o. male  Room/Bed: 536/536 A  Admission Date/Time: 3/15/2025 12:35 AM  Date of consultation:  3/17/2025  Referring MD: Pb Fisher MD       Assessment and Plan:     Kimani Redd is a 59 y.o. male with:  MSSA bacteremia:  Source unclear  Left shoulder abscess s/p I and D at bedside 3/15 and 3/17, no cultures obtained  Leukocytosis, 2/2 above, Improving    Recommendations:     Obtain 2 sets of blood cultures today  Switch vancomycin to cefazolin 2 g Q 8h  Recommended proceeding with SHRUTHI  Patient will require a course of IV antibiotic upon discharge.  He will need either PICC line or midline depends on how long we are going to treat him with IV antibiotics.  Hold off on placing a long-term catheter until blood cultures negative at 48 hours.  Consider obtaining left shoulder MRI if continues to have pain in the area    The antibiotics being administered require intensive monitoring for drug toxicity    Thank you for allowing us to contribute to this patient's care. Please call ID with any questions.     Diana Fraire MD  Attending, Infectious Disease     Reason for consultation:      Staph aureus bacteremia    Chief complain:      Left shoulder pain    History of present illness:      Kimani Redd is a 59 y.o. male with a history significant for hypertension, tobacco use who was admitted on 3/15/2025 with left shoulder pain. Patient initially went to [facility] with left shoulder pain.  There was a concern for possible septic arthritis.  Patient reports no injury or trauma.  He has been having pain in his left shoulder for the last week or so. Patient received a dose of vancomycin/Zosyn at the outside hospital and then transferred here for further evaluation.  His laboratory workup was remarkable for white cell count of 25,000.  He underwent CT of his left shoulder which was  unremarkable.  He was seen by Orthopedic surgery team.  He underwent I&D at bedside.  He had 2 sets of blood culture obtained which are growing methicillin sensitive Staphylococcus aureus.  He underwent transthoracic echo which showed no clear vegetations.  He noted to have purulent drainage from his left shoulder on 03/16 underwent I&D at bedside.    Review of system:      A review of the patient's history and complaints did not reveal any medical problems other than those outlined in the HPI. Specifically, there were no additional constitutional complaints, and no complaints of problems with the eyes, ears, nose, and mouth, cardiovascular, respiratory, gastrointestinal, musculoskeletal, neurological, integumentary, psychiatric, endocrine, or hematological systems.    Antibiotics Received:     Zosyn 3/14-3/15  Vancomycin 3/14-    Social history:      Social History     Socioeconomic History    Marital status:    Occupational History     Comment: employed   Tobacco Use    Smoking status: Every Day     Current packs/day: 0.50     Types: Cigarettes    Smokeless tobacco: Never   Substance and Sexual Activity    Alcohol use: Never    Drug use: Never    Sexual activity: Not Currently   Social History Narrative    ** Merged History Encounter **            Past medical history:     Past Medical History:   Diagnosis Date    Kidney stone     passed 2 weeks ago       Past Surgical history:     Past Surgical History:   Procedure Laterality Date    CAROTID STENT         Family history:     No family history on file.    Allergy history:    Review of patient's allergies indicates:  No Known Allergies    Objective:    Vital signs:  Vitals:    03/17/25 0302 03/17/25 0342 03/17/25 0801 03/17/25 1055   BP:  124/74 (!) 152/72 125/81   BP Location:       Patient Position:       Pulse:   104 88   Resp: 18  18 18   Temp:  98.2 °F (36.8 °C) 98.3 °F (36.8 °C) 98.8 °F (37.1 °C)   TempSrc:  Oral Oral Oral   SpO2:   95% 95%   Weight:        Height:         Temp (24hrs), Av.4 °F (36.9 °C), Min:98.2 °F (36.8 °C), Max:98.8 °F (37.1 °C)      Physical examination:  GEN: Awake, resting comfortably  EYES: EOMI, no scleral icterus  HENT: MMM. No oral lesions. Fair dentition.  NECK: Supple, no cervical lymphadenopathy or meningismus.   CARDIO: RRR, no murmur.  PULM/CHEST: CTAB. No increased work of breathing  ABD: Normal bowel sounds. Soft, not tender or distended. No HSM appreciated.  MSK: no obvious effusion, swelling, increased warmth, or erythema of major joints. No pedal edema.  SKIN: No rashes. No stigmata of endocarditis.  NEURO: AOx3. Speech fluent. Face symmetric.  PSYCH: Mood appropriate    Diagnostic data:     CBC  Recent Labs   Lab 03/14/25  1955 03/15/25  0441 03/16/25  0533 03/17/25  0756   WBC 25.52* 26.82  26.82* 25.01* 21.70*   HGB 14.2 14.3 14.8 14.8   * 516* 518* 608*       BMP  Recent Labs   Lab 03/14/25  1955 03/15/25  0441 03/16/25  0533 03/17/25  0948    138 136  --    K 4.1 4.0 4.4  --     102 102  --    CO2 24 23 22  --    BUN 18.2 15.6 13.9  --    CREATININE 0.78 0.68* 0.63* 0.68*   CALCIUM 9.3 9.0 8.8  --    MG  --  1.80  --   --        No results found for this or any previous visit.    Recent Labs   Lab 03/15/25  0441 03/16/25  0533 03/17/25  0756   WBC 26.82  26.82* 25.01* 21.70*   HGB 14.3 14.8 14.8   HCT 42.8 44.6 44.9   * 518* 608*     Estimated Creatinine Clearance: 113.2 mL/min (A) (based on SCr of 0.68 mg/dL (L)).    Lab Results   Component Value Date    CREATININE 0.68 (L) 2025    CREATININE 0.63 (L) 2025    CREATININE 0.68 (L) 03/15/2025    ALKPHOS 123 03/15/2025    ALKPHOS 123 2025    ALKPHOS 125 2024         Microbiology and ID tests:     Microbiology Results (last 7 days)       Procedure Component Value Units Date/Time    Wound Culture [[id number]]  (Abnormal)  (Susceptibility) Collected: 03/15/25 0144    Order Status: Completed Specimen: Abscess from Shoulder,  Left Updated: 03/17/25 0655     Wound Culture Many Methicillin Sensitive Staphylococcus aureus              Medications   Inpatient  Scheduled Meds:   aspirin  81 mg Oral Daily    enoxparin  40 mg Subcutaneous Q24H (prophylaxis, 1700)    metoprolol tartrate  12.5 mg Oral Daily    nicotine  1 patch Transdermal Daily    vancomycin 1,250 mg in D5W 250 mL IVPB (admixture device)  1,250 mg Intravenous Q8H     Continuous Infusions:  PRN Meds:.  Current Facility-Administered Medications:     acetaminophen, 1,000 mg, Oral, Q6H PRN    aluminum-magnesium hydroxide-simethicone, 30 mL, Oral, QID PRN    bisacodyL, 10 mg, Rectal, Daily PRN    dextrose 50%, 12.5 g, Intravenous, PRN    dextrose 50%, 25 g, Intravenous, PRN    glucagon (human recombinant), 1 mg, Intramuscular, PRN    glucose, 16 g, Oral, PRN    glucose, 24 g, Oral, PRN    hydrOXYzine pamoate, 50 mg, Oral, Q6H PRN    melatonin, 6 mg, Oral, Nightly PRN    naloxone, 0.02 mg, Intravenous, PRN    ondansetron, 4 mg, Intravenous, Q4H PRN    oxyCODONE, 5 mg, Oral, Q8H PRN    prochlorperazine, 5 mg, Intravenous, Q6H PRN    senna-docusate 8.6-50 mg, 1 tablet, Oral, BID PRN    sodium chloride 0.9%, 10 mL, Intravenous, PRN    vancomycin - pharmacy to dose, , Intravenous, pharmacy to manage frequency    Home Meds  Current Outpatient Medications   Medication Instructions    aspirin (ECOTRIN) 81 mg, Daily    diclofenac (VOLTAREN) 50 mg, Oral, 3 times daily PRN    fenofibrate 160 mg, Oral, Daily    gabapentin (NEURONTIN) 300 mg, Oral, 3 times daily    HYDROcodone-acetaminophen (NORCO) 7.5-325 mg per tablet 1 tablet, Oral, Every 6 hours PRN    LIDOcaine (LIDODERM) 5 % 1 patch, Transdermal, Daily, Remove & Discard patch within 12 hours or as directed by MD    metoprolol tartrate (LOPRESSOR) 12.5 mg, Oral, Daily    predniSONE (DELTASONE) 20 mg, Oral, Daily    simvastatin (ZOCOR) 40 mg, Oral, Nightly       Diagnostic studies:      No orders to display         3/17/2025 11:15 AM      The  patient location is: [facility]  Total time spent with patient: 75    The attending portion of this evaluation, treatment, and documentation was performed per Diana Fraire MD via Telemedicine AudioVisual using the secure [employer] software platform with 2 way audio/video. The provider was located off-site and the patient is located in the hospital. The aforementioned video software was utilized to document the relevant history and physical exam.     Critical care time spent: >75 minutes

## 2025-03-17 NOTE — PLAN OF CARE
03/17/25 1508   Discharge Assessment   Assessment Type Discharge Planning Assessment   Confirmed/corrected address, phone number and insurance Yes   Confirmed Demographics Correct on Facesheet   Source of Information patient   Communicated WON with patient/caregiver Date not available/Unable to determine   Reason For Admission abcess of bursa of right shoulder   People in Home spouse;child(joe), adult   Do you expect to return to your current living situation? Yes   Do you have help at home or someone to help you manage your care at home? Yes   Prior to hospitilization cognitive status: Alert/Oriented   Current cognitive status: Alert/Oriented   Walking or Climbing Stairs Difficulty no   Dressing/Bathing Difficulty no   Home Accessibility wheelchair accessible   Home Layout Able to live on 1st floor   Equipment Currently Used at Home none   Readmission within 30 days? No   Patient currently being followed by outpatient case management? No   Do you currently have service(s) that help you manage your care at home? No   Do you take prescription medications? Yes   Do you have prescription coverage? No   Do you have any problems affording any of your prescribed medications? TBD   Is the patient taking medications as prescribed? yes   Who is going to help you get home at discharge? family   How do you get to doctors appointments? car, drives self   Are you on dialysis? No   Do you take coumadin? No   Discharge Plan A Home   Discharge Plan B Home with family   DME Needed Upon Discharge  none   Discharge Plan discussed with: Patient   Transition of Care Barriers Unisured

## 2025-03-17 NOTE — TELEPHONE ENCOUNTER
Copied from CRM #[id number]. Topic: Medications - Medication Refill  >> Mar 13, 2025  8:56 AM Riana wrote:  Who Called: wife claritza in regards to Kimani Redd    Refill or New Rx:Refill  RX Name and Strength:HYDROcodone-acetaminophen (NORCO) 7.5-325 mg per tablet How is the patient currently taking it? (ex. 1XDay):1 tab every 4 hours  Is this a 30 day or 90 day RX:12 tablet      RX Name and Strength:gabapentin (NEURONTIN) 300 MG capsule   How is the patient currently taking it? (ex. 1XDay): 1 caps 3 xday  Is this a 30 day or 90 day RX:30 capsule    Local or Mail Order:local  List of preferred pharmacies on file (remove unneeded): [email or web address]  If different Pharmacy is requested, enter Pharmacy information here including location and phone number: [facility] #[id number] at [address]   Phone: [phone]  Fax: [phone]         Ordering Provider:benny      Preferred Method of Contact: Phone Call  Patient's Preferred Phone Number on File: [phone]   Best Call Back Number, if different:  Additional Information: refill request, please advise, thanks

## 2025-03-17 NOTE — PROGRESS NOTES
Pharmacokinetic Assessment Follow Up: IV Vancomycin    Vancomycin serum concentration assessment(s):    The trough level was drawn correctly and can be used to guide therapy at this time. The measurement is below the desired definitive target range of 15 to 20 mcg/mL.    Vancomycin Regimen Plan:    Change regimen to Vancomycin 1250 mg IV every 8 hours with next serum trough concentration measured at 1100 prior to 4th dose on 03/18    Drug levels (last 3 results):  Recent Labs   Lab Result Units 03/16/25 0533 03/17/25  0948   Vancomycin Trough ug/ml 2.2* 11.7*       Pharmacy will continue to follow and monitor vancomycin.    Please contact pharmacy at extension [phone] for questions regarding this assessment.    Thank you for the consult,   Chivo Ayala       Patient brief summary:  Kimani Redd is a 59 y.o. male initiated on antimicrobial therapy with IV Vancomycin for treatment of bacteremia    The patient's current regimen is 1000mg q8h.    Drug Allergies:   Review of patient's allergies indicates:  No Known Allergies    Actual Body Weight:   74.8kg    Renal Function:   Estimated Creatinine Clearance: 113.2 mL/min (A) (based on SCr of 0.68 mg/dL (L)).,     Dialysis Method (if applicable):  N/A    CBC (last 72 hours):  Recent Labs   Lab Result Units 03/14/25  1955 03/15/25  0441 03/16/25  0533 03/17/25  0756   WBC x10(3)/mcL 25.52* 26.82  26.82* 25.01* 21.70*   Hgb g/dL 14.2 14.3 14.8 14.8   Hct % 42.7 42.8 44.6 44.9   Platelet x10(3)/mcL 492* 516* 518* 608*   Mono % %  --   --   --  6.5   Monocytes % % 12* 12  --   --    Eos % %  --   --   --  0.7   Basophil % %  --   --   --  0.2       Metabolic Panel (last 72 hours):  Recent Labs   Lab Result Units 03/14/25  1955 03/15/25  0441 03/16/25  0533 03/17/25  0948   Sodium mmol/L 140 138 136  --    Potassium mmol/L 4.1 4.0 4.4  --    Chloride mmol/L 105 102 102  --    CO2 mmol/L 24 23 22  --    Glucose mg/dL 125* 112* 101*  --    Blood Urea Nitrogen mg/dL 18.2 15.6 13.9   --    Creatinine mg/dL 0.78 0.68* 0.63* 0.68*   Albumin g/dL 2.6* 2.7*  --   --    Bilirubin Total mg/dL 0.2 0.4  --   --    ALP unit/L 123 123  --   --    AST unit/L 10 7  --   --    ALT unit/L 17 12  --   --    Magnesium Level mg/dL  --  1.80  --   --        Vancomycin Administrations:  vancomycin given in the last 96 hours                     vancomycin (VANCOCIN) 1,000 mg in D5W 250 mL IVPB (admixture device) (mg) 1,000 mg New Bag 03/17/25 0305     1,000 mg New Bag 03/16/25 2036    vancomycin 1,500 mg in D5W 250 mL IVPB (admixture device) (mg) 1,500 mg New Bag 03/16/25 1224    vancomycin 1,250 mg in 0.9% NaCl 250 mL IVPB (admixture device) (mg) 1,250 mg New Bag 03/15/25 0824    vancomycin 1,500 mg in 0.9% NaCl 250 mL IVPB (admixture device) (mg) 1,500 mg New Bag 03/14/25 2020                    Microbiologic Results:  Microbiology Results (last 7 days)       Procedure Component Value Units Date/Time    Wound Culture [[id number]]  (Abnormal)  (Susceptibility) Collected: 03/15/25 0144    Order Status: Completed Specimen: Abscess from Shoulder, Left Updated: 03/17/25 0655     Wound Culture Many Methicillin Sensitive Staphylococcus aureus

## 2025-03-17 NOTE — PROGRESS NOTES
[facility]  Hospital Medicine Progress Note        Chief Complaint: Inpatient Follow-up for left shoulder septic joint    HPI per admitting team: Kimani Redd is a 59 y.o. male who  has a past medical history of Kidney stone.. The patient presented to [facility] on 3/15/2025 with a primary complaint of left shoulder pain.  Patient arrived from an outlying facility for concern of septic arthritis and abscess over the left shoulder.  Patient has not had no recent injury to his left shoulder or prior surgery.  Patient noted a bump that got worsened along with redness and warmth.  Status post I&D done in the ER.  Patient notes improvement in his shoulder pain and symptoms after I&D.  Labs were significant for leukocytosis along with the elevated inflammatory markers.  Patient was admitted to us for further workup     Interval Hx:   Patient is sitting in bed, status post I&D.  Reports he wants to go home.  Informed patient that his cultures need to be negative prior to going home.  Also discussed that there is a possibility he needs to stay in-house for IV antibiotics    Reported his sister is an RN and can help him with home antibiotics.    No family at bedside  Case was discussed with patient's nurse and  on the floor.   informed me patient is self-pay, unsure if home IV antibiotics can be arranged    Objective/physical exam:  General: In no acute distress, afebrile  Chest: Clear to auscultation bilaterally  Heart:  Near tachycardic rate and rhythm, +S1, S2, no appreciable murmur  Abdomen: Soft, nontender, BS +  MSK:  Dressing left shoulder, limited range of motion  Neurologic: Alert and oriented x4, Cranial nerve II-XII intact    VITAL SIGNS: 24 HRS MIN & MAX LAST   Temp  Min: 98.2 °F (36.8 °C)  Max: 98.8 °F (37.1 °C) 98.8 °F (37.1 °C)   BP  Min: 124/74  Max: 152/72 125/81   Pulse  Min: 88  Max: 104  88   Resp  Min: 18  Max: 20 20   SpO2  Min: 95 %  Max: 96 % 95 %     I  have reviewed the following labs:  Recent Labs   Lab 03/15/25  0441 03/16/25  0533 03/17/25  0756   WBC 26.82  26.82* 25.01* 21.70*   RBC 4.82 4.99 5.04   HGB 14.3 14.8 14.8   HCT 42.8 44.6 44.9   MCV 88.8 89.4 89.1   MCH 29.7 29.7 29.4   MCHC 33.4 33.2 33.0   RDW 12.6 12.5 12.4   * 518* 608*   MPV 10.4 10.6* 10.5*     Recent Labs   Lab 03/14/25  1955 03/15/25  0441 03/16/25  0533 03/17/25  0948    138 136  --    K 4.1 4.0 4.4  --     102 102  --    CO2 24 23 22  --    BUN 18.2 15.6 13.9  --    CREATININE 0.78 0.68* 0.63* 0.68*   CALCIUM 9.3 9.0 8.8  --    MG  --  1.80  --   --    ALBUMIN 2.6* 2.7*  --   --    ALKPHOS 123 123  --   --    ALT 17 12  --   --    AST 10 7  --   --    BILITOT 0.2 0.4  --   --      Microbiology Results (last 7 days)       Procedure Component Value Units Date/Time    Blood Culture [[id number]]     Order Status: Sent Specimen: Blood     Blood Culture [[id number]]     Order Status: Sent Specimen: Blood     Wound Culture [[id number]]  (Abnormal)  (Susceptibility) Collected: 03/15/25 0144    Order Status: Completed Specimen: Abscess from Shoulder, Left Updated: 03/17/25 0655     Wound Culture Many Methicillin Sensitive Staphylococcus aureus             See below for Radiology    Intake/Output:  Intake/Output Summary (Last 24 hours) at 3/17/2025 1430  Last data filed at 3/17/2025 1402  Gross per 24 hour   Intake 880 ml   Output --   Net 880 ml          Assessment/Plan:  Staph bacteremia likely secondary to left shoulder abscess  Left shoulder abscess status post I and D - 03/15--> MSSA  Elevated inflammatory markers, secondary to above   Leukocytosis, secondary to above   Known hypertension  Tobacco use       Ortho on board, appreciate recs, continue IV antibiotics, No indication for operative intervention for septic arthritis of the left shoulder.   3/15- left shoulder abscess culture -MSSA  3/17- repeat bedside I and D per orthopedic  2/2 blood cultures positive for staph-  identification and susceptibility pending  3/17- Repeat Blood cx x 2- in process   ID consulted, theresa recs  Received vancomycin x 3 days, switched to cefazolin 2 g IV Q 8 per ID-day 3- duration of treatment to be determined   MRSA PCR negative  Zosyn discontinued 3/16  Leukocytosis persists, 21 K  Echo- mildly reduced EF of 45%.  There is diastolic dysfunction.  Right ventricular systolic function is normal.  Mild aortic stenosis, trace aortic regurg.  No obvious evidence of valvular vegetations  Tachycardia noted, increase metoprolol to 25 mg p.o. b.i.d. (patient was taking 12.5 mg daily), continue home aspirin 81 mg daily, fenofibrate 145 mg daily  Nicotine patch 14 mg daily  Appropriate home medication for chronic medical condition has been resumed  Morning      VTE prophylaxis:  Lovenox    Patient condition:  Fair    Anticipated discharge and Disposition:  TBD      All diagnosis and differential diagnosis have been reviewed; assessment and plan has been documented; I have personally reviewed the labs and test results that are presently available; I have reviewed the patients medication list; I have reviewed the consulting providers response and recommendations. I have reviewed or attempted to review medical records based upon their availability    All of the patient's questions have been  addressed and answered. Patient's is agreeable to the above stated plan. I will continue to monitor closely and make adjustments to medical management as needed.    Portions of this note dictated using EMR integrated voice recognition software, and may be subject to voice recognition errors not corrected at proofreading. Please contact writer for clarification if needed.   _____________________________________________________________________    Malnutrition Status:  Nutrition consulted. Most recent weight and BMI monitored-     Measurements:  Wt Readings from Last 1 Encounters:   03/15/25 74.8 kg (165 lb)   Body mass  index is 27.46 kg/m².    Patient has been screened and assessed by RD.    Malnutrition Type:  Context:    Level:      Malnutrition Characteristic Summary:       Interventions/Recommendations (treatment strategy):        Scheduled Med:   aspirin  81 mg Oral Daily    ceFAZolin (Ancef) IV (PEDS and ADULTS)  2 g Intravenous Q8H    enoxparin  40 mg Subcutaneous Q24H (prophylaxis, 1700)    fenofibrate  145 mg Oral Daily    metoprolol tartrate  25 mg Oral BID    nicotine  1 patch Transdermal Daily      Continuous Infusions:     PRN Meds:  Current Facility-Administered Medications:     acetaminophen, 1,000 mg, Oral, Q6H PRN    aluminum-magnesium hydroxide-simethicone, 30 mL, Oral, QID PRN    bisacodyL, 10 mg, Rectal, Daily PRN    dextrose 50%, 12.5 g, Intravenous, PRN    dextrose 50%, 25 g, Intravenous, PRN    glucagon (human recombinant), 1 mg, Intramuscular, PRN    glucose, 16 g, Oral, PRN    glucose, 24 g, Oral, PRN    hydrOXYzine pamoate, 50 mg, Oral, Q6H PRN    melatonin, 6 mg, Oral, Nightly PRN    naloxone, 0.02 mg, Intravenous, PRN    ondansetron, 4 mg, Intravenous, Q4H PRN    oxyCODONE, 5 mg, Oral, Q8H PRN    prochlorperazine, 5 mg, Intravenous, Q6H PRN    senna-docusate 8.6-50 mg, 1 tablet, Oral, BID PRN    sodium chloride 0.9%, 10 mL, Intravenous, PRN     Radiology:  I have personally reviewed the following imaging and agree with the radiologist.     Echo    Left Ventricle: The left ventricle is normal in size. There is   concentric remodeling. Regional wall motion abnormalities present.   Inferior and inferior lateral walls are hypokinetic. There is mildly   reduced systolic function with a visually estimated ejection fraction of   45%. There is diastolic dysfunction.    Right Ventricle: The right ventricle is normal in size. Systolic   function is normal.    Aortic Valve: The aortic valve is a trileaflet valve. Moderately   calcified cusps. Mildly restricted motion. There is mild stenosis. Aortic   valve area by  VTI is 1.4 cm². Aortic valve peak velocity is 2.5 m/s. Mean   gradient is 16 mmHg. The dimensionless index is 0.41. There is trace   aortic regurgitation.    Mitral Valve: There is no significant regurgitation.    Tricuspid Valve: There is no significant regurgitation.    Pericardium: There is no pericardial effusion.    No obvious evidence of valvular vegetations.    Can consider SHRUTHI to further evaluate for infective endocarditis if   clinically indicated.      Carlos Murcia MD  Department of Hospital Medicine   [facility]   03/17/2025

## 2025-03-18 LAB
BACTERIA BLD CULT: ABNORMAL
BACTERIA BLD CULT: ABNORMAL
BASOPHILS # BLD AUTO: 0.05 X10(3)/MCL
BASOPHILS NFR BLD AUTO: 0.3 %
EOSINOPHIL # BLD AUTO: 0.24 X10(3)/MCL (ref 0–0.9)
EOSINOPHIL NFR BLD AUTO: 1.5 %
ERYTHROCYTE [DISTWIDTH] IN BLOOD BY AUTOMATED COUNT: 12.3 % (ref 11.5–17)
GRAM STN SPEC: ABNORMAL
HCT VFR BLD AUTO: 45 % (ref 42–52)
HGB BLD-MCNC: 15.3 G/DL (ref 14–18)
IMM GRANULOCYTES # BLD AUTO: 0.22 X10(3)/MCL (ref 0–0.04)
IMM GRANULOCYTES NFR BLD AUTO: 1.4 %
LYMPHOCYTES # BLD AUTO: 2.86 X10(3)/MCL (ref 0.6–4.6)
LYMPHOCYTES NFR BLD AUTO: 18.3 %
MCH RBC QN AUTO: 29.7 PG (ref 27–31)
MCHC RBC AUTO-ENTMCNC: 34 G/DL (ref 33–36)
MCV RBC AUTO: 87.4 FL (ref 80–94)
MONOCYTES # BLD AUTO: 1.76 X10(3)/MCL (ref 0.1–1.3)
MONOCYTES NFR BLD AUTO: 11.3 %
NEUTROPHILS # BLD AUTO: 10.47 X10(3)/MCL (ref 2.1–9.2)
NEUTROPHILS NFR BLD AUTO: 67.2 %
NRBC BLD AUTO-RTO: 0 %
OHS QRS DURATION: 100 MS
OHS QTC CALCULATION: 442 MS
PLATELET # BLD AUTO: 648 X10(3)/MCL (ref 130–400)
PMV BLD AUTO: 10.5 FL (ref 7.4–10.4)
RBC # BLD AUTO: 5.15 X10(6)/MCL (ref 4.7–6.1)
WBC # BLD AUTO: 15.6 X10(3)/MCL (ref 4.5–11.5)

## 2025-03-18 PROCEDURE — 21400001 HC TELEMETRY ROOM

## 2025-03-18 PROCEDURE — 85025 COMPLETE CBC W/AUTO DIFF WBC: CPT | Performed by: INTERNAL MEDICINE

## 2025-03-18 PROCEDURE — 25000003 PHARM REV CODE 250: Performed by: NURSE PRACTITIONER

## 2025-03-18 PROCEDURE — 25000003 PHARM REV CODE 250: Performed by: STUDENT IN AN ORGANIZED HEALTH CARE EDUCATION/TRAINING PROGRAM

## 2025-03-18 PROCEDURE — 63600175 PHARM REV CODE 636 W HCPCS: Performed by: STUDENT IN AN ORGANIZED HEALTH CARE EDUCATION/TRAINING PROGRAM

## 2025-03-18 PROCEDURE — 25000003 PHARM REV CODE 250: Performed by: INTERNAL MEDICINE

## 2025-03-18 PROCEDURE — S4991 NICOTINE PATCH NONLEGEND: HCPCS | Performed by: STUDENT IN AN ORGANIZED HEALTH CARE EDUCATION/TRAINING PROGRAM

## 2025-03-18 PROCEDURE — 36415 COLL VENOUS BLD VENIPUNCTURE: CPT | Performed by: INTERNAL MEDICINE

## 2025-03-18 PROCEDURE — 93005 ELECTROCARDIOGRAM TRACING: CPT

## 2025-03-18 PROCEDURE — 63600175 PHARM REV CODE 636 W HCPCS: Performed by: INTERNAL MEDICINE

## 2025-03-18 RX ORDER — METOPROLOL SUCCINATE 25 MG/1
25 TABLET, EXTENDED RELEASE ORAL DAILY
Status: DISCONTINUED | OUTPATIENT
Start: 2025-03-19 | End: 2025-04-03 | Stop reason: HOSPADM

## 2025-03-18 RX ORDER — MORPHINE SULFATE 4 MG/ML
2 INJECTION, SOLUTION INTRAMUSCULAR; INTRAVENOUS ONCE
Status: COMPLETED | OUTPATIENT
Start: 2025-03-18 | End: 2025-03-18

## 2025-03-18 RX ADMIN — ENOXAPARIN SODIUM 40 MG: 40 INJECTION SUBCUTANEOUS at 04:03

## 2025-03-18 RX ADMIN — CEFAZOLIN SODIUM 2 G: 2 SOLUTION INTRAVENOUS at 06:03

## 2025-03-18 RX ADMIN — FENOFIBRATE 145 MG: 145 TABLET, FILM COATED ORAL at 07:03

## 2025-03-18 RX ADMIN — HYDROXYZINE PAMOATE 50 MG: 50 CAPSULE ORAL at 03:03

## 2025-03-18 RX ADMIN — MORPHINE SULFATE 2 MG: 4 INJECTION INTRAVENOUS at 02:03

## 2025-03-18 RX ADMIN — NALOXEGOL OXALATE 25 MG: 25 TABLET, FILM COATED ORAL at 02:03

## 2025-03-18 RX ADMIN — ASPIRIN 81 MG: 81 TABLET, COATED ORAL at 07:03

## 2025-03-18 RX ADMIN — CEFAZOLIN SODIUM 2 G: 2 SOLUTION INTRAVENOUS at 08:03

## 2025-03-18 RX ADMIN — OXYCODONE HYDROCHLORIDE 5 MG: 5 TABLET ORAL at 04:03

## 2025-03-18 RX ADMIN — OXYCODONE HYDROCHLORIDE 5 MG: 5 TABLET ORAL at 08:03

## 2025-03-18 RX ADMIN — NICOTINE 1 PATCH: 14 PATCH TRANSDERMAL at 07:03

## 2025-03-18 RX ADMIN — Medication 6 MG: at 08:03

## 2025-03-18 RX ADMIN — METOPROLOL TARTRATE 25 MG: 25 TABLET, FILM COATED ORAL at 07:03

## 2025-03-18 RX ADMIN — OXYCODONE HYDROCHLORIDE 5 MG: 5 TABLET ORAL at 03:03

## 2025-03-18 RX ADMIN — OXYCODONE HYDROCHLORIDE 5 MG: 5 TABLET ORAL at 07:03

## 2025-03-18 RX ADMIN — CEFAZOLIN SODIUM 2 G: 2 SOLUTION INTRAVENOUS at 12:03

## 2025-03-18 RX ADMIN — OXYCODONE HYDROCHLORIDE 5 MG: 5 TABLET ORAL at 11:03

## 2025-03-18 RX ADMIN — HYDROXYZINE PAMOATE 50 MG: 50 CAPSULE ORAL at 09:03

## 2025-03-18 RX ADMIN — HYDROXYZINE PAMOATE 50 MG: 50 CAPSULE ORAL at 04:03

## 2025-03-18 NOTE — PROGRESS NOTES
"Patient Name: Kimani Redd  MRN: [id number]  Admission Date: 3/15/2025  Hospital Length of Stay: 3 days  Attending Provider: Pb Fisher MD  Primary Care Provider: Jessi Leyva FNP    Subjective:     Principal Orthopedic Problem:  Left shoulder superficial abscess    Interval History:  Seen this morning. Still with pain but improved. Drainage noted. WBC count improving. Will need plan for abx on DC    Review of patient's allergies indicates:  No Known Allergies    Current Facility-Administered Medications   Medication    acetaminophen tablet 1,000 mg    aluminum-magnesium hydroxide-simethicone 200-200-20 mg/5 mL suspension 30 mL    aspirin EC tablet 81 mg    bisacodyL suppository 10 mg    cefazolin (ANCEF) 2 gram in dextrose 5% 50 mL IVPB (premix)    dextrose 50% injection 12.5 g    dextrose 50% injection 25 g    enoxaparin injection 40 mg    fenofibrate tablet 145 mg    glucagon (human recombinant) injection 1 mg    glucose chewable tablet 16 g    glucose chewable tablet 24 g    hydrOXYzine pamoate capsule 50 mg    melatonin tablet 6 mg    metoprolol tartrate (LOPRESSOR) tablet 25 mg    naloxone 0.4 mg/mL injection 0.02 mg    nicotine 14 mg/24 hr 1 patch    ondansetron injection 4 mg    oxyCODONE immediate release tablet 5 mg    prochlorperazine injection Soln 5 mg    senna-docusate 8.6-50 mg per tablet 1 tablet    sodium chloride 0.9% flush 10 mL     Objective:     Vital Signs (Most Recent):  Temp: 98.3 °F (36.8 °C) (03/18/25 0734)  Pulse: 81 (03/18/25 0946)  Resp: 20 (03/18/25 0946)  BP: 118/73 (03/18/25 0946)  SpO2: 99 % (03/18/25 0946) Vital Signs (24h Range):  Temp:  [98.3 °F (36.8 °C)-98.8 °F (37.1 °C)] 98.3 °F (36.8 °C)  Pulse:  [] 81  Resp:  [16-20] 20  SpO2:  [94 %-99 %] 99 %  BP: ()/(61-81) 118/73     Weight: 74.8 kg (165 lb)  Height: 5' 5" (165.1 cm)  Body mass index is 27.46 kg/m².      Intake/Output Summary (Last 24 hours) at 3/18/2025 1018  Last data filed at 3/17/2025 " 2228  Gross per 24 hour   Intake 760 ml   Output --   Net 760 ml       Physical Exam:   Ortho/SPM Exam    General the patient is alert and oriented x3 no acute distress nontoxic-appearing appropriate affect.    Constitutional: Vital signs are examined and stable.  Resp: No signs of labored breathing    Musculoskeletal Exam:  Left shoulder: purulence from wound- dressing in tact.  Localized erythema noted.  Again no evidence of deep infection, no shoulder joint involvement noted.  Able to perform active range motion    Diagnostic Findings:   Significant Labs: BMP:   Recent Labs   Lab 03/17/25  0948   CREATININE 0.68*     CBC:   Recent Labs   Lab 03/17/25  0756 03/18/25  0831   WBC 21.70* 15.60*   HGB 14.8 15.3   HCT 44.9 45.0   * 648*     CMP:   Recent Labs   Lab 03/17/25  0948   CREATININE 0.68*     All pertinent labs within the past 24 hours have been reviewed.        Significant Imaging: I have reviewed and interpreted all pertinent imaging results/findings.     Assessment/Plan:       abscess was lanced at bedside yesterday, packing was placed wound care consult for continued management of shoulder wound   His white count down trending.    He is on IV antibiotics.  Bacteremia noted  No indication for operative intervention for septic arthritis of the shoulder.    Continue IV antibiotics per primary team.    wound care consultation for daily dressing changes instructions for packing the wound.   Call with questions or concerns.    Jodi Dennis FNP-C  Orthopedic Trauma Surgery  [facility] - 5th Floor Med Surg     Continue Regimen: Triamcinolone 0.1% BID can use up to 14 days a month; clobetasol on scalp (prescribed elsewhere) Initiate Treatment: Tacrolimus 0.1% BID on face Render In Strict Bullet Format?: Yes Detail Level: Zone Plan: Return to clinic in 3-4 weeks for a follow up.

## 2025-03-18 NOTE — CONSULTS
Inpatient consult to Cardiology  Consult performed by: Hanna Singh FNP  Consult ordered by: iDana Fraire MD        [facility]    Cardiology  Consult Note    Patient Name: Kimani Redd  MRN: [id number]  Admission Date: 3/15/2025  Hospital Length of Stay: 3 days  Code Status: Full Code   Attending Provider: Pb Fisher MD   Consulting Provider: KAYA King  Primary Care Physician: Jessi Leyva FNP  Principal Problem:<principal problem not specified>    Patient information was obtained from patient, past medical records, and ER records.     Subjective:   Chief Complaint/Reason for Consult: SHRUTHI    HPI:   Mr. Redd is a 60 y/o male, unknown to [facility], who presented to ED 3.15.25 with c/o left shoulder pain and was found to have an abscess on his left shoulder. He underwent an I&D in ED and was started on ABX and admitted for further workup. Blood cultures + MSSA. Echo has not evidence of valvular vegetations. Orthopedic surgery has no plans for operative intervention-- no suspicion for septic arthritis of shoulder and no indication for deep debridement or arthrotomy.  ID consulted and recommending SHRUTHI;  therefore [facility] has been consulted for this reason  **CT shoulder negative for acute or destructive osseous process  ** of note, patient reports he had an MI 7 years prior and underwent LHC with placement of 2 stents. He has not had follow-up with cardiologist since then. He was unsure if he has a hx of HF. He denies CP or SOB.       PMH: kidney stone, HTN, CAD  PSH: LHC/stent x2  Family History: unknown  Social History: smoker      Previous Cardiac Diagnostics:   TTE 03.16.25:  Left Ventricle: The left ventricle is normal in size. There is concentric remodeling. Regional wall motion abnormalities present. Inferior and inferior lateral walls are hypokinetic. There is mildly reduced systolic function with a visually estimated ejection fraction of 45%.  There is diastolic dysfunction.    Right Ventricle: The right ventricle is normal in size. Systolic function is normal.    Aortic Valve: The aortic valve is a trileaflet valve. Moderately calcified cusps. Mildly restricted motion. There is mild stenosis. Aortic valve area by VTI is 1.4 cm². Aortic valve peak velocity is 2.5 m/s. Mean gradient is 16 mmHg. The dimensionless index is 0.41. There is trace aortic regurgitation.    Mitral Valve: There is no significant regurgitation.    Tricuspid Valve: There is no significant regurgitation.    Pericardium: There is no pericardial effusion.    No obvious evidence of valvular vegetations.     Can consider SHRUTHI to further evaluate for infective endocarditis if clinically indicated.    Past Medical History:   Diagnosis Date    Kidney stone     passed 2 weeks ago     Past Surgical History:   Procedure Laterality Date    CAROTID STENT       Review of patient's allergies indicates:  No Known Allergies  No current facility-administered medications on file prior to encounter.     Current Outpatient Medications on File Prior to Encounter   Medication Sig    aspirin (ECOTRIN) 81 MG EC tablet Take 81 mg by mouth once daily.    diclofenac (VOLTAREN) 50 MG EC tablet Take 1 tablet (50 mg total) by mouth 3 (three) times daily as needed (pain).    fenofibrate 160 MG Tab Take 1 tablet (160 mg total) by mouth once daily.    gabapentin (NEURONTIN) 300 MG capsule Take 1 capsule (300 mg total) by mouth 3 (three) times daily. for 10 days    HYDROcodone-acetaminophen (NORCO) 7.5-325 mg per tablet Take 1 tablet by mouth every 6 (six) hours as needed for Pain.    LIDOcaine (LIDODERM) 5 % Place 1 patch onto the skin once daily. Remove & Discard patch within 12 hours or as directed by MD    predniSONE (DELTASONE) 20 MG tablet Take 1 tablet (20 mg total) by mouth once daily.    metoprolol tartrate (LOPRESSOR) 25 MG tablet Take 0.5 tablets (12.5 mg total) by mouth once daily.    simvastatin (ZOCOR) 40  MG tablet TAKE ONE TABLET BY MOUTH EVERY EVENING     Family History    None       Tobacco Use    Smoking status: Every Day     Current packs/day: 0.50     Types: Cigarettes    Smokeless tobacco: Never   Substance and Sexual Activity    Alcohol use: Never    Drug use: Never    Sexual activity: Not Currently       Review of Systems   Constitutional: Negative.    Respiratory: Negative.     Cardiovascular: Negative.    Gastrointestinal: Negative.    Musculoskeletal:  Positive for arthralgias.   Skin:  Positive for wound.     Objective:     Vital Signs (Most Recent):  Temp: 98.4 °F (36.9 °C) (03/18/25 1045)  Pulse: 79 (03/18/25 1045)  Resp: (!) 22 (03/18/25 1154)  BP: 133/71 (03/18/25 1045)  SpO2: 97 % (03/18/25 1045) Vital Signs (24h Range):  Temp:  [98.3 °F (36.8 °C)-98.4 °F (36.9 °C)] 98.4 °F (36.9 °C)  Pulse:  [] 79  Resp:  [16-22] 22  SpO2:  [94 %-99 %] 97 %  BP: ()/(61-78) 133/71   Weight: 74.8 kg (165 lb)  Body mass index is 27.46 kg/m².  SpO2: 97 %       Intake/Output Summary (Last 24 hours) at 3/18/2025 1156  Last data filed at 3/17/2025 2228  Gross per 24 hour   Intake 760 ml   Output --   Net 760 ml     Lines/Drains/Airways       Peripheral Intravenous Line  Duration                  Peripheral IV - Single Lumen 03/18/25 0539 20 G Anterior;Right Forearm <1 day                  Significant Labs:   Chemistries:   Recent Labs   Lab 03/14/25  1955 03/15/25  0441 03/16/25  0533 03/17/25  0948    138 136  --    K 4.1 4.0 4.4  --     102 102  --    CO2 24 23 22  --    BUN 18.2 15.6 13.9  --    CREATININE 0.78 0.68* 0.63* 0.68*   CALCIUM 9.3 9.0 8.8  --    BILITOT 0.2 0.4  --   --    ALKPHOS 123 123  --   --    ALT 17 12  --   --    AST 10 7  --   --    GLUCOSE 125* 112* 101*  --    MG  --  1.80  --   --         CBC/Anemia Labs: Coags:    Recent Labs   Lab 03/16/25  0533 03/17/25  0756 03/18/25  0831   WBC 25.01* 21.70* 15.60*   HGB 14.8 14.8 15.3   HCT 44.6 44.9 45.0   * 608* 648*   MCV  "89.4 89.1 87.4   RDW 12.5 12.4 12.3    No results for input(s): "PT", "INR", "APTT" in the last 168 hours.     Significant Imaging:  Imaging Results    None       EKG:   None on file      Physical Exam  Cardiovascular:      Rate and Rhythm: Normal rate and regular rhythm.      Pulses: Normal pulses.      Heart sounds: Normal heart sounds.   Pulmonary:      Effort: Pulmonary effort is normal.      Breath sounds: Normal breath sounds.   Abdominal:      Palpations: Abdomen is soft.   Musculoskeletal:         General: Normal range of motion.   Skin:     General: Skin is warm.      Capillary Refill: Capillary refill takes less than 2 seconds.   Neurological:      General: No focal deficit present.      Mental Status: He is alert.   Psychiatric:         Mood and Affect: Mood normal.       Home Medications:   Medications Ordered Prior to Encounter[1]  Current Schedule Inpatient Medications:   aspirin  81 mg Oral Daily    ceFAZolin (Ancef) IV (PEDS and ADULTS)  2 g Intravenous Q8H    enoxparin  40 mg Subcutaneous Q24H (prophylaxis, 1700)    fenofibrate  145 mg Oral Daily    metoprolol tartrate  25 mg Oral BID    nicotine  1 patch Transdermal Daily     Continuous Infusions:    Assessment:   Bacteremia  --blood cultures/wound cultures + MSSA  --leukocytosis improving  Left shoulder abscess  --s/p I&D  CMO, unspecified  --TTE 03.16.25: EF 45% with RWMA  --reported Hx CAD with 2 stents placed 2018  HTN  Tobacco dependence    Plan:   Plan SHRUTHI on Thursday. NPO after MN Thursday  R/B/A discussed with patient and he is amenble to proceeding with procedure. Consent obtained and placed on chart.   No need for inpatient workup of CMO. Asymptomatic.   Will initiate HF- GDMT. DC Metoprolol tartrate. Start Toprol 25 mg daily. Hold for sbp < 100 or HR <60. Add ACEi/ARB/ARNI in am if BP remains stable.            Thank you for your consult.     Hanna Singh, KAYA  Cardiology  [facility]          [1]   No current " facility-administered medications on file prior to encounter.     Current Outpatient Medications on File Prior to Encounter   Medication Sig Dispense Refill    aspirin (ECOTRIN) 81 MG EC tablet Take 81 mg by mouth once daily.      diclofenac (VOLTAREN) 50 MG EC tablet Take 1 tablet (50 mg total) by mouth 3 (three) times daily as needed (pain). 30 tablet 0    fenofibrate 160 MG Tab Take 1 tablet (160 mg total) by mouth once daily. 90 tablet 1    gabapentin (NEURONTIN) 300 MG capsule Take 1 capsule (300 mg total) by mouth 3 (three) times daily. for 10 days 30 capsule 0    HYDROcodone-acetaminophen (NORCO) 7.5-325 mg per tablet Take 1 tablet by mouth every 6 (six) hours as needed for Pain. 12 tablet 0    LIDOcaine (LIDODERM) 5 % Place 1 patch onto the skin once daily. Remove & Discard patch within 12 hours or as directed by MD 30 patch 0    predniSONE (DELTASONE) 20 MG tablet Take 1 tablet (20 mg total) by mouth once daily. 5 tablet 0    metoprolol tartrate (LOPRESSOR) 25 MG tablet Take 0.5 tablets (12.5 mg total) by mouth once daily. 30 tablet 11    simvastatin (ZOCOR) 40 MG tablet TAKE ONE TABLET BY MOUTH EVERY EVENING 30 tablet 11

## 2025-03-18 NOTE — PLAN OF CARE
Problem: Adult Inpatient Plan of Care  Goal: Plan of Care Review  Outcome: Progressing  Goal: Absence of Hospital-Acquired Illness or Injury  Outcome: Progressing  Goal: Optimal Comfort and Wellbeing  Outcome: Progressing  Goal: Readiness for Transition of Care  Outcome: Progressing     Problem: Wound  Goal: Optimal Coping  Outcome: Progressing  Goal: Optimal Functional Ability  Outcome: Progressing  Goal: Absence of Infection Signs and Symptoms  Outcome: Progressing  Goal: Improved Oral Intake  Outcome: Progressing  Goal: Optimal Pain Control and Function  Outcome: Progressing  Goal: Skin Health and Integrity  Outcome: Progressing  Goal: Optimal Wound Healing  Outcome: Progressing     Problem: Infection  Goal: Absence of Infection Signs and Symptoms  Outcome: Progressing

## 2025-03-18 NOTE — CONSULTS
[facility] - 5th Floor Med Surg  Wound Care    Patient Name:  Kimani Redd   MRN:  [id number]  Date: 3/18/2025  Diagnosis: <principal problem not specified>    History:     Past Medical History:   Diagnosis Date    Kidney stone     passed 2 weeks ago       Social History[1]    Precautions:     Allergies as of 03/14/2025    (No Known Allergies)       [facility] Assessment Details/Treatment      03/18/25 1158   WO Assessment   Visit Date 03/18/25   Visit Time 1158   Consult Type New   [facility] Speciality Wound   Intervention chart review;assessed;changed;applied;orders   Teaching on-going        Wound 03/15/25 0110 Abscess Left Shoulder   Date First Assessed/Time First Assessed: 03/15/25 0110   Primary Wound Type: Abscess  Side: Left  Location: Shoulder   Wound Image     Dressing Appearance Dried drainage   Drainage Amount Moderate   Drainage Characteristics/Odor Other (see comments);No odor;Purulent  (dark red)   Appearance Red;Moist;Other (see comments)  (appearance of mutliple pustules, proximal to wound)   Tissue loss description Full thickness   Black (%), Wound Tissue Color 0 %   Red (%), Wound Tissue Color 100 %   Yellow (%), Wound Tissue Color 0 %   Periwound Area Edematous;Redness;Swelling   Wound Edges Irregular   Wound Length (cm) 1 cm   Wound Width (cm) 2.4 cm   Wound Depth (cm) 0.9 cm   Wound Volume (cm^3) 1.131 cm^3   Wound Surface Area (cm^2) 1.88 cm^2   Care Cleansed with:;Antimicrobial agent   Dressing Applied;Other (comment)  (vashe moistened 1/4 gauze. packed, covered with exudry dressing, secured with tape)   [facility] consulted for left shoulder wound. Pt had abscess lanced at bedside on 3/17/25 per orthopedic. Pt has been referred to wound care team for daily dressing changes. Introduced myself and Sarah nurse tech to patient and wife. Explained reason for visit. Removed dressing to left shoulder. Removed packing. Cleansed well with Vashe. Measurements obtained along with photos. Treatment  performed: Packed lightly with vashe moistened 1/4 in gauze, covered with exudry dressing, covered with tape. Wound care to be performed daily. NurseOdalys in room at time of dressing change. Frequent breaks taken for patient due to pain .  Discussed pain management prior to wound care. Will follow up. Orders placed.  03/18/2025         [1]   Social History  Socioeconomic History    Marital status:    Occupational History     Comment: employed   Tobacco Use    Smoking status: Every Day     Current packs/day: 0.50     Types: Cigarettes    Smokeless tobacco: Never   Substance and Sexual Activity    Alcohol use: Never    Drug use: Never    Sexual activity: Not Currently   Social History Narrative    ** Merged History Encounter **

## 2025-03-18 NOTE — PROGRESS NOTES
[facility] Hospital Medicine Progress Note        Chief Complaint: Inpatient Follow-up for left shoulder septic joint    HPI per admitting team: Kimani Redd is a 59 y.o. male who  has a past medical history of Kidney stone.. The patient presented to [facility] on 3/15/2025 with a primary complaint of left shoulder pain.  Patient arrived from an outlying facility for concern of septic arthritis and abscess over the left shoulder.  Patient has not had no recent injury to his left shoulder or prior surgery.  Patient noted a bump that got worsened along with redness and warmth.  Status post I&D done in the ER.  Patient notes improvement in his shoulder pain and symptoms after I&D.  Labs were significant for leukocytosis along with the elevated inflammatory markers.  Patient was admitted to us for further workup     Interval Hx:   Patient is sitting in bed, had wound dressing today, complaining of pain   Wife and sister at bedside, reported patient does not have insurance so going home with antibiotic will be difficult.    Sister informed me that they have discussed it with ID physician virtual visit today and he has recommended MRI of the shoulder as well as SHRUTHI  Case was discussed with patient's nurse and  on the floor.      Objective/physical exam:  General: In no acute distress, afebrile, uncomfortable with left shoulder pain  Chest: Clear to auscultation bilaterally anteriorly  Heart:  Regular rate and rhythm, +S1, S2, no appreciable murmur  Abdomen: Soft, nontender, BS +  MSK:  Dressing left shoulder, limited range of motion  Neurologic: Alert and oriented x4, Cranial nerve II-XII intact    VITAL SIGNS: 24 HRS MIN & MAX LAST   Temp  Min: 98.3 °F (36.8 °C)  Max: 98.4 °F (36.9 °C) 98.4 °F (36.9 °C)   BP  Min: 94/69  Max: 133/71 133/71   Pulse  Min: 73  Max: 100  79   Resp  Min: 16  Max: 22 16   SpO2  Min: 94 %  Max: 99 % 97 %     I have reviewed the following labs:  Recent Labs    Lab 03/16/25  0533 03/17/25  0756 03/18/25  0831   WBC 25.01* 21.70* 15.60*   RBC 4.99 5.04 5.15   HGB 14.8 14.8 15.3   HCT 44.6 44.9 45.0   MCV 89.4 89.1 87.4   MCH 29.7 29.4 29.7   MCHC 33.2 33.0 34.0   RDW 12.5 12.4 12.3   * 608* 648*   MPV 10.6* 10.5* 10.5*     Recent Labs   Lab 03/14/25  1955 03/15/25  0441 03/16/25  0533 03/17/25  0948    138 136  --    K 4.1 4.0 4.4  --     102 102  --    CO2 24 23 22  --    BUN 18.2 15.6 13.9  --    CREATININE 0.78 0.68* 0.63* 0.68*   CALCIUM 9.3 9.0 8.8  --    MG  --  1.80  --   --    ALBUMIN 2.6* 2.7*  --   --    ALKPHOS 123 123  --   --    ALT 17 12  --   --    AST 10 7  --   --    BILITOT 0.2 0.4  --   --      Microbiology Results (last 7 days)       Procedure Component Value Units Date/Time    Blood Culture [[id number]] Collected: 03/17/25 1602    Order Status: Resulted Specimen: Blood Updated: 03/17/25 1907    Blood Culture [[id number]] Collected: 03/17/25 1602    Order Status: Resulted Specimen: Blood Updated: 03/17/25 1907    Wound Culture [[id number]]  (Abnormal)  (Susceptibility) Collected: 03/15/25 0144    Order Status: Completed Specimen: Abscess from Shoulder, Left Updated: 03/17/25 0655     Wound Culture Many Methicillin Sensitive Staphylococcus aureus             See below for Radiology    Intake/Output:  Intake/Output Summary (Last 24 hours) at 3/18/2025 1456  Last data filed at 3/18/2025 1418  Gross per 24 hour   Intake 720 ml   Output --   Net 720 ml          Assessment/Plan:  MSSA bacteremia likely secondary to left shoulder abscess  Left shoulder abscess status post I and D - 03/15--> MSSA  Elevated inflammatory markers, secondary to above   Leukocytosis, secondary to above - trending down  Known hypertension  History of MI/CAD, CMO 45% with RWMA, DD  Tobacco use       ID on board, appreciate recs  Echo- mildly reduced EF of 45%.  There is diastolic dysfunction.  Right ventricular systolic function is normal.  Mild aortic stenosis,  trace aortic regurg.  No obvious evidence of valvular vegetations  ID requested SHRUTHI, cardiology on board, plan for SHRUTHI tomorrow  NPO after midnight   Ortho on board, appreciate recs, continue IV antibiotics, No indication for operative intervention for septic arthritis of the left shoulder.   3/15- left shoulder abscess culture -MSSA  3/17- repeat bedside I and D per orthopedic  2/2 blood cultures - MSSA  3/17- Repeat Blood cx x 2- in process   No PICC line till cultures are negative for 48 hours  Received vancomycin x 3 days, switched to cefazolin 2 g IV Q 8 per ID-day 4- duration of treatment to be determined   MRSA PCR negative  Zosyn discontinued 3/16  Leukocytosis improving, now down to 15 K  Tachycardia improved with metoprolol, continue home aspirin 81 mg daily, fenofibrate 145 mg daily- improved  Nicotine patch 14 mg daily  Appropriate home medication for chronic medical condition has been resumed  Morning CBC, BMP ordered       VTE prophylaxis:  Lovenox    Patient condition:  Fair    Anticipated discharge and Disposition:  TBD      All diagnosis and differential diagnosis have been reviewed; assessment and plan has been documented; I have personally reviewed the labs and test results that are presently available; I have reviewed the patients medication list; I have reviewed the consulting providers response and recommendations. I have reviewed or attempted to review medical records based upon their availability    All of the patient's questions have been  addressed and answered. Patient's is agreeable to the above stated plan. I will continue to monitor closely and make adjustments to medical management as needed.    Portions of this note dictated using EMR integrated voice recognition software, and may be subject to voice recognition errors not corrected at proofreading. Please contact writer for clarification if needed.    _____________________________________________________________________    Malnutrition Status:  Nutrition consulted. Most recent weight and BMI monitored-     Measurements:  Wt Readings from Last 1 Encounters:   03/15/25 74.8 kg (165 lb)   Body mass index is 27.46 kg/m².    Patient has been screened and assessed by RD.    Malnutrition Type:  Context:    Level:      Malnutrition Characteristic Summary:       Interventions/Recommendations (treatment strategy):        Scheduled Med:   aspirin  81 mg Oral Daily    ceFAZolin (Ancef) IV (PEDS and ADULTS)  2 g Intravenous Q8H    enoxparin  40 mg Subcutaneous Q24H (prophylaxis, 1700)    fenofibrate  145 mg Oral Daily    [START ON 3/19/2025] metoprolol succinate  25 mg Oral Daily    naloxegoL  25 mg Oral Daily    nicotine  1 patch Transdermal Daily      Continuous Infusions:     PRN Meds:  Current Facility-Administered Medications:     acetaminophen, 1,000 mg, Oral, Q6H PRN    aluminum-magnesium hydroxide-simethicone, 30 mL, Oral, QID PRN    bisacodyL, 10 mg, Rectal, Daily PRN    dextrose 50%, 12.5 g, Intravenous, PRN    dextrose 50%, 25 g, Intravenous, PRN    glucagon (human recombinant), 1 mg, Intramuscular, PRN    glucose, 16 g, Oral, PRN    glucose, 24 g, Oral, PRN    hydrOXYzine pamoate, 50 mg, Oral, Q6H PRN    melatonin, 6 mg, Oral, Nightly PRN    naloxone, 0.02 mg, Intravenous, PRN    ondansetron, 4 mg, Intravenous, Q4H PRN    oxyCODONE, 5 mg, Oral, Q4H PRN    prochlorperazine, 5 mg, Intravenous, Q6H PRN    senna-docusate 8.6-50 mg, 1 tablet, Oral, BID PRN    sodium chloride 0.9%, 10 mL, Intravenous, PRN     Radiology:  I have personally reviewed the following imaging and agree with the radiologist.     Echo    Left Ventricle: The left ventricle is normal in size. There is   concentric remodeling. Regional wall motion abnormalities present.   Inferior and inferior lateral walls are hypokinetic. There is mildly   reduced systolic function with a visually estimated  ejection fraction of   45%. There is diastolic dysfunction.    Right Ventricle: The right ventricle is normal in size. Systolic   function is normal.    Aortic Valve: The aortic valve is a trileaflet valve. Moderately   calcified cusps. Mildly restricted motion. There is mild stenosis. Aortic   valve area by VTI is 1.4 cm². Aortic valve peak velocity is 2.5 m/s. Mean   gradient is 16 mmHg. The dimensionless index is 0.41. There is trace   aortic regurgitation.    Mitral Valve: There is no significant regurgitation.    Tricuspid Valve: There is no significant regurgitation.    Pericardium: There is no pericardial effusion.    No obvious evidence of valvular vegetations.    Can consider SHRUTHI to further evaluate for infective endocarditis if   clinically indicated.      Carlos Murcia MD  Department of Hospital Medicine   [facility]   03/18/2025

## 2025-03-19 LAB
ANION GAP SERPL CALC-SCNC: 9 MEQ/L
BASOPHILS # BLD AUTO: 0.08 X10(3)/MCL
BASOPHILS NFR BLD AUTO: 0.5 %
BUN SERPL-MCNC: 19.4 MG/DL (ref 8.4–25.7)
CALCIUM SERPL-MCNC: 8.9 MG/DL (ref 8.4–10.2)
CHLORIDE SERPL-SCNC: 105 MMOL/L (ref 98–107)
CO2 SERPL-SCNC: 23 MMOL/L (ref 22–29)
CREAT SERPL-MCNC: 0.67 MG/DL (ref 0.72–1.25)
CREAT/UREA NIT SERPL: 29
EOSINOPHIL # BLD AUTO: 0.42 X10(3)/MCL (ref 0–0.9)
EOSINOPHIL NFR BLD AUTO: 2.7 %
ERYTHROCYTE [DISTWIDTH] IN BLOOD BY AUTOMATED COUNT: 12.3 % (ref 11.5–17)
GFR SERPLBLD CREATININE-BSD FMLA CKD-EPI: >60 ML/MIN/1.73/M2
GLUCOSE SERPL-MCNC: 100 MG/DL (ref 74–100)
HCT VFR BLD AUTO: 44.2 % (ref 42–52)
HGB BLD-MCNC: 14.8 G/DL (ref 14–18)
IMM GRANULOCYTES # BLD AUTO: 0.32 X10(3)/MCL (ref 0–0.04)
IMM GRANULOCYTES NFR BLD AUTO: 2.1 %
LYMPHOCYTES # BLD AUTO: 3.22 X10(3)/MCL (ref 0.6–4.6)
LYMPHOCYTES NFR BLD AUTO: 20.8 %
MAGNESIUM SERPL-MCNC: 2.1 MG/DL (ref 1.6–2.6)
MCH RBC QN AUTO: 29.4 PG (ref 27–31)
MCHC RBC AUTO-ENTMCNC: 33.5 G/DL (ref 33–36)
MCV RBC AUTO: 87.7 FL (ref 80–94)
MONOCYTES # BLD AUTO: 1.56 X10(3)/MCL (ref 0.1–1.3)
MONOCYTES NFR BLD AUTO: 10.1 %
NEUTROPHILS # BLD AUTO: 9.86 X10(3)/MCL (ref 2.1–9.2)
NEUTROPHILS NFR BLD AUTO: 63.8 %
NRBC BLD AUTO-RTO: 0 %
PLATELET # BLD AUTO: 672 X10(3)/MCL (ref 130–400)
PMV BLD AUTO: 10.3 FL (ref 7.4–10.4)
POTASSIUM SERPL-SCNC: 5.4 MMOL/L (ref 3.5–5.1)
RBC # BLD AUTO: 5.04 X10(6)/MCL (ref 4.7–6.1)
SODIUM SERPL-SCNC: 137 MMOL/L (ref 136–145)
WBC # BLD AUTO: 15.46 X10(3)/MCL (ref 4.5–11.5)

## 2025-03-19 PROCEDURE — 36415 COLL VENOUS BLD VENIPUNCTURE: CPT | Performed by: INTERNAL MEDICINE

## 2025-03-19 PROCEDURE — 25000003 PHARM REV CODE 250: Performed by: INTERNAL MEDICINE

## 2025-03-19 PROCEDURE — 25000003 PHARM REV CODE 250: Performed by: STUDENT IN AN ORGANIZED HEALTH CARE EDUCATION/TRAINING PROGRAM

## 2025-03-19 PROCEDURE — 63600175 PHARM REV CODE 636 W HCPCS: Performed by: INTERNAL MEDICINE

## 2025-03-19 PROCEDURE — 83735 ASSAY OF MAGNESIUM: CPT | Performed by: INTERNAL MEDICINE

## 2025-03-19 PROCEDURE — 85025 COMPLETE CBC W/AUTO DIFF WBC: CPT | Performed by: INTERNAL MEDICINE

## 2025-03-19 PROCEDURE — 63600175 PHARM REV CODE 636 W HCPCS: Performed by: STUDENT IN AN ORGANIZED HEALTH CARE EDUCATION/TRAINING PROGRAM

## 2025-03-19 PROCEDURE — 25000003 PHARM REV CODE 250: Performed by: NURSE PRACTITIONER

## 2025-03-19 PROCEDURE — 21400001 HC TELEMETRY ROOM

## 2025-03-19 PROCEDURE — S4991 NICOTINE PATCH NONLEGEND: HCPCS | Performed by: STUDENT IN AN ORGANIZED HEALTH CARE EDUCATION/TRAINING PROGRAM

## 2025-03-19 PROCEDURE — 80048 BASIC METABOLIC PNL TOTAL CA: CPT | Performed by: INTERNAL MEDICINE

## 2025-03-19 RX ADMIN — NICOTINE 1 PATCH: 14 PATCH TRANSDERMAL at 08:03

## 2025-03-19 RX ADMIN — OXYCODONE HYDROCHLORIDE 5 MG: 5 TABLET ORAL at 12:03

## 2025-03-19 RX ADMIN — CEFAZOLIN SODIUM 2 G: 2 SOLUTION INTRAVENOUS at 04:03

## 2025-03-19 RX ADMIN — OXYCODONE HYDROCHLORIDE 5 MG: 5 TABLET ORAL at 08:03

## 2025-03-19 RX ADMIN — HYDROXYZINE PAMOATE 50 MG: 50 CAPSULE ORAL at 06:03

## 2025-03-19 RX ADMIN — FENOFIBRATE 145 MG: 145 TABLET, FILM COATED ORAL at 08:03

## 2025-03-19 RX ADMIN — CEFAZOLIN SODIUM 2 G: 2 SOLUTION INTRAVENOUS at 08:03

## 2025-03-19 RX ADMIN — ENOXAPARIN SODIUM 40 MG: 40 INJECTION SUBCUTANEOUS at 04:03

## 2025-03-19 RX ADMIN — OXYCODONE HYDROCHLORIDE 5 MG: 5 TABLET ORAL at 04:03

## 2025-03-19 RX ADMIN — ASPIRIN 81 MG: 81 TABLET, COATED ORAL at 08:03

## 2025-03-19 RX ADMIN — NALOXEGOL OXALATE 25 MG: 25 TABLET, FILM COATED ORAL at 08:03

## 2025-03-19 RX ADMIN — CEFAZOLIN SODIUM 2 G: 2 SOLUTION INTRAVENOUS at 11:03

## 2025-03-19 RX ADMIN — SODIUM ZIRCONIUM CYCLOSILICATE 10 G: 10 POWDER, FOR SUSPENSION ORAL at 04:03

## 2025-03-19 RX ADMIN — HYDROXYZINE PAMOATE 50 MG: 50 CAPSULE ORAL at 12:03

## 2025-03-19 RX ADMIN — Medication 6 MG: at 08:03

## 2025-03-19 RX ADMIN — METOPROLOL SUCCINATE 25 MG: 25 TABLET, EXTENDED RELEASE ORAL at 08:03

## 2025-03-19 NOTE — PROGRESS NOTES
"  [facility]    Cardiology  Pregess Note    Patient Name: Kimani Redd  MRN: [id number]  Admission Date: 3/15/2025  Hospital Length of Stay: 4 days  Code Status: Full Code   Attending Provider: Carlos Murcia MD   Consulting Provider: KAYA Caldwell  Primary Care Physician: Jessi Leyva FNP  Principal Problem:<principal problem not specified>    Patient information was obtained from patient, past medical records, and ER records.     Subjective:   Chief Complaint/Reason for Consult: SHRUTHI    HPI:   Mr. Redd is a 58 y/o male, unknown to [facility], who presented to ED 3.15.25 with c/o left shoulder pain and was found to have an abscess on his left shoulder. He underwent an I&D in ED and was started on ABX and admitted for further workup. Blood cultures + MSSA. Echo has not evidence of valvular vegetations. Orthopedic surgery has no plans for operative intervention-- no suspicion for septic arthritis of shoulder and no indication for deep debridement or arthrotomy.  ID consulted and recommending SHRUTHI;  therefore [facility] has been consulted for this reason  **CT shoulder negative for acute or destructive osseous process  ** of note, patient reports he had an MI 7 years prior and underwent LHC with placement of 2 stents. He has not had follow-up with cardiologist since then. He was unsure if he has a hx of HF. He denies CP or SOB.     3.19.25: NAD. No complaints of CP/SOB/Palps. "I feel okay"WBC 15.46, PLTs 672, K 5.4    PMH: kidney stone, HTN, CAD  PSH: LHC/stent x2  Family History: unknown  Social History: smoker    Previous Cardiac Diagnostics:   TTE 03.16.25:  Left Ventricle: The left ventricle is normal in size. There is concentric remodeling. Regional wall motion abnormalities present. Inferior and inferior lateral walls are hypokinetic. There is mildly reduced systolic function with a visually estimated ejection fraction of 45%. There is diastolic dysfunction. Right Ventricle: The right " ventricle is normal in size. Systolic function is normal.  Aortic Valve: The aortic valve is a trileaflet valve. Moderately calcified cusps. Mildly restricted motion. There is mild stenosis. Aortic valve area by VTI is 1.4 cm². Aortic valve peak velocity is 2.5 m/s. Mean gradient is 16 mmHg. The dimensionless index is 0.41. There is trace aortic regurgitation. Mitral Valve: There is no significant regurgitation. Tricuspid Valve: There is no significant regurgitation. Pericardium: There is no pericardial effusion.  No obvious evidence of valvular vegetations.     Can consider SHRUTHI to further evaluate for infective endocarditis if clinically indicated.    Past Medical History:   Diagnosis Date    Kidney stone     passed 2 weeks ago     Past Surgical History:   Procedure Laterality Date    CAROTID STENT       Review of patient's allergies indicates:  No Known Allergies  No current facility-administered medications on file prior to encounter.     Current Outpatient Medications on File Prior to Encounter   Medication Sig    aspirin (ECOTRIN) 81 MG EC tablet Take 81 mg by mouth once daily.    diclofenac (VOLTAREN) 50 MG EC tablet Take 1 tablet (50 mg total) by mouth 3 (three) times daily as needed (pain).    fenofibrate 160 MG Tab Take 1 tablet (160 mg total) by mouth once daily.    gabapentin (NEURONTIN) 300 MG capsule Take 1 capsule (300 mg total) by mouth 3 (three) times daily. for 10 days    [] HYDROcodone-acetaminophen (NORCO) 7.5-325 mg per tablet Take 1 tablet by mouth every 6 (six) hours as needed for Pain.    LIDOcaine (LIDODERM) 5 % Place 1 patch onto the skin once daily. Remove & Discard patch within 12 hours or as directed by MD    predniSONE (DELTASONE) 20 MG tablet Take 1 tablet (20 mg total) by mouth once daily.    metoprolol tartrate (LOPRESSOR) 25 MG tablet Take 0.5 tablets (12.5 mg total) by mouth once daily.    simvastatin (ZOCOR) 40 MG tablet TAKE ONE TABLET BY MOUTH EVERY EVENING       Review of  "Systems   Constitutional:  Negative for diaphoresis.   Respiratory:  Negative for cough and chest tightness.    Cardiovascular:  Negative for chest pain, palpitations and leg swelling.   Musculoskeletal:  Negative for arthralgias.   Skin:  Positive for wound.   All other systems reviewed and are negative.    Objective:     Vital Signs (Most Recent):  Temp: 97.9 °F (36.6 °C) (03/19/25 0401)  Pulse: 83 (03/19/25 0401)  Resp: 17 (03/19/25 0426)  BP: 130/78 (03/19/25 0401)  SpO2: (!) 94 % (03/19/25 0401) Vital Signs (24h Range):  Temp:  [97.9 °F (36.6 °C)-98.4 °F (36.9 °C)] 97.9 °F (36.6 °C)  Pulse:  [79-92] 83  Resp:  [14-22] 17  SpO2:  [94 %-99 %] 94 %  BP: (118-167)/(71-85) 130/78   Weight: 74.8 kg (165 lb)  Body mass index is 27.46 kg/m².  SpO2: (!) 94 %       Intake/Output Summary (Last 24 hours) at 3/19/2025 0753  Last data filed at 3/18/2025 1418  Gross per 24 hour   Intake 360 ml   Output --   Net 360 ml     Lines/Drains/Airways       Peripheral Intravenous Line  Duration                  Peripheral IV - Single Lumen 03/18/25 1700 20 G Posterior;Right Forearm <1 day                  Significant Labs:   Chemistries:   Recent Labs   Lab 03/14/25  1955 03/15/25  0441 03/16/25  0533 03/17/25  0948 03/19/25  0340    138 136  --  137   K 4.1 4.0 4.4  --  5.4*    102 102  --  105   CO2 24 23 22  --  23   BUN 18.2 15.6 13.9  --  19.4   CREATININE 0.78 0.68* 0.63* 0.68* 0.67*   CALCIUM 9.3 9.0 8.8  --  8.9   BILITOT 0.2 0.4  --   --   --    ALKPHOS 123 123  --   --   --    ALT 17 12  --   --   --    AST 10 7  --   --   --    GLUCOSE 125* 112* 101*  --  100   MG  --  1.80  --   --  2.10        CBC/Anemia Labs: Coags:    Recent Labs   Lab 03/17/25  0756 03/18/25  0831 03/19/25  0340   WBC 21.70* 15.60* 15.46*   HGB 14.8 15.3 14.8   HCT 44.9 45.0 44.2   * 648* 672*   MCV 89.1 87.4 87.7   RDW 12.4 12.3 12.3    No results for input(s): "PT", "INR", "APTT" in the last 168 hours.     Significant " Imaging:  Imaging Results    None       EKG:   None on file      Physical Exam  Cardiovascular:      Rate and Rhythm: Normal rate and regular rhythm.      Pulses: Normal pulses.      Heart sounds: Normal heart sounds.   Pulmonary:      Effort: Pulmonary effort is normal.      Breath sounds: Normal breath sounds.   Abdominal:      Palpations: Abdomen is soft.   Musculoskeletal:         General: Normal range of motion.   Skin:     General: Skin is warm.      Capillary Refill: Capillary refill takes less than 2 seconds.   Neurological:      General: No focal deficit present.      Mental Status: He is alert.   Psychiatric:         Mood and Affect: Mood normal.       Home Medications:   Medications Ordered Prior to Encounter[1]  Current Schedule Inpatient Medications:   aspirin  81 mg Oral Daily    ceFAZolin (Ancef) IV (PEDS and ADULTS)  2 g Intravenous Q8H    enoxparin  40 mg Subcutaneous Q24H (prophylaxis, 1700)    fenofibrate  145 mg Oral Daily    metoprolol succinate  25 mg Oral Daily    naloxegoL  25 mg Oral Daily    nicotine  1 patch Transdermal Daily     Continuous Infusions:    Assessment:   Bacteremia    - blood cultures/wound cultures + MSSA    - leukocytosis improving  Left shoulder abscess    - s/p I&D  Thrombocytosis   Leukocytosis   Hyperkalemia   CMO, unspecified    - TTE 03.16.25: EF 45% with RWMA    - reported Hx CAD with 2 stents placed 2018  HTN  Tobacco dependence  No known history of GI Bleed     Plan:   NPO after MN   Plan for SHRUTHI on 3.20.25  Consent Obtained (Placed in chart)/Procedure Scheduled   Continue Toprol 25 mg daily  Outpatient Ischemic Evaluation with Stress Test (SPECT)  No ACEi/ARB/ARNI given Hyperkalemia; will address once hyperkalemia resolved    Hyperkalemia per Primary Team   Labs in AM: CBC, BMP, and Mag     KAYA Caldwell  Cardiology  [facility]          [1]   No current facility-administered medications on file prior to encounter.     Current Outpatient  Medications on File Prior to Encounter   Medication Sig Dispense Refill    aspirin (ECOTRIN) 81 MG EC tablet Take 81 mg by mouth once daily.      diclofenac (VOLTAREN) 50 MG EC tablet Take 1 tablet (50 mg total) by mouth 3 (three) times daily as needed (pain). 30 tablet 0    fenofibrate 160 MG Tab Take 1 tablet (160 mg total) by mouth once daily. 90 tablet 1    gabapentin (NEURONTIN) 300 MG capsule Take 1 capsule (300 mg total) by mouth 3 (three) times daily. for 10 days 30 capsule 0    [] HYDROcodone-acetaminophen (NORCO) 7.5-325 mg per tablet Take 1 tablet by mouth every 6 (six) hours as needed for Pain. 12 tablet 0    LIDOcaine (LIDODERM) 5 % Place 1 patch onto the skin once daily. Remove & Discard patch within 12 hours or as directed by MD 30 patch 0    predniSONE (DELTASONE) 20 MG tablet Take 1 tablet (20 mg total) by mouth once daily. 5 tablet 0    metoprolol tartrate (LOPRESSOR) 25 MG tablet Take 0.5 tablets (12.5 mg total) by mouth once daily. 30 tablet 11    simvastatin (ZOCOR) 40 MG tablet TAKE ONE TABLET BY MOUTH EVERY EVENING 30 tablet 11

## 2025-03-19 NOTE — PROGRESS NOTES
[facility]        Chief Complaint: Inpatient Follow-up for left shoulder septic joint    HPI per admitting team: Kimani Redd is a 59 y.o. male who  has a past medical history of Kidney stone.. The patient presented to [facility] on 3/15/2025 with a primary complaint of left shoulder pain.  Patient arrived from an outlying facility for concern of septic arthritis and abscess over the left shoulder.  Patient has not had no recent injury to his left shoulder or prior surgery.  Patient noted a bump that got worsened along with redness and warmth.  Status post I&D done in the ER.  Patient notes improvement in his shoulder pain and symptoms after I&D.  Labs were significant for leukocytosis along with the elevated inflammatory markers.  Patient was admitted to us for further workup     Interval Hx:   Patient at the side of the bed, still complained of throbbing pain in the left elbow, informed that we will start tapering the pain medication to Q 8 in 2 days if no further intervention/procedure done   He is still waiting to meet with the financial counselor to see if they can start the Medicaid application  Wife and family at bedside  Case was discussed with patient's nurse and  on the floor.      Objective/physical exam:  General: In no acute distress, afebrile, uncomfortable with left shoulder pain  Chest: Clear to auscultation bilaterally anteriorly  Heart:  Regular rate and rhythm, +S1, S2, no appreciable murmur  Abdomen: Soft, nontender, BS +  MSK:  Dressing left shoulder, limited range of motion  Neurologic: Alert and oriented x4, Cranial nerve II-XII intact    VITAL SIGNS: 24 HRS MIN & MAX LAST   Temp  Min: 97.9 °F (36.6 °C)  Max: 98.3 °F (36.8 °C) 98.3 °F (36.8 °C)   BP  Min: 115/73  Max: 167/74 124/80   Pulse  Min: 83  Max: 95  84   Resp  Min: 14  Max: 18 18   SpO2  Min: 94 %  Max: 97 % 96 %     I have reviewed the following labs:  Recent Labs    Lab 03/17/25  0756 03/18/25  0831 03/19/25  0340   WBC 21.70* 15.60* 15.46*   RBC 5.04 5.15 5.04   HGB 14.8 15.3 14.8   HCT 44.9 45.0 44.2   MCV 89.1 87.4 87.7   MCH 29.4 29.7 29.4   MCHC 33.0 34.0 33.5   RDW 12.4 12.3 12.3   * 648* 672*   MPV 10.5* 10.5* 10.3     Recent Labs   Lab 03/14/25  1955 03/15/25  0441 03/16/25  0533 03/17/25  0948 03/19/25  0340    138 136  --  137   K 4.1 4.0 4.4  --  5.4*    102 102  --  105   CO2 24 23 22  --  23   BUN 18.2 15.6 13.9  --  19.4   CREATININE 0.78 0.68* 0.63* 0.68* 0.67*   CALCIUM 9.3 9.0 8.8  --  8.9   MG  --  1.80  --   --  2.10   ALBUMIN 2.6* 2.7*  --   --   --    ALKPHOS 123 123  --   --   --    ALT 17 12  --   --   --    AST 10 7  --   --   --    BILITOT 0.2 0.4  --   --   --      Microbiology Results (last 7 days)       Procedure Component Value Units Date/Time    Blood Culture [[id number]]  (Normal) Collected: 03/17/25 1602    Order Status: Completed Specimen: Blood Updated: 03/18/25 2001     Blood Culture No Growth At 24 Hours    Blood Culture [[id number]]  (Normal) Collected: 03/17/25 1602    Order Status: Completed Specimen: Blood Updated: 03/18/25 2001     Blood Culture No Growth At 24 Hours    Wound Culture [[id number]]  (Abnormal)  (Susceptibility) Collected: 03/15/25 0144    Order Status: Completed Specimen: Abscess from Shoulder, Left Updated: 03/17/25 0655     Wound Culture Many Methicillin Sensitive Staphylococcus aureus             See below for Radiology    Intake/Output:No intake or output data in the 24 hours ending 03/19/25 1814         Assessment/Plan:  MSSA bacteremia likely secondary to left shoulder abscess  Left shoulder abscess status post I and D - 03/15--> MSSA  Elevated inflammatory markers, secondary to above   Leukocytosis, secondary to above - trending down  Known hypertension  History of MI/CAD, CMO 45% with RWMA, DD  Tobacco use  Mild hyperkalemia       ID on board, appreciate recs  Echo- mildly reduced EF of 45%.   There is diastolic dysfunction.  Right ventricular systolic function is normal.  Mild aortic stenosis, trace aortic regurg.  No obvious evidence of valvular vegetations  ID requested SHRUTHI, cardiology on board, plan for SHRUTHI tomorrow, 3/20  NPO after midnight   Ortho on board, appreciate recs, continue IV antibiotics, No indication for operative intervention for septic arthritis of the left shoulder.   3/15- left shoulder abscess culture -MSSA  3/17- repeat bedside I and D per orthopedic  2/2 blood cultures - MSSA  3/17- Repeat Blood cx x 2- negative at 24 hours  No PICC line till blood cultures are negative for 48 hours  Cont cefazolin 2 g IV Q 8 per ID-day 5- per ID plan for 4 weeks of IV antibiotics from 1st negative culture, but if there is evidence of endovascular infection, recommended treatment for 6 weeks  MRSA PCR negative  Leukocytosis down to 15 K  Tachycardia improved with metoprolol, continue home aspirin 81 mg daily, fenofibrate 145 mg daily- improved  Mild hyperkalemia with potassium of 5.4, ordered Lokelma 10 g p.o. x1  Nicotine patch 14 mg daily  Appropriate home medication for chronic medical condition has been resumed  CM on board, case was personally discussed, awaiting financial counselor to meet with him to see if he will be eligible for Medicaid.  If he is approved, he can go home with IV antibiotic otherwise plan to transfer to [facility] given patient's sister is an RN working with Dr. Rain- ID  Morning CBC, BMP, mag ordered       VTE prophylaxis:  Lovenox    Patient condition:  Fair    Anticipated discharge and Disposition:  TBD, home with IV antibiotics if Medicaid approved versus transferred to [facility] where his sister works with ID department      All diagnosis and differential diagnosis have been reviewed; assessment and plan has been documented; I have personally reviewed the labs and test results that are presently available; I have reviewed the patients medication list; I have reviewed the  consulting providers response and recommendations. I have reviewed or attempted to review medical records based upon their availability    All of the patient's questions have been  addressed and answered. Patient's is agreeable to the above stated plan. I will continue to monitor closely and make adjustments to medical management as needed.    Portions of this note dictated using EMR integrated voice recognition software, and may be subject to voice recognition errors not corrected at proofreading. Please contact writer for clarification if needed.   _____________________________________________________________________    Malnutrition Status:  Nutrition consulted. Most recent weight and BMI monitored-     Measurements:  Wt Readings from Last 1 Encounters:   03/15/25 74.8 kg (165 lb)   Body mass index is 27.46 kg/m².    Patient has been screened and assessed by RD.    Malnutrition Type:  Context:    Level:      Malnutrition Characteristic Summary:       Interventions/Recommendations (treatment strategy):        Scheduled Med:   aspirin  81 mg Oral Daily    ceFAZolin (Ancef) IV (PEDS and ADULTS)  2 g Intravenous Q8H    enoxparin  40 mg Subcutaneous Q24H (prophylaxis, 1700)    fenofibrate  145 mg Oral Daily    metoprolol succinate  25 mg Oral Daily    naloxegoL  25 mg Oral Daily    nicotine  1 patch Transdermal Daily      Continuous Infusions:     PRN Meds:  Current Facility-Administered Medications:     acetaminophen, 1,000 mg, Oral, Q6H PRN    aluminum-magnesium hydroxide-simethicone, 30 mL, Oral, QID PRN    bisacodyL, 10 mg, Rectal, Daily PRN    dextrose 50%, 12.5 g, Intravenous, PRN    dextrose 50%, 25 g, Intravenous, PRN    glucagon (human recombinant), 1 mg, Intramuscular, PRN    glucose, 16 g, Oral, PRN    glucose, 24 g, Oral, PRN    hydrOXYzine pamoate, 50 mg, Oral, Q6H PRN    melatonin, 6 mg, Oral, Nightly PRN    naloxone, 0.02 mg, Intravenous, PRN    ondansetron, 4 mg, Intravenous, Q4H PRN    oxyCODONE, 5 mg,  Oral, Q4H PRN    prochlorperazine, 5 mg, Intravenous, Q6H PRN    senna-docusate 8.6-50 mg, 1 tablet, Oral, BID PRN    sodium chloride 0.9%, 10 mL, Intravenous, PRN     Radiology:  I have personally reviewed the following imaging and agree with the radiologist.     Echo    Left Ventricle: The left ventricle is normal in size. There is   concentric remodeling. Regional wall motion abnormalities present.   Inferior and inferior lateral walls are hypokinetic. There is mildly   reduced systolic function with a visually estimated ejection fraction of   45%. There is diastolic dysfunction.    Right Ventricle: The right ventricle is normal in size. Systolic   function is normal.    Aortic Valve: The aortic valve is a trileaflet valve. Moderately   calcified cusps. Mildly restricted motion. There is mild stenosis. Aortic   valve area by VTI is 1.4 cm². Aortic valve peak velocity is 2.5 m/s. Mean   gradient is 16 mmHg. The dimensionless index is 0.41. There is trace   aortic regurgitation.    Mitral Valve: There is no significant regurgitation.    Tricuspid Valve: There is no significant regurgitation.    Pericardium: There is no pericardial effusion.    No obvious evidence of valvular vegetations.    Can consider SHRUTHI to further evaluate for infective endocarditis if   clinically indicated.      Carlos Murcia MD  Department of Hospital Medicine   [facility]   03/19/2025

## 2025-03-19 NOTE — PROGRESS NOTES
Tele-Infectious Diseases Follow-Up       Patient Name: Kimani Redd  Patient : 1965  Age/Sex: 59 y.o. male  Room/Bed: 536/536 A  Admission Date/Time: 3/15/2025 12:35 AM    Date: 3/19/2025  Time: 8:30 AM    Assessment:     Kimani Redd is a 59 y.o. male with:  MSSA bacteremia:  Source unclear  Left shoulder abscess s/p I and D at bedside 3/15 and 3/17, no cultures obtained  Leukocytosis, 2/2 above, Improving    I explained the logistics of home health/IV antibiotic to patient and his wife at bedside.  I explained to him that we are still waiting for repeat blood cultures to document sterilization before placing any long-term line. He continues to have pain in his left shoulder although this seems to be slowly improving.  There is still some pus coming out from his wound.  Discussed with orthopedic team, hold off on obtaining MRI for now.    Plan:     Pending repeated blood culture from 3/17.  It is negative at 24 hours.  If it remains negative at 48 hours, may place PICC line  Continue IV cefazolin 2 g every 8 hours  Plan for SHRUTHI on Thursday  Anticipating 4 weeks of IV antibiotic from first negative blood culture, 6 weeks if he has evidence of endovascular infection.  No PICC line until Bcx is negative x 48 hours    All questions and concerns addressed with patient and understands and agrees with plan.      Thank you very much for allowing me to participate in the care of this patient.      ID will continue to follow. Please page with questions.    Diana Fraire MD   Attending, Infectious Disease     Reason for follow-up:      MSSA bacteremia    Summary:     Kimani Redd is a 59 y.o. male with a history significant for hypertension, tobacco use who was admitted on 3/15/2025 with left shoulder pain. Patient initially went to [facility] with left shoulder pain. There was a concern for possible septic arthritis. Patient reports no injury or trauma.  He has been having pain in his left shoulder  for the last week or so. Patient received a dose of vancomycin/Zosyn at the outside hospital and then transferred here for further evaluation.  His laboratory workup was remarkable for white cell count of 25,000.  He underwent CT of his left shoulder which was unremarkable. He was seen by Orthopedic surgery team. He underwent I&D at bedside. He had 2 sets of blood culture obtained which are growing methicillin sensitive Staphylococcus aureus. He underwent transthoracic echo which showed no clear vegetations.  He noted to have purulent drainage from his left shoulder on  and underwent I&D at bedside.  Repeated blood culture from 3/17 remains negative at 24 hours.    Antimicrobial history:      Zosyn 3/14-3/15  Vancomycin 3/14-3/17   IV cefazolin 3/17-    24 hours events:      No acute events overnight   Remains hemodynamically stable  WBC stable at 15.46    Subjective     Patient seen and examined, chart reviewed.  Patient said that he continues to have shoulder pain.     Review of Symptoms:     Patient reports no nausea, vomiting, diarrhea, cough, dyspnea, chest pain or palpitations    Objective     Vital signs  Vitals:    25 0045 25 0400 25 0401 25 0426   BP: 133/84  130/78    Pulse: 83  83    Resp:  16  17   Temp: 97.9 °F (36.6 °C)  97.9 °F (36.6 °C)    TempSrc: Oral  Oral    SpO2:   (!) 94%    Weight:       Height:          Temp (24hrs), Av.1 °F (36.7 °C), Min:97.9 °F (36.6 °C), Max:98.4 °F (36.9 °C)      Physical exam:  GEN: Awake, resting comfortably  EYES: EOMI, no scleral icterus  HENT: MMM. No oral lesions. Fair dentition.  NECK: Supple, no cervical lymphadenopathy or meningismus.   CARDIO: RRR, no murmur.  PULM/CHEST: CTAB. No increased work of breathing  ABD: Normal bowel sounds. Soft, not tender or distended. No HSM appreciated.  MSK: no obvious effusion, swelling, increased warmth, or erythema of major joints. No pedal edema.  SKIN: No rashes. No stigmata of  endocarditis.  NEURO: AOx3. Speech fluent. Face symmetric.  PSYCH: Mood appropriate      Intake/Output Summary (Last 24 hours) at 3/19/2025 0601  Last data filed at 3/18/2025 1418  Gross per 24 hour   Intake 360 ml   Output --   Net 360 ml        Diagnostic Test     CBC, INR  Recent Labs   Lab 03/15/25  0441 03/16/25  0533 03/17/25  0756 03/18/25  0831 03/19/25  0340   WBC 26.82  26.82* 25.01* 21.70* 15.60* 15.46*   HGB 14.3 14.8 14.8 15.3 14.8   * 518* 608* 648* 672*       BMP  Recent Labs   Lab 03/14/25  1955 03/15/25  0441 03/16/25  0533 03/17/25  0948 03/19/25  0340    138 136  --  137   K 4.1 4.0 4.4  --  5.4*    102 102  --  105   CO2 24 23 22  --  23   BUN 18.2 15.6 13.9  --  19.4   CREATININE 0.78 0.68* 0.63* 0.68* 0.67*   CALCIUM 9.3 9.0 8.8  --  8.9   MG  --  1.80  --   --  2.10       No results found for this or any previous visit.    Recent Labs   Lab 03/17/25 0756 03/18/25  0831 03/19/25  0340   WBC 21.70* 15.60* 15.46*   HGB 14.8 15.3 14.8   HCT 44.9 45.0 44.2   * 648* 672*     Estimated Creatinine Clearance: 114.9 mL/min (A) (based on SCr of 0.67 mg/dL (L)).    Lab Results   Component Value Date    CREATININE 0.67 (L) 03/19/2025    CREATININE 0.68 (L) 03/17/2025    CREATININE 0.63 (L) 03/16/2025    ALKPHOS 123 03/15/2025    ALKPHOS 123 03/14/2025    ALKPHOS 125 09/07/2024        Microbiology and ID tests:     Microbiology Results (last 7 days)       Procedure Component Value Units Date/Time    Blood Culture [[id number]]  (Normal) Collected: 03/17/25 1602    Order Status: Completed Specimen: Blood Updated: 03/18/25 2001     Blood Culture No Growth At 24 Hours    Blood Culture [[id number]]  (Normal) Collected: 03/17/25 1602    Order Status: Completed Specimen: Blood Updated: 03/18/25 2001     Blood Culture No Growth At 24 Hours    Wound Culture [[id number]]  (Abnormal)  (Susceptibility) Collected: 03/15/25 0144    Order Status: Completed Specimen: Abscess from Shoulder, Left  Updated: 03/17/25 0655     Wound Culture Many Methicillin Sensitive Staphylococcus aureus              Medications   Inpatient  Scheduled Meds:   aspirin  81 mg Oral Daily    ceFAZolin (Ancef) IV (PEDS and ADULTS)  2 g Intravenous Q8H    enoxparin  40 mg Subcutaneous Q24H (prophylaxis, 1700)    fenofibrate  145 mg Oral Daily    metoprolol succinate  25 mg Oral Daily    naloxegoL  25 mg Oral Daily    nicotine  1 patch Transdermal Daily     Continuous Infusions:  PRN Meds:.  Current Facility-Administered Medications:     acetaminophen, 1,000 mg, Oral, Q6H PRN    aluminum-magnesium hydroxide-simethicone, 30 mL, Oral, QID PRN    bisacodyL, 10 mg, Rectal, Daily PRN    dextrose 50%, 12.5 g, Intravenous, PRN    dextrose 50%, 25 g, Intravenous, PRN    glucagon (human recombinant), 1 mg, Intramuscular, PRN    glucose, 16 g, Oral, PRN    glucose, 24 g, Oral, PRN    hydrOXYzine pamoate, 50 mg, Oral, Q6H PRN    melatonin, 6 mg, Oral, Nightly PRN    naloxone, 0.02 mg, Intravenous, PRN    ondansetron, 4 mg, Intravenous, Q4H PRN    oxyCODONE, 5 mg, Oral, Q4H PRN    prochlorperazine, 5 mg, Intravenous, Q6H PRN    senna-docusate 8.6-50 mg, 1 tablet, Oral, BID PRN    sodium chloride 0.9%, 10 mL, Intravenous, PRN    Home Meds  Current Outpatient Medications   Medication Instructions    aspirin (ECOTRIN) 81 mg, Daily    diclofenac (VOLTAREN) 50 mg, Oral, 3 times daily PRN    fenofibrate 160 mg, Oral, Daily    gabapentin (NEURONTIN) 300 mg, Oral, 3 times daily    LIDOcaine (LIDODERM) 5 % 1 patch, Transdermal, Daily, Remove & Discard patch within 12 hours or as directed by MD    metoprolol tartrate (LOPRESSOR) 12.5 mg, Oral, Daily    predniSONE (DELTASONE) 20 mg, Oral, Daily    simvastatin (ZOCOR) 40 mg, Oral, Nightly       Imaging (personally reviewed):     No orders to display         3/19/2025 8:30 AM    The attending portion of this evaluation, treatment, and documentation was performed per Diana Fraire MD via Telemedicine  AudioVisual using the secure GetThis software platform with 2 way audio/video. The provider was located off-site and the patient is located in the hospital. The aforementioned video software was utilized to document the relevant history and physical exam.     Critical care time spent: >35 minutes

## 2025-03-20 ENCOUNTER — ANESTHESIA EVENT (OUTPATIENT)
Dept: CARDIOLOGY | Facility: HOSPITAL | Age: 60
End: 2025-03-20

## 2025-03-20 ENCOUNTER — ANESTHESIA (OUTPATIENT)
Dept: CARDIOLOGY | Facility: HOSPITAL | Age: 60
End: 2025-03-20

## 2025-03-20 LAB
ANION GAP SERPL CALC-SCNC: 10 MEQ/L
BASOPHILS # BLD AUTO: 0.07 X10(3)/MCL
BASOPHILS NFR BLD AUTO: 0.4 %
BSA FOR ECHO PROCEDURE: 1.85 M2
BUN SERPL-MCNC: 17.9 MG/DL (ref 8.4–25.7)
CALCIUM SERPL-MCNC: 9.1 MG/DL (ref 8.4–10.2)
CHLORIDE SERPL-SCNC: 102 MMOL/L (ref 98–107)
CO2 SERPL-SCNC: 23 MMOL/L (ref 22–29)
CREAT SERPL-MCNC: 0.67 MG/DL (ref 0.72–1.25)
CREAT/UREA NIT SERPL: 27
EOSINOPHIL # BLD AUTO: 0.35 X10(3)/MCL (ref 0–0.9)
EOSINOPHIL NFR BLD AUTO: 2 %
ERYTHROCYTE [DISTWIDTH] IN BLOOD BY AUTOMATED COUNT: 12.3 % (ref 11.5–17)
GFR SERPLBLD CREATININE-BSD FMLA CKD-EPI: >60 ML/MIN/1.73/M2
GLUCOSE SERPL-MCNC: 92 MG/DL (ref 74–100)
HCT VFR BLD AUTO: 45 % (ref 42–52)
HGB BLD-MCNC: 14.8 G/DL (ref 14–18)
IMM GRANULOCYTES # BLD AUTO: 0.23 X10(3)/MCL (ref 0–0.04)
IMM GRANULOCYTES NFR BLD AUTO: 1.3 %
LYMPHOCYTES # BLD AUTO: 3.34 X10(3)/MCL (ref 0.6–4.6)
LYMPHOCYTES NFR BLD AUTO: 19.3 %
MAGNESIUM SERPL-MCNC: 2 MG/DL (ref 1.6–2.6)
MCH RBC QN AUTO: 29.4 PG (ref 27–31)
MCHC RBC AUTO-ENTMCNC: 32.9 G/DL (ref 33–36)
MCV RBC AUTO: 89.5 FL (ref 80–94)
MONOCYTES # BLD AUTO: 1.63 X10(3)/MCL (ref 0.1–1.3)
MONOCYTES NFR BLD AUTO: 9.4 %
NEUTROPHILS # BLD AUTO: 11.69 X10(3)/MCL (ref 2.1–9.2)
NEUTROPHILS NFR BLD AUTO: 67.6 %
NRBC BLD AUTO-RTO: 0 %
PLATELET # BLD AUTO: 706 X10(3)/MCL (ref 130–400)
PMV BLD AUTO: 10.4 FL (ref 7.4–10.4)
POTASSIUM SERPL-SCNC: 5.2 MMOL/L (ref 3.5–5.1)
RBC # BLD AUTO: 5.03 X10(6)/MCL (ref 4.7–6.1)
SODIUM SERPL-SCNC: 135 MMOL/L (ref 136–145)
WBC # BLD AUTO: 17.31 X10(3)/MCL (ref 4.5–11.5)

## 2025-03-20 PROCEDURE — 25000003 PHARM REV CODE 250: Performed by: NURSE PRACTITIONER

## 2025-03-20 PROCEDURE — 83735 ASSAY OF MAGNESIUM: CPT

## 2025-03-20 PROCEDURE — 85025 COMPLETE CBC W/AUTO DIFF WBC: CPT

## 2025-03-20 PROCEDURE — 25000003 PHARM REV CODE 250: Performed by: INTERNAL MEDICINE

## 2025-03-20 PROCEDURE — C1751 CATH, INF, PER/CENT/MIDLINE: HCPCS

## 2025-03-20 PROCEDURE — 80048 BASIC METABOLIC PNL TOTAL CA: CPT

## 2025-03-20 PROCEDURE — 25000003 PHARM REV CODE 250: Performed by: STUDENT IN AN ORGANIZED HEALTH CARE EDUCATION/TRAINING PROGRAM

## 2025-03-20 PROCEDURE — 02HV33Z INSERTION OF INFUSION DEVICE INTO SUPERIOR VENA CAVA, PERCUTANEOUS APPROACH: ICD-10-PCS | Performed by: INTERNAL MEDICINE

## 2025-03-20 PROCEDURE — 36415 COLL VENOUS BLD VENIPUNCTURE: CPT

## 2025-03-20 PROCEDURE — 63600175 PHARM REV CODE 636 W HCPCS: Performed by: INTERNAL MEDICINE

## 2025-03-20 PROCEDURE — 25000003 PHARM REV CODE 250: Performed by: NURSE ANESTHETIST, CERTIFIED REGISTERED

## 2025-03-20 PROCEDURE — S4991 NICOTINE PATCH NONLEGEND: HCPCS | Performed by: STUDENT IN AN ORGANIZED HEALTH CARE EDUCATION/TRAINING PROGRAM

## 2025-03-20 PROCEDURE — 36569 INSJ PICC 5 YR+ W/O IMAGING: CPT

## 2025-03-20 PROCEDURE — 63600175 PHARM REV CODE 636 W HCPCS: Performed by: NURSE ANESTHETIST, CERTIFIED REGISTERED

## 2025-03-20 PROCEDURE — 63600175 PHARM REV CODE 636 W HCPCS: Performed by: STUDENT IN AN ORGANIZED HEALTH CARE EDUCATION/TRAINING PROGRAM

## 2025-03-20 PROCEDURE — 21400001 HC TELEMETRY ROOM

## 2025-03-20 RX ORDER — LIDOCAINE HYDROCHLORIDE 20 MG/ML
INJECTION, SOLUTION EPIDURAL; INFILTRATION; INTRACAUDAL; PERINEURAL
Status: DISCONTINUED | OUTPATIENT
Start: 2025-03-20 | End: 2025-03-20

## 2025-03-20 RX ORDER — SODIUM CHLORIDE 9 MG/ML
INJECTION, SOLUTION INTRAVENOUS CONTINUOUS
Status: CANCELLED | OUTPATIENT
Start: 2025-03-20

## 2025-03-20 RX ORDER — PROPOFOL 10 MG/ML
VIAL (ML) INTRAVENOUS
Status: DISCONTINUED | OUTPATIENT
Start: 2025-03-20 | End: 2025-03-20

## 2025-03-20 RX ORDER — SODIUM CHLORIDE, SODIUM GLUCONATE, SODIUM ACETATE, POTASSIUM CHLORIDE AND MAGNESIUM CHLORIDE 30; 37; 368; 526; 502 MG/100ML; MG/100ML; MG/100ML; MG/100ML; MG/100ML
INJECTION, SOLUTION INTRAVENOUS CONTINUOUS
Status: CANCELLED | OUTPATIENT
Start: 2025-03-20 | End: 2025-04-19

## 2025-03-20 RX ORDER — SODIUM CHLORIDE, SODIUM LACTATE, POTASSIUM CHLORIDE, CALCIUM CHLORIDE 600; 310; 30; 20 MG/100ML; MG/100ML; MG/100ML; MG/100ML
INJECTION, SOLUTION INTRAVENOUS CONTINUOUS
Status: CANCELLED | OUTPATIENT
Start: 2025-03-20

## 2025-03-20 RX ORDER — SODIUM CHLORIDE 0.9 % (FLUSH) 0.9 %
10 SYRINGE (ML) INJECTION EVERY 12 HOURS PRN
Status: DISCONTINUED | OUTPATIENT
Start: 2025-03-20 | End: 2025-04-03 | Stop reason: HOSPADM

## 2025-03-20 RX ADMIN — SODIUM ZIRCONIUM CYCLOSILICATE 10 G: 10 POWDER, FOR SUSPENSION ORAL at 11:03

## 2025-03-20 RX ADMIN — ENOXAPARIN SODIUM 40 MG: 40 INJECTION SUBCUTANEOUS at 04:03

## 2025-03-20 RX ADMIN — OXYCODONE HYDROCHLORIDE 5 MG: 5 TABLET ORAL at 11:03

## 2025-03-20 RX ADMIN — CEFAZOLIN SODIUM 2 G: 2 SOLUTION INTRAVENOUS at 07:03

## 2025-03-20 RX ADMIN — PROPOFOL 70 MG: 10 INJECTION, EMULSION INTRAVENOUS at 10:03

## 2025-03-20 RX ADMIN — PROPOFOL 20 MG: 10 INJECTION, EMULSION INTRAVENOUS at 10:03

## 2025-03-20 RX ADMIN — OXYCODONE HYDROCHLORIDE 5 MG: 5 TABLET ORAL at 03:03

## 2025-03-20 RX ADMIN — Medication 6 MG: at 08:03

## 2025-03-20 RX ADMIN — PROPOFOL 10 MG: 10 INJECTION, EMULSION INTRAVENOUS at 10:03

## 2025-03-20 RX ADMIN — NICOTINE 1 PATCH: 14 PATCH TRANSDERMAL at 11:03

## 2025-03-20 RX ADMIN — CEFAZOLIN SODIUM 2 G: 2 SOLUTION INTRAVENOUS at 11:03

## 2025-03-20 RX ADMIN — CEFAZOLIN SODIUM 2 G: 2 SOLUTION INTRAVENOUS at 03:03

## 2025-03-20 RX ADMIN — HYDROXYZINE PAMOATE 50 MG: 50 CAPSULE ORAL at 11:03

## 2025-03-20 RX ADMIN — NALOXEGOL OXALATE 25 MG: 25 TABLET, FILM COATED ORAL at 11:03

## 2025-03-20 RX ADMIN — ASPIRIN 81 MG: 81 TABLET, COATED ORAL at 11:03

## 2025-03-20 RX ADMIN — OXYCODONE HYDROCHLORIDE 5 MG: 5 TABLET ORAL at 05:03

## 2025-03-20 RX ADMIN — OXYCODONE HYDROCHLORIDE 5 MG: 5 TABLET ORAL at 01:03

## 2025-03-20 RX ADMIN — METOPROLOL SUCCINATE 25 MG: 25 TABLET, EXTENDED RELEASE ORAL at 11:03

## 2025-03-20 RX ADMIN — HYDROXYZINE PAMOATE 50 MG: 50 CAPSULE ORAL at 02:03

## 2025-03-20 RX ADMIN — OXYCODONE HYDROCHLORIDE 5 MG: 5 TABLET ORAL at 07:03

## 2025-03-20 RX ADMIN — FENOFIBRATE 145 MG: 145 TABLET, FILM COATED ORAL at 11:03

## 2025-03-20 RX ADMIN — LIDOCAINE HYDROCHLORIDE 80 MG: 20 INJECTION, SOLUTION EPIDURAL; INFILTRATION; INTRACAUDAL; PERINEURAL at 10:03

## 2025-03-20 RX ADMIN — HYDROXYZINE PAMOATE 50 MG: 50 CAPSULE ORAL at 05:03

## 2025-03-20 RX ADMIN — SODIUM CHLORIDE: 9 INJECTION, SOLUTION INTRAVENOUS at 10:03

## 2025-03-20 NOTE — ANESTHESIA PREPROCEDURE EVALUATION
"                                                                                                             03/20/2025  Kimani Redd is a 59 y.o., male.  Pre-operative evaluation for * No procedures listed *         /82   Pulse 87   Temp 36.7 °C (98.1 °F) (Oral)   Resp 18   Ht 5' 5" (1.651 m)   Wt 74.8 kg (165 lb)   SpO2 98%   BMI 27.46 kg/m²     Past Medical History:   Diagnosis Date    Kidney stone     passed 2 weeks ago       Problem List[1]    Review of patient's allergies indicates:  No Known Allergies    Current Outpatient Medications   Medication Instructions    aspirin (ECOTRIN) 81 mg, Daily    diclofenac (VOLTAREN) 50 mg, Oral, 3 times daily PRN    fenofibrate 160 mg, Oral, Daily    gabapentin (NEURONTIN) 300 mg, Oral, 3 times daily    LIDOcaine (LIDODERM) 5 % 1 patch, Transdermal, Daily, Remove & Discard patch within 12 hours or as directed by MD    metoprolol tartrate (LOPRESSOR) 12.5 mg, Oral, Daily    predniSONE (DELTASONE) 20 mg, Oral, Daily    simvastatin (ZOCOR) 40 mg, Oral, Nightly       Past Surgical History:   Procedure Laterality Date    CAROTID STENT           Lab Results   Component Value Date    WBC 17.31 (H) 03/20/2025    HGB 14.8 03/20/2025    HCT 45.0 03/20/2025    MCV 89.5 03/20/2025     (H) 03/20/2025          BMP  Lab Results   Component Value Date     (L) 03/20/2025    K 5.2 (H) 03/20/2025    CO2 23 03/20/2025    GLUCOSE 92 03/20/2025    BUN 17.9 03/20/2025    CREATININE 0.67 (L) 03/20/2025    CALCIUM 9.1 03/20/2025    BILITOT 0.4 03/15/2025    AST 7 03/15/2025    ALT 12 03/15/2025        INR  No results for input(s): "PT", "INR", "PROTIME", "APTT" in the last 72 hours.    No results found for: "PREGTESTUR", "PREGSERUM", "HCG", "HCGQUANT"        EKG:  Results for orders placed or performed during the hospital encounter of 03/15/25   EKG 12-lead    Collection Time: 03/18/25 12:44 PM   Result Value Ref Range    QRS Duration 100 ms    [facility] QTC Calculation 442 ms "    Narrative    Test Reason : I42.9,    Vent. Rate :  89 BPM     Atrial Rate :  89 BPM     P-R Int : 124 ms          QRS Dur : 100 ms      QT Int : 364 ms       P-R-T Axes :  82 106  63 degrees    QTcB Int : 442 ms    Normal sinus rhythm  Rightward axis  Minimal voltage criteria for LVH, may be normal variant ( [place] product )  Cannot rule out Anterior infarct ,age undetermined  Abnormal ECG  No previous ECGs available  Confirmed by Paulino Fritz [id number]) on 3/18/2025 2:04:08 PM    Referred By: EUGENIO COX           Confirmed By: Paulino Fritz       Results for orders placed during the hospital encounter of 03/15/25    Echo    Interpretation Summary    Left Ventricle: The left ventricle is normal in size. There is concentric remodeling. Regional wall motion abnormalities present. Inferior and inferior lateral walls are hypokinetic. There is mildly reduced systolic function with a visually estimated ejection fraction of 45%. There is diastolic dysfunction.    Right Ventricle: The right ventricle is normal in size. Systolic function is normal.    Aortic Valve: The aortic valve is a trileaflet valve. Moderately calcified cusps. Mildly restricted motion. There is mild stenosis. Aortic valve area by VTI is 1.4 cm². Aortic valve peak velocity is 2.5 m/s. Mean gradient is 16 mmHg. The dimensionless index is 0.41. There is trace aortic regurgitation.    Mitral Valve: There is no significant regurgitation.    Tricuspid Valve: There is no significant regurgitation.    Pericardium: There is no pericardial effusion.    No obvious evidence of valvular vegetations.    Can consider SHRUTHI to further evaluate for infective endocarditis if clinically indicated.            Pre-op Assessment    I have reviewed the Patient Summary Reports.    I have reviewed the NPO Status.   I have reviewed the Medications.     Review of Systems  Anesthesia Hx:  No problems with previous Anesthesia             Denies Family Hx of Anesthesia  complications.    Denies Personal Hx of Anesthesia complications.                    Cardiovascular:  Cardiovascular Normal                   No Cardiac Complaints                           Pulmonary:  Pulmonary Normal        No Pulmonary Complaints               Hepatic/GI:        No Current GERD Sx                 Physical Exam  General: Alert and Oriented    Airway:  Mallampati: II   Mouth Opening: Normal  TM Distance: Normal  Tongue: Normal  Neck ROM: Normal ROM    Dental:  Intact    Chest/Lungs:  Clear to auscultation, Normal Respiratory Rate    Heart:  Rate: Normal  Rhythm: Regular Rhythm        Anesthesia Plan  Type of Anesthesia, risks & benefits discussed:    Anesthesia Type: Gen Natural Airway  Intra-op Monitoring Plan: Standard ASA Monitors  Post Op Pain Control Plan: multimodal analgesia  Induction:  IV  Airway Plan: Direct  Informed Consent: Informed consent signed with the Patient and all parties understand the risks and agree with anesthesia plan.  All questions answered.   ASA Score: 3  Day of Surgery Review of History & Physical: H&P Update referred to the surgeon/provider.  Anesthesia Plan Notes: Nasal cannula vs facemask supplemental oxygenation   For patients with JEFF/obesity, may consider SuperNoval Nasal CPAP      Poss conversion to General LMA/LOLA discussed          Ready For Surgery From Anesthesia Perspective.     .           [1]   Patient Active Problem List  Diagnosis    Encounter to establish care    Hypercholesteremia    Hypertriglyceridemia    Hypertension    Depression

## 2025-03-20 NOTE — PROGRESS NOTES
"  [facility]    Cardiology  Pregess Note    Patient Name: Kimani Redd  MRN: [id number]  Admission Date: 3/15/2025  Hospital Length of Stay: 5 days  Code Status: Full Code   Attending Provider: Carlos Murcia MD   Consulting Provider: Adenike Fournier NP  Primary Care Physician: Jessi Leyva FNP  Principal Problem:<principal problem not specified>    Patient information was obtained from patient, past medical records, and ER records.     Subjective:   Reason for Consult: SHRUTHI    HPI: Mr. Redd is a 58 y/o male, unknown to [facility], who presented to ED 3.15.25 with c/o left shoulder pain and was found to have an abscess on his left shoulder. He underwent an I&D in ED and was started on ABX and admitted for further workup. Blood cultures + MSSA. Echo has not evidence of valvular vegetations. Orthopedic surgery has no plans for operative intervention-- no suspicion for septic arthritis of shoulder and no indication for deep debridement or arthrotomy.  ID consulted and recommending SHRUTHI;  therefore [facility] has been consulted for this reason  **CT shoulder negative for acute or destructive osseous process  ** of note, patient reports he had an MI 7 years prior and underwent LHC with placement of 2 stents. He has not had follow-up with cardiologist since then. He was unsure if he has a hx of HF. He denies CP or SOB.     Hospital Course:   3.19.25: NAD. No complaints of CP/SOB/Palps. "I feel okay"WBC 15.46, PLTs 672, K 5.4  3.20.25: NADm, VSS, plan for SHRUTHI today, he remains NPO for procedure     PMH: kidney stone, HTN, CAD  PSH: LHC/stent x2  Family History: unknown  Social History: smoker    Previous Cardiac Diagnostics:   TTE 03.16.25:  Left Ventricle: The left ventricle is normal in size. There is concentric remodeling. Regional wall motion abnormalities present. Inferior and inferior lateral walls are hypokinetic. There is mildly reduced systolic function with a visually estimated ejection " fraction of 45%. There is diastolic dysfunction. Right Ventricle: The right ventricle is normal in size. Systolic function is normal.  Aortic Valve: The aortic valve is a trileaflet valve. Moderately calcified cusps. Mildly restricted motion. There is mild stenosis. Aortic valve area by VTI is 1.4 cm². Aortic valve peak velocity is 2.5 m/s. Mean gradient is 16 mmHg. The dimensionless index is 0.41. There is trace aortic regurgitation. Mitral Valve: There is no significant regurgitation. Tricuspid Valve: There is no significant regurgitation. Pericardium: There is no pericardial effusion.  No obvious evidence of valvular vegetations.     Can consider SHRUTHI to further evaluate for infective endocarditis if clinically indicated.    Past Medical History:   Diagnosis Date    Kidney stone     passed 2 weeks ago     Past Surgical History:   Procedure Laterality Date    CAROTID STENT       Review of patient's allergies indicates:  No Known Allergies  No current facility-administered medications on file prior to encounter.     Current Outpatient Medications on File Prior to Encounter   Medication Sig    aspirin (ECOTRIN) 81 MG EC tablet Take 81 mg by mouth once daily.    diclofenac (VOLTAREN) 50 MG EC tablet Take 1 tablet (50 mg total) by mouth 3 (three) times daily as needed (pain).    fenofibrate 160 MG Tab Take 1 tablet (160 mg total) by mouth once daily.    gabapentin (NEURONTIN) 300 MG capsule Take 1 capsule (300 mg total) by mouth 3 (three) times daily. for 10 days    LIDOcaine (LIDODERM) 5 % Place 1 patch onto the skin once daily. Remove & Discard patch within 12 hours or as directed by MD    predniSONE (DELTASONE) 20 MG tablet Take 1 tablet (20 mg total) by mouth once daily.    metoprolol tartrate (LOPRESSOR) 25 MG tablet Take 0.5 tablets (12.5 mg total) by mouth once daily.    simvastatin (ZOCOR) 40 MG tablet TAKE ONE TABLET BY MOUTH EVERY EVENING       Review of Systems   Constitutional:  Negative for diaphoresis.  "  Respiratory:  Negative for cough and chest tightness.    Cardiovascular:  Negative for chest pain.   Gastrointestinal:  Negative for abdominal pain and nausea.   Musculoskeletal:  Negative for arthralgias.   Skin:  Positive for wound.   All other systems reviewed and are negative.    Objective:     Vital Signs (Most Recent):  Temp: 98.1 °F (36.7 °C) (03/20/25 0742)  Pulse: 87 (03/20/25 0742)  Resp: 18 (03/20/25 0512)  BP: 133/82 (03/20/25 0742)  SpO2: 98 % (03/20/25 0742) Vital Signs (24h Range):  Temp:  [97.9 °F (36.6 °C)-98.4 °F (36.9 °C)] 98.1 °F (36.7 °C)  Pulse:  [80-95] 87  Resp:  [18-19] 18  SpO2:  [94 %-98 %] 98 %  BP: (115-133)/(73-85) 133/82   Weight: 74.8 kg (165 lb)  Body mass index is 27.46 kg/m².  SpO2: 98 %     No intake or output data in the 24 hours ending 03/20/25 0818    Lines/Drains/Airways       Peripheral Intravenous Line  Duration                  Peripheral IV - Single Lumen 03/18/25 1700 20 G Posterior;Right Forearm 1 day                  Significant Labs:   Chemistries:   Recent Labs   Lab 03/14/25  1955 03/15/25  0441 03/16/25  0533 03/17/25  0948 03/19/25  0340 03/20/25  0520    138 136  --  137 135*   K 4.1 4.0 4.4  --  5.4* 5.2*    102 102  --  105 102   CO2 24 23 22  --  23 23   BUN 18.2 15.6 13.9  --  19.4 17.9   CREATININE 0.78 0.68* 0.63* 0.68* 0.67* 0.67*   CALCIUM 9.3 9.0 8.8  --  8.9 9.1   BILITOT 0.2 0.4  --   --   --   --    ALKPHOS 123 123  --   --   --   --    ALT 17 12  --   --   --   --    AST 10 7  --   --   --   --    GLUCOSE 125* 112* 101*  --  100 92   MG  --  1.80  --   --  2.10 2.00        CBC/Anemia Labs: Coags:    Recent Labs   Lab 03/18/25  0831 03/19/25  0340 03/20/25  0520   WBC 15.60* 15.46* 17.31*   HGB 15.3 14.8 14.8   HCT 45.0 44.2 45.0   * 672* 706*   MCV 87.4 87.7 89.5   RDW 12.3 12.3 12.3    No results for input(s): "PT", "INR", "APTT" in the last 168 hours.     Significant Imaging:  Imaging Results    None       EKG:   None on " file      Physical Exam  Cardiovascular:      Rate and Rhythm: Normal rate and regular rhythm.      Pulses: Normal pulses.      Heart sounds: Normal heart sounds.   Pulmonary:      Effort: Pulmonary effort is normal.      Breath sounds: Normal breath sounds.   Abdominal:      Palpations: Abdomen is soft.   Musculoskeletal:         General: Normal range of motion.   Skin:     General: Skin is warm.      Capillary Refill: Capillary refill takes less than 2 seconds.   Neurological:      General: No focal deficit present.      Mental Status: He is alert.   Psychiatric:         Mood and Affect: Mood normal.       Home Medications:   Medications Ordered Prior to Encounter[1]  Current Schedule Inpatient Medications:   aspirin  81 mg Oral Daily    ceFAZolin (Ancef) IV (PEDS and ADULTS)  2 g Intravenous Q8H    enoxparin  40 mg Subcutaneous Q24H (prophylaxis, 1700)    fenofibrate  145 mg Oral Daily    metoprolol succinate  25 mg Oral Daily    naloxegoL  25 mg Oral Daily    nicotine  1 patch Transdermal Daily    sodium zirconium cyclosilicate  10 g Oral Once     Continuous Infusions:    Assessment:   Bacteremia    - blood cultures/wound cultures + MSSA    - leukocytosis improving  Left shoulder abscess    - s/p I&D  Thrombocytosis   Leukocytosis   Hyperkalemia   CMO, unspecified    - TTE 03.16.25: EF 45% with RWMA    - reported Hx CAD with 2 stents placed 2018  HTN  Tobacco dependence  No known history of GI Bleed     Plan:   Plan for SHRUTHI Today   Keep NPO until after the procedure   Consent Obtained (Placed in chart)/Procedure Scheduled   Continue Toprol 25 mg daily  Outpatient Ischemic Evaluation with Stress Test (SPECT)  No ACEi/ARB/ARNI given Hyperkalemia; will address once hyperkalemia resolved    Hyperkalemia per Primary Team   Labs in AM: CBC, BMP, and Mag     Adenike Fournier NP  Cardiology  [facility]          [1]   No current facility-administered medications on file prior to encounter.     Current  Outpatient Medications on File Prior to Encounter   Medication Sig Dispense Refill    aspirin (ECOTRIN) 81 MG EC tablet Take 81 mg by mouth once daily.      diclofenac (VOLTAREN) 50 MG EC tablet Take 1 tablet (50 mg total) by mouth 3 (three) times daily as needed (pain). 30 tablet 0    fenofibrate 160 MG Tab Take 1 tablet (160 mg total) by mouth once daily. 90 tablet 1    gabapentin (NEURONTIN) 300 MG capsule Take 1 capsule (300 mg total) by mouth 3 (three) times daily. for 10 days 30 capsule 0    LIDOcaine (LIDODERM) 5 % Place 1 patch onto the skin once daily. Remove & Discard patch within 12 hours or as directed by MD 30 patch 0    predniSONE (DELTASONE) 20 MG tablet Take 1 tablet (20 mg total) by mouth once daily. 5 tablet 0    metoprolol tartrate (LOPRESSOR) 25 MG tablet Take 0.5 tablets (12.5 mg total) by mouth once daily. 30 tablet 11    simvastatin (ZOCOR) 40 MG tablet TAKE ONE TABLET BY MOUTH EVERY EVENING 30 tablet 11

## 2025-03-20 NOTE — PROGRESS NOTES
[facility] Hospital Medicine Progress Note        Chief Complaint: Inpatient Follow-up for left shoulder septic joint    HPI per admitting team: Kimani Redd is a 59 y.o. male who  has a past medical history of Kidney stone.. The patient presented to [facility] on 3/15/2025 with a primary complaint of left shoulder pain.  Patient arrived from an outlying facility for concern of septic arthritis and abscess over the left shoulder.  Patient has not had no recent injury to his left shoulder or prior surgery.  Patient noted a bump that got worsened along with redness and warmth.  Status post I&D done in the ER.  Patient notes improvement in his shoulder pain and symptoms after I&D.  Labs were significant for leukocytosis along with the elevated inflammatory markers.  Patient was admitted to us for further workup   Left shoulder abscess was I and D on March 15th and that showed MSSA, patient had repeat bedside I and D with Orthopedics blood culture also grew MSSA Infectious diseases following and we are continuing with cefazolin plan for SHRUTHI today    Interval Hx:   Patient at the side of the bed, plan for SHRUTHI today   Case was discussed with patient's nurse and  on the floor.      Objective/physical exam:  General: In no acute distress, afebrile, uncomfortable with left shoulder pain  Chest: Clear to auscultation bilaterally anteriorly  Heart:  Regular rate and rhythm, +S1, S2, no appreciable murmur  Abdomen: Soft, nontender, BS +  MSK:  Dressing left shoulder, limited range of motion  Neurologic: Alert and oriented x4,  VITAL SIGNS: 24 HRS MIN & MAX LAST   Temp  Min: 97.9 °F (36.6 °C)  Max: 98.4 °F (36.9 °C) 98.3 °F (36.8 °C)   BP  Min: 115/73  Max: 131/84 129/80   Pulse  Min: 80  Max: 95  80   Resp  Min: 18  Max: 19 18   SpO2  Min: 94 %  Max: 96 % 96 %     I have reviewed the following labs:  Recent Labs   Lab 03/18/25  0831 03/19/25  0340 03/20/25  0520   WBC 15.60* 15.46* 17.31*    RBC 5.15 5.04 5.03   HGB 15.3 14.8 14.8   HCT 45.0 44.2 45.0   MCV 87.4 87.7 89.5   MCH 29.7 29.4 29.4   MCHC 34.0 33.5 32.9*   RDW 12.3 12.3 12.3   * 672* 706*   MPV 10.5* 10.3 10.4     Recent Labs   Lab 03/14/25  1955 03/15/25  0441 03/16/25  0533 03/17/25  0948 03/19/25  0340 03/20/25  0520    138 136  --  137 135*   K 4.1 4.0 4.4  --  5.4* 5.2*    102 102  --  105 102   CO2 24 23 22  --  23 23   BUN 18.2 15.6 13.9  --  19.4 17.9   CREATININE 0.78 0.68* 0.63* 0.68* 0.67* 0.67*   CALCIUM 9.3 9.0 8.8  --  8.9 9.1   MG  --  1.80  --   --  2.10 2.00   ALBUMIN 2.6* 2.7*  --   --   --   --    ALKPHOS 123 123  --   --   --   --    ALT 17 12  --   --   --   --    AST 10 7  --   --   --   --    BILITOT 0.2 0.4  --   --   --   --      Microbiology Results (last 7 days)       Procedure Component Value Units Date/Time    Blood Culture [[id number]]  (Normal) Collected: 03/17/25 1602    Order Status: Completed Specimen: Blood Updated: 03/19/25 2001     Blood Culture No Growth At 48 Hours    Blood Culture [[id number]]  (Normal) Collected: 03/17/25 1602    Order Status: Completed Specimen: Blood Updated: 03/19/25 2001     Blood Culture No Growth At 48 Hours    Wound Culture [[id number]]  (Abnormal)  (Susceptibility) Collected: 03/15/25 0144    Order Status: Completed Specimen: Abscess from Shoulder, Left Updated: 03/17/25 0655     Wound Culture Many Methicillin Sensitive Staphylococcus aureus             See below for Radiology    Intake/Output:No intake or output data in the 24 hours ending 03/20/25 0647         Assessment/Plan:  MSSA bacteremia likely secondary to left shoulder abscess  Left shoulder abscess status post I and D - 03/15--> MSSA  Essential hypertension  History of MI/CAD, CMO 45% with RWMA, DD  Tobacco use  Mild hyperkalemia     SHRUTHI today, TTE as such did not show any vegetation mildly reduced EF of 45%.  There is diastolic dysfunction.  Right ventricular systolic function is normal.  Mild  aortic stenosis, trace aortic regurg.    Continue with IV cefazolin 2 g IV Q 8 as per ID today is day 6 depending on SHRUTHI results either 4 weeks if negative or 6 week if there is a vegetation  Ortho on board, appreciate recs, continue IV antibiotics, No indication for operative intervention for septic arthritis of the left shoulder.   3/15- left shoulder abscess culture -MSSA  3/17- repeat bedside I and D per orthopedic  2/2 blood cultures - MSSA  3/17- Repeat Blood cx x 2- negative at 24 hours  Appropriate home medication for chronic medical condition has been resumed  White cell count is 17,000 today  Still has hyperkalemia give Lokelma again today    CM on board, , awaiting financial counselor to meet with him to see if he will be eligible for Medicaid.  If he is approved, he can go home with IV antibiotic otherwise plan to transfer to [facility] given patient's sister is an RN working with Dr. Rain- ID    VTE prophylaxis:  Lovenox    Patient condition:  Fair    Anticipated discharge and Disposition:  TBD, home with IV antibiotics if Medicaid approved versus transferred to [facility] where his sister works with ID department      All diagnosis and differential diagnosis have been reviewed; assessment and plan has been documented; I have personally reviewed the labs and test results that are presently available; I have reviewed the patients medication list; I have reviewed the consulting providers response and recommendations. I have reviewed or attempted to review medical records based upon their availability    All of the patient's questions have been  addressed and answered. Patient's is agreeable to the above stated plan. I will continue to monitor closely and make adjustments to medical management as needed.    Portions of this note dictated using EMR integrated voice recognition software, and may be subject to voice recognition errors not corrected at proofreading. Please contact writer for clarification if needed.    _____________________________________________________________________    Malnutrition Status:  Nutrition consulted. Most recent weight and BMI monitored-     Measurements:  Wt Readings from Last 1 Encounters:   03/15/25 74.8 kg (165 lb)   Body mass index is 27.46 kg/m².    Patient has been screened and assessed by RD.    Malnutrition Type:  Context:    Level:      Malnutrition Characteristic Summary:       Interventions/Recommendations (treatment strategy):        Scheduled Med:   aspirin  81 mg Oral Daily    ceFAZolin (Ancef) IV (PEDS and ADULTS)  2 g Intravenous Q8H    enoxparin  40 mg Subcutaneous Q24H (prophylaxis, 1700)    fenofibrate  145 mg Oral Daily    metoprolol succinate  25 mg Oral Daily    naloxegoL  25 mg Oral Daily    nicotine  1 patch Transdermal Daily      Continuous Infusions:     PRN Meds:  Current Facility-Administered Medications:     acetaminophen, 1,000 mg, Oral, Q6H PRN    aluminum-magnesium hydroxide-simethicone, 30 mL, Oral, QID PRN    bisacodyL, 10 mg, Rectal, Daily PRN    dextrose 50%, 12.5 g, Intravenous, PRN    dextrose 50%, 25 g, Intravenous, PRN    glucagon (human recombinant), 1 mg, Intramuscular, PRN    glucose, 16 g, Oral, PRN    glucose, 24 g, Oral, PRN    hydrOXYzine pamoate, 50 mg, Oral, Q6H PRN    melatonin, 6 mg, Oral, Nightly PRN    naloxone, 0.02 mg, Intravenous, PRN    ondansetron, 4 mg, Intravenous, Q4H PRN    oxyCODONE, 5 mg, Oral, Q4H PRN    prochlorperazine, 5 mg, Intravenous, Q6H PRN    senna-docusate 8.6-50 mg, 1 tablet, Oral, BID PRN    sodium chloride 0.9%, 10 mL, Intravenous, PRN     Radiology:  I have personally reviewed the following imaging and agree with the radiologist.     Echo    Left Ventricle: The left ventricle is normal in size. There is   concentric remodeling. Regional wall motion abnormalities present.   Inferior and inferior lateral walls are hypokinetic. There is mildly   reduced systolic function with a visually estimated ejection fraction of    45%. There is diastolic dysfunction.    Right Ventricle: The right ventricle is normal in size. Systolic   function is normal.    Aortic Valve: The aortic valve is a trileaflet valve. Moderately   calcified cusps. Mildly restricted motion. There is mild stenosis. Aortic   valve area by VTI is 1.4 cm². Aortic valve peak velocity is 2.5 m/s. Mean   gradient is 16 mmHg. The dimensionless index is 0.41. There is trace   aortic regurgitation.    Mitral Valve: There is no significant regurgitation.    Tricuspid Valve: There is no significant regurgitation.    Pericardium: There is no pericardial effusion.    No obvious evidence of valvular vegetations.    Can consider SHRUTHI to further evaluate for infective endocarditis if   clinically indicated.      Mirian Recio MD  Department of Hospital Medicine   [facility]   03/20/2025

## 2025-03-20 NOTE — TRANSFER OF CARE
"Anesthesia Transfer of Care Note    Patient: Kimani Redd    Procedure(s) Performed: * No procedures listed *    Patient location: Other: [facility]    Anesthesia Type: MAC    Transport from OR: Transported from OR on 6-10 L/min O2 by face mask with adequate spontaneous ventilation    Post pain: adequate analgesia    Post assessment: no apparent anesthetic complications and tolerated procedure well    Post vital signs: stable    Level of consciousness: responds to stimulation    Nausea/Vomiting: no nausea/vomiting    Complications: none    Transfer of care protocol was followedComments: Report given to Jodi cath lab RN.       Last vitals: Visit Vitals  /82   Pulse 87   Temp 36.7 °C (98.1 °F) (Oral)   Resp 18   Ht 5' 5" (1.651 m)   Wt 74.8 kg (165 lb)   SpO2 98%   BMI 27.46 kg/m²     " TRANSITIONAL CARE MANAGEMENT    Subjective      Here for TCM and AWE    Patient Care Team:  Nicky Anaya DO as PCP - General (Internal Medicine)  Sandi Ramirez      Patient ID: No is a 89 year old female and is unaccompanied today.    Discharge Summary  History of present illness/hospital course     History of present illness (Dr. 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  At baseline she is  very independent.  She is living alone.  She is still driving a car.  She does cook for herself as  well mainly frozen dinners.  She does not use a walker or cane.     She has not been feeling well for approximately 1 week.  Her illness initially started with a  headache that she said was very severe and then progressed to vomiting.  Her daughter provides most  of the history as the patient is fatigued and sleepy.  Her daughter says that the following night  the patient vomited repeatedly all night.  Then things seem to calm down a bit until approximately  2 days ago.  The patient did not want to get out of bed and she would not eat whenever her daughter  would go into check on her the patient would tell her daughter that she was \"just tired\" her  daughter noted that her blood pressure was very high.  When she awoke this morning and the patient  still did not want to get out of bed she brought her to the emergency department.     In the emergency room the patient's work-up was largely unremarkable.  She was admitted for  observation due to cough and low-grade temperature and underlying CLL with hypogammaglobulinemia.     Hospital course  The patient was ultimately admitted for generalized weakness and fatigue.  She also was complaining  of stomach upset and decreased oral intake.  The patient also was evaluated for low-grade  temperature reported in the emergency room department of  100.4.  The patient was placed on gentle  IV fluid hydration and her diuretics were held.  As the patient did not have any localizing  symptoms of infection she was not placed on antibiotics.  It was hard to determine what exactly was  dealing patient as she was not specific and just repeating she was feeling ill.  Lipase was init  ially mildly elevated however resolved the next day.  CT abdomen and pelvis did not show any acute  findings to explain her stomach upset.  The patient had improvement of mild nausea and stomach  upset and was able to tolerate her diet.  The patient was given MiraLAX for constipation and was  able to have a bowel movement.  The patient was also evaluated for elevated troponin which was  present on admission.  Cardiology evaluated the patient and agreed that further ischemic evaluation  was not warranted as her presentation was not consistent with acute coronary syndrome and the risk  of worsening kidney function with contrast dye for further ischemic evaluation was not indicated at  this time.  2D echocardiogram did not show any significant abnormal findings.  After IV fluids were  discontinued the patient had slight worsening of kidney function and therefore IV fluids were  restarted.  The patient had improvement of her kidney function following this.     The patient was also evaluated by psychiatry on this hospitalization as she made multiple suicidal  comments.  She was briefly placed under petition and certification.  After she was seen by  psychiatry she was cleared from further suicide precautions, inpatient psychiatric evaluation was  not required.     The patient was seen by PT OT.  No further needs were identified as she was back to baseline.  In  regards to low-grade temperature, etiology remains unclear.  Urine analysis did not suggest UTI.  Chest x-ray without obvious infiltrates.  Blood cultures negative so far.  CT of the abdomen and  pelvis did not show any acute or  significant findings to explain low-grade temperature.  The patient remained afebrile.  The patient did not require any antibiotics at this time.  The patient  was stable for discharge home with home health today.      Diagnostic exams  Laboratory exam: WBC 8.2, hemoglobin 11, hematocrit 32.8, platelet 426.  High-sensitivity troponin  peaked at 38.  Sodium 141 potassium 4.2 chloride 105 carbon dioxide 25 BUN 43 creatinine 2.12     Chest x-ray 7/23/2021  IMPRESSION:  Stable enlargement of the cardiac silhouette without acute pulmonary abnormality     CT head 7/23/2021  IMPRESSION:  1. No acute intracranial hemorrhage or acute territorial infarction.  2. White matter disease is most likely due to small vessel ischemic disease in a patient this age.     CT abdomen and pelvis 7/24/2021  IMPRESSION:  1. No definite acute appearing abnormality within the abdomen or pelvis.  2. Subtle cholelithiasis. No pericholecystic fluid identified.  3. Right-sided renal lesions appear more prominent in comparison with the prior exam but are small  and measure 9 mm and 11 mm. Solid renal lesion is not excluded. These are not adequately  characterized on this exam and will be better characterized by contrast enhanced CT or MRI of the  kidneys if clinically warranted.  4. Large stool burden is identified compatible with constipation. Diverticulosis noted without  acute diverticulitis.  5. Remainder of chronic findings are described above.     2D ECHO 7/26/21  Summary  Normal left ventricular size and systolic function. Ejection  fraction is estimated at 60%.  No evidence of any left ventricular regional wall motion  abnormalities.  Mild, concentric left ventricular hypertrophy.  Grade I diastolic dysfunction.  No significant valvular pathology is identified.  Pulmonary artery systolic pressure is estimated at 35-40 mmHg,  borderline pulmonary hypertension.     Discharge medications  1.  Amlodipine 10 mg daily (this is a new  medication)  2.  MiraLAX 17 g daily  3.  Acetaminophen-codeine 3.  300-30 mg 1 to 2 tablets every 4 hours as needed  4.  Allopurinol 100 mg twice a day  5.  Budesonide 3 mg twice a day  6.  Calcitriol 0.25 mcg daily  7.  Cyclobenzaprine 5 mg daily  8.  Escitalopram 20 mg daily  9.  Melatonin 20 mg daily  10.  Metoprolol succinate 25 mg daily  11.  Omeprazole 40 mg daily     Medications discontinued on this hospitalization  Furosemide     Discharge diagnosis  Nausea  -Resolved.  CT without any acute findings.     Low-grade temperature  -Etiology unclear.  -No signs or symptoms of infection  -UA does not suggest UTI.  Chest x-ray without obvious infiltrates. Blood cultures negative so far.     -CT abdomen and pelvis without any acute or significant findings to explain low-grade temperature.  -Resolved.     Elevated troponin  -Present on admission.  -Cardiology was on consultation.  2D echocardiogram without any significant abnormal findings.  -Elevated troponin may be related to kidney disease.  Cardiology did not recommend any further  ischemic evaluation as clinical presentation not consistent with acute coronary syndrome and the  patient has underlying chronic kidney disease and will not be able to tolerate contrast dye if  needed for angiogram.     Suicidal ideation  -Multiple comments of self-harm and suicidal ideation 7/24/2021.  -The patient was placed under petition and certification.  -The patient was seen by psychiatry and cleared and does not require further suicide precautions or  inpatient psychiatric evaluation.     Generalized weakness  -Improved and back to baseline.        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  -Home health has been ordered, repeat basic metabolic  panel will be obtained with home health to  monitor closely.           Chronic systolic congestive heart failure:  -Has had recovery of ejection fraction to 50 to 55% as of 9/2020  -Cardiology recommends discontinuation of diuretics completely at the time of discharge.        Presumptive coronary artery disease with ischemic cardiomyopathy:  -Continue medical management  -Patient has declined catheterization in the past due to kidney dysfunction.  Risk of dialysis.        Lymphocytic colitis:  -Continue budesonide  -No symptoms of diarrhea in fact the patient is constipated and has required MiraLAX.        Secondary hyperparathyroidism:  -Continue calcitriol        Hypertension:  -Continue metoprolol.  Amlodipine added on this admission.        COPD:  -Asymptomatic        Anxiety:  -Continue escitalopram        Gout:  -Continue allopurinol        RIGHT SIDED RENAL LESION  -Slight increase in size on most recent imaging study.  -Continue serial outpatient follow-up to ensure stability.     Follow-up:  -The patient will follow up with Dr. Héctor Duncan from nephrology.  The [facility] ER appointment hotline has been called to help make arrangements for these appointments.     I have discussed above findings and plan in detail with the patient as well as her daughter  Amber over the phone, all questions were answered.  I did spend greater than 30 minutes to  prepare this discharge.      HYPERTENSION: The problem has been present for years.  Course: Stable.   Patient states she has picked up new prescription, norvasc for BP, no SE      Continues to have severe OA pain, in shoulders, knees, back  Taking T#3, with very little relief, pain goes from 8/10 to 6/10  Would like to try a different medication for pain      Chief Complaint   Patient presents with   • Hospital F/U     The patient was discharged from the hospital on 7/28/2021 to her home. The Discharge Summary was reviewed. It documents that the  patient was hospitalized for low grade fever, generalized weakness.    Pertinent and un-finalized hospital performed diagnostic tests - were reviewed..    Pertinent un-finalized hospital lab tests - were reviewed.    Advanced Directives:  · Do Not Intubate/Do Not Resuscitate    DME/Assistive devices prescribed: None     Medications:  The discharge medication list was reviewed. Outpatient medications were updated today. She is fully compliant with the medication regimen prescribed at the time of discharge.    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), Carotid stenosis (1/11/2019), Cervical radiculitis (5/5/2016), Cervical spondylosis without myelopathy (9/16/2004), Chronic dryness of both eyes (12/20/2014), Chronic leukemia in remission (CMS/[facility]) (2/27/2001), CKD (chronic kidney disease) (9/9/2016), Constipation (6/3/2015), COPD (chronic obstructive pulmonary disease) (CMS/[facility]) (5/24/2011), DDD (degenerative disc disease), lumbar (5/5/2016), Diverticulosis of colon (7/30/2013), Elevated BP without diagnosis of hypertension, Esophagitis (1/11/2019), Essential hypertension (9/23/2009), Gastritis, Generalized anxiety disorder (1/2/2014), Gout, Herpes zoster dermatitis of eyelid (7/13/2007), Hypogammaglobulinemia (CMS/[facility]) (6/12/2012), Impingement syndrome of shoulder (7/1/2010), LPRD (laryngopharyngeal reflux disease) (1/11/2019), Lumbosacral spondylosis without myelopathy (7/17/2015), Microscopic colitis, Mixed hyperlipidemia (4/12/2018), Nephritis and nephropathy, with membranoproliferative glomerulonephritis (3/9/2005), Nephritis and nephropathy, with pathological lesion in kidney (9/23/2009), Osteoarthritis (6/26/2009), Other specified leukemias not having achieved remission (CMS/[facility]) (3/18/2019), Pain of both shoulder joints (5/5/2016), Presbyopia (12/12/2016), Regular astigmatism of both eyes (12/12/2016), 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  No has a past surgical history that includes Lymph node biopsy; Renal Biopsy; bone marrow biopsy; appendectomy; total abdom hysterectomy; Colonoscopy; Cataract extraction, bilateral; incis/drain thigh/knee abscess,deep; flexible sigmoidoscopy bx; and Permacath.  Her family history includes Hyperlipidemia in her father and mother.  No reports that she has been smoking cigarettes. She has never used smokeless tobacco. She reports current alcohol use. She reports that she does not use drugs.  No has a current medication list which includes the following prescription(s): cyclobenzaprine, budesonide, metoprolol succinate, calcitriol, allopurinol, ondansetron, naloxone, omeprazole, prochlorperazine, escitalopram, fluticasone, amlodipine, and oxycodone-acetaminophen.  No is allergic to gabapentin, hydrocodone-acetaminophen, lisinopril, sulfa antibiotics, and trazodone.      Eye Problem(s):negative  ENT Problem(s):negative  Cardiovascular problem(s):negative  Respiratory problem(s):negative  Gastro-intestinal problem(s):nausea  Genito-urinary problem(s):negative  Musculoskeletal problem(s):back pain and arthritis  Integumentary problem(s):negative  Neurological problem(s):negative  Psychiatric problem(s):depression  Endocrine problem(s):negative  Hematologic and/or Lymphatic problem(s):CLL    Objective   Visit Vitals  /64   Pulse 60   Temp 98 °F (36.7 °C)   Ht 4' 10\" (1.473 m)   Wt 46.7 kg (103 lb)   SpO2 97%   BMI 21.53 kg/m²     General: Not in apparent distress, alert, oriented x3.  Cardiovascular: Regular rate and rhythm, S1-S2 is normal Respiratory: Clear to auscultation bilaterally, no wheezes, no rhonchi's, no  rales.  Gastrointestinal: Abdomen is soft, mild tenderness in lower abdomen without any rebound or  guarding, nondistended.  No gross organomegaly.  Extremities: No peripheral edema.  No calf tenderness.  Peripheral pulses are intact.  Neurological: No gross deficits.      Assessment        Nausea  -Resolved.  CT reviewed with patient.     Low-grade temperature  resolved  -No signs or symptoms of currentinfection  -UA does not suggest UTI.  Chest x-ray without obvious infiltrates. Blood cultures negative so far.   -CT abdomen and pelvis without any acute or significant findings to explain low-grade temperature.  -Resolved.         Generalized weakness  -Improved and back to baseline.        Constipation  -MiraLAX as needed, but refuses to take this, can not tolerate it        CLL with hypogammaglobulinemia:  -Receiving monthly infusions of IVIG, continue follow up with Nora        Hypertension with chronic kidney disease   stable BP, at goal, 140/90 or less  secondary to membranoproliferative glomerulonephritis:  -Creatinine has returned back to baseline of 2 with IV fluids.  Continue follow up with Sandi  very important to avoid daily use of nsaids ( alleve products, ibuprofen products, advil, motrin, naprosyn)    continue norvasc 10mg   Compliant with medications, no side effects        Chronic systolic congestive heart failure:  No signs of fluid overload  Echo reviewed  -pt has stopped lasix       major depression  -Continue escitalopram 20mg   Compliant with medications, no side effects        RIGHT SIDED RENAL LESION  -Slight increase in size on most recent imaging study.  asymptomatic  Will continue to monitor        Medicare annual wellness visit, subsequent  Comment: see note    Chronic pain syndrome due to OA  Comment:stop T#3  Plan: oxyCODONE-acetaminophen (PERCOCET) 5-325 MG per        tablet        Use percocet bid  Call with update        Disease/condition/illness specific self-management education  was provided to the patient. Substantive elements include:  Medication and follow up                                                             Patient adherence to her treatment plan was assessed. She is fully compliant with the entire discharge treatment plan.      No Jo is a 89 year old female here for Medicare Subsequent Wellness Visit.   No Jo  is accompanied by: herself.  Date of initial or most recent subsequent wellness visit:  2020    Medicare Health Risk Assessment    Demographics:  Name: No Jo  Age: 89 year old  Gender: female   Race:       Premature CAD: none  Malignancies: none      Risk factors for conditions for which interventions are recommended:  See above      Current suppliers of other medical goods and services:  [employer]    Functional ability and level of safety:  good    Evidence of cognitive impairment:   none    @ZVS@   BMI: Body mass index is 21.53 kg/m².       Advice/referrals for health education/preventive counseling services or programs:  See below    Screening schedule/checklist for next 5-10 years:  Health Maintenance Due   Topic Date Due   • DTaP/Tdap/Td Vaccine (1 - Tdap) Never done   • Shingles Vaccine (1 of 2) Never done   • Colonoscopy Risk  07/23/2017   • Diabetes A1C  09/18/2019   • Diabetes Foot Exam  09/17/2020   • Medicare Wellness Visit  07/31/2021   • Influenza Vaccine (1) 08/01/2021

## 2025-03-20 NOTE — PROGRESS NOTES
Tele-Infectious Diseases Follow-Up       Patient Name: Kimani Redd  Patient : 1965  Age/Sex: 59 y.o. male  Room/Bed: 536/536 A  Admission Date/Time: 3/15/2025 12:35 AM    Date: 3/20/2025  Time: 8:30 AM    Assessment:     Kimani Redd is a 59 y.o. male with:  MSSA bacteremia:  Source unclear  Left shoulder abscess s/p I and D at bedside 3/15 and 3/17, no cultures obtained  Leukocytosis, 2/2 above, Improving    I explained the logistics of home health/IV antibiotic to patient and his wife at bedside.  I explained to him that we are still waiting for repeat blood cultures to document sterilization before placing any long-term line.  He continues to have pain in his shoulder.  He reports pain 7/10 in severity during my evaluation.  He said the pain improves after he takes Norco did not seem to get worse again when the medication weans off.  I would like to obtain MRI to definitely rule out deep infection as this will help me decide about length of antibiotic treatment.    Plan:     Okay to place PICC line from ID standpoint  Continue IV cefazolin 2 g every 8 hours  Plan for SHRUTHI  today  Obtain left shoulder MRI  Anticipating 4 weeks of IV antibiotic from first negative blood culture( 3/17-), 6 weeks if he has evidence of metastatic disease  Will make follow-up appointment ID clinic  Need to follow CBC and BMP weekly    All questions and concerns addressed with patient and understands and agrees with plan.     Thank you very much for allowing me to participate in the care of this patient.      ID will continue to follow. Please page with questions.    Diana Fraire MD   Attending, Infectious Disease     Reason for follow-up:      MSSA bacteremia    Summary:     Kimani Redd is a 59 y.o. male with a history significant for hypertension, tobacco use who was admitted on 3/15/2025 with left shoulder pain. Patient initially went to [facility] with left shoulder pain. There was a concern for  possible septic arthritis. Patient reports no injury or trauma.  He has been having pain in his left shoulder for the last week or so. Patient received a dose of vancomycin/Zosyn at the outside hospital and then transferred here for further evaluation.  His laboratory workup was remarkable for white cell count of 25,000.  He underwent CT of his left shoulder which was unremarkable. He was seen by Orthopedic surgery team. He underwent I&D at bedside. He had 2 sets of blood culture obtained which are growing methicillin sensitive Staphylococcus aureus. He underwent transthoracic echo which showed no clear vegetations.  He noted to have purulent drainage from his left shoulder on  and underwent I&D at bedside.  Repeated blood culture from 3/17 remains negative at  48 hours.    Antimicrobial history:      Zosyn 3/14-3/15  Vancomycin 3/14-3/17   IV cefazolin 3/17-    24 hours events:      No acute events overnight   Remains hemodynamically stable   WBC up to 17.31    Subjective     Patient seen and examined, chart reviewed.  Patient said that he continues to have shoulder pain.     Review of Symptoms:     Patient reports no nausea, vomiting, diarrhea, cough, dyspnea, chest pain or palpitations    Objective     Vital signs  Vitals:    25 0313 25 0400 25 0512 25 0742   BP: 129/80   133/82   Pulse: 80   87   Resp:  19 18    Temp: 98.3 °F (36.8 °C)   98.1 °F (36.7 °C)   TempSrc: Oral   Oral   SpO2: 96%   98%   Weight:       Height:          Temp (24hrs), Av.2 °F (36.8 °C), Min:97.9 °F (36.6 °C), Max:98.4 °F (36.9 °C)      Physical exam:  GEN: Awake, resting comfortably  EYES: EOMI, no scleral icterus  HENT: MMM. No oral lesions. Fair dentition.  NECK: Supple, no cervical lymphadenopathy or meningismus.   CARDIO: RRR, no murmur.  PULM/CHEST: CTAB. No increased work of breathing  ABD: Normal bowel sounds. Soft, not tender or distended. No HSM appreciated.  MSK: no obvious effusion, swelling,  increased warmth, or erythema of major joints. No pedal edema.  SKIN: No rashes. No stigmata of endocarditis.  NEURO: AOx3. Speech fluent. Face symmetric.  PSYCH: Mood appropriate    No intake or output data in the 24 hours ending 03/20/25 0812       Diagnostic Test     CBC, INR  Recent Labs   Lab 03/16/25  0533 03/17/25  0756 03/18/25  0831 03/19/25  0340 03/20/25  0520   WBC 25.01* 21.70* 15.60* 15.46* 17.31*   HGB 14.8 14.8 15.3 14.8 14.8   * 608* 648* 672* 706*       BMP  Recent Labs   Lab 03/14/25  1955 03/15/25  0441 03/16/25  0533 03/17/25  0948 03/19/25  0340 03/20/25  0520    138 136  --  137 135*   K 4.1 4.0 4.4  --  5.4* 5.2*    102 102  --  105 102   CO2 24 23 22  --  23 23   BUN 18.2 15.6 13.9  --  19.4 17.9   CREATININE 0.78 0.68* 0.63* 0.68* 0.67* 0.67*   CALCIUM 9.3 9.0 8.8  --  8.9 9.1   MG  --  1.80  --   --  2.10 2.00       No results found for this or any previous visit.    Recent Labs   Lab 03/18/25  0831 03/19/25  0340 03/20/25  0520   WBC 15.60* 15.46* 17.31*   HGB 15.3 14.8 14.8   HCT 45.0 44.2 45.0   * 672* 706*     Estimated Creatinine Clearance: 114.9 mL/min (A) (based on SCr of 0.67 mg/dL (L)).    Lab Results   Component Value Date    CREATININE 0.67 (L) 03/20/2025    CREATININE 0.67 (L) 03/19/2025    CREATININE 0.68 (L) 03/17/2025    ALKPHOS 123 03/15/2025    ALKPHOS 123 03/14/2025    ALKPHOS 125 09/07/2024        Microbiology and ID tests:     Microbiology Results (last 7 days)       Procedure Component Value Units Date/Time    Blood Culture [[id number]]  (Normal) Collected: 03/17/25 1602    Order Status: Completed Specimen: Blood Updated: 03/19/25 2001     Blood Culture No Growth At 48 Hours    Blood Culture [[id number]]  (Normal) Collected: 03/17/25 1602    Order Status: Completed Specimen: Blood Updated: 03/19/25 2001     Blood Culture No Growth At 48 Hours    Wound Culture [[id number]]  (Abnormal)  (Susceptibility) Collected: 03/15/25 0144    Order Status:  Completed Specimen: Abscess from Shoulder, Left Updated: 03/17/25 0646     Wound Culture Many Methicillin Sensitive Staphylococcus aureus              Medications   Inpatient  Scheduled Meds:   aspirin  81 mg Oral Daily    ceFAZolin (Ancef) IV (PEDS and ADULTS)  2 g Intravenous Q8H    enoxparin  40 mg Subcutaneous Q24H (prophylaxis, 1700)    fenofibrate  145 mg Oral Daily    metoprolol succinate  25 mg Oral Daily    naloxegoL  25 mg Oral Daily    nicotine  1 patch Transdermal Daily    sodium zirconium cyclosilicate  10 g Oral Once     Continuous Infusions:  PRN Meds:.  Current Facility-Administered Medications:     acetaminophen, 1,000 mg, Oral, Q6H PRN    aluminum-magnesium hydroxide-simethicone, 30 mL, Oral, QID PRN    bisacodyL, 10 mg, Rectal, Daily PRN    dextrose 50%, 12.5 g, Intravenous, PRN    dextrose 50%, 25 g, Intravenous, PRN    glucagon (human recombinant), 1 mg, Intramuscular, PRN    glucose, 16 g, Oral, PRN    glucose, 24 g, Oral, PRN    hydrOXYzine pamoate, 50 mg, Oral, Q6H PRN    melatonin, 6 mg, Oral, Nightly PRN    naloxone, 0.02 mg, Intravenous, PRN    ondansetron, 4 mg, Intravenous, Q4H PRN    oxyCODONE, 5 mg, Oral, Q4H PRN    prochlorperazine, 5 mg, Intravenous, Q6H PRN    senna-docusate 8.6-50 mg, 1 tablet, Oral, BID PRN    sodium chloride 0.9%, 10 mL, Intravenous, PRN    Home Meds  Current Outpatient Medications   Medication Instructions    aspirin (ECOTRIN) 81 mg, Daily    diclofenac (VOLTAREN) 50 mg, Oral, 3 times daily PRN    fenofibrate 160 mg, Oral, Daily    gabapentin (NEURONTIN) 300 mg, Oral, 3 times daily    LIDOcaine (LIDODERM) 5 % 1 patch, Transdermal, Daily, Remove & Discard patch within 12 hours or as directed by MD    metoprolol tartrate (LOPRESSOR) 12.5 mg, Oral, Daily    predniSONE (DELTASONE) 20 mg, Oral, Daily    simvastatin (ZOCOR) 40 mg, Oral, Nightly       Imaging (personally reviewed):     No orders to display         3/20/2025 8:30 AM    The attending portion of this  evaluation, treatment, and documentation was performed per Diana Fraire MD via Telemedicine AudioVisual using the secure [employer] software platform with 2 way audio/video. The provider was located off-site and the patient is located in the hospital. The aforementioned video software was utilized to document the relevant history and physical exam.     Critical care time spent: >35 minutes

## 2025-03-20 NOTE — PROCEDURES
"Kimani Redd is a 59 y.o. male patient.    Temp: 98 °F (36.7 °C) (03/20/25 1100)  Pulse: 87 (03/20/25 1106)  Resp: 18 (03/20/25 1106)  BP: (!) 157/86 (03/20/25 1106)  SpO2: 97 % (03/20/25 1100)  Weight: 74.8 kg (165 lb) (03/15/25 1202)  Height: 5' 5" (165.1 cm) (03/15/25 1202)    PICC  Date/Time: 3/20/2025 12:53 PM  Performed by: Lakesha Stephens, RN  Consent Done: Yes  Time out: Immediately prior to procedure a time out was called to verify the correct patient, procedure, equipment, support staff and site/side marked as required  Indications: med administration and vascular access  Anesthesia: local infiltration  Local anesthetic: lidocaine 1% without epinephrine  Anesthetic Total (mL): 4  Preparation: skin prepped with ChloraPrep  Skin prep agent dried: skin prep agent completely dried prior to procedure  Sterile barriers: all five maximum sterile barriers used - cap, mask, sterile gown, sterile gloves, and large sterile sheet  Hand hygiene: hand hygiene performed prior to central venous catheter insertion  Location details: right basilic  Catheter type: double lumen  Catheter size: 5 Fr  Catheter Length: 39cm    Ultrasound guidance: yes  Vessel Caliber: medium and patent, compressibility normal  Needle advanced into vessel with real time Ultrasound guidance.  Guidewire confirmed in vessel.  Sterile sheath used.  Number of attempts: 1  Post-procedure: blood return through all ports, sterile dressing applied and chlorhexidine patch    Assessment: placement verified by x-ray  Complications: none          Lakesha Stephens RN  3/20/2025    "

## 2025-03-20 NOTE — PROGRESS NOTES
[facility] - 5th Floor Med Surg  Wound Care    Patient Name:  Kimani Redd   MRN:  [id number]  Date: 3/20/2025  Diagnosis: <principal problem not specified>    History:     Past Medical History:   Diagnosis Date    Kidney stone     passed 2 weeks ago       Social History[1]    Precautions:     Allergies as of 03/14/2025    (No Known Allergies)       WOC Assessment Details/Treatment     WOCN Follow up for Left shoulder. Attempted to see patient. Pt currently off unit in surgery. Continue current treatment recommendations. Wound care will follow up     03/20/2025         [1]   Social History  Socioeconomic History    Marital status:    Occupational History     Comment: employed   Tobacco Use    Smoking status: Every Day     Current packs/day: 0.50     Types: Cigarettes    Smokeless tobacco: Never   Substance and Sexual Activity    Alcohol use: Never    Drug use: Never    Sexual activity: Not Currently   Social History Narrative    ** Merged History Encounter **

## 2025-03-20 NOTE — PLAN OF CARE
Called financial counseling left VM message waiting for return call to check status of medicaid application. Noted not listed as medicaid pending

## 2025-03-20 NOTE — PROGRESS NOTES
"Patient Name: Kimani Redd  MRN: [id number]  Admission Date: 3/15/2025  Hospital Length of Stay: 5 days  Attending Provider: Carlos Murcia MD  Primary Care Provider: Jessi Leyva FNP    Subjective:     Principal Orthopedic Problem:  Left shoulder superficial abscess    Interval History:  Seen this morning. Rosalind improving,still with purulent drainage; surrounding erythema improved, wound care packing and changing dressing.     Review of patient's allergies indicates:  No Known Allergies    Current Facility-Administered Medications   Medication    acetaminophen tablet 1,000 mg    aluminum-magnesium hydroxide-simethicone 200-200-20 mg/5 mL suspension 30 mL    aspirin EC tablet 81 mg    bisacodyL suppository 10 mg    cefazolin (ANCEF) 2 gram in dextrose 5% 50 mL IVPB (premix)    dextrose 50% injection 12.5 g    dextrose 50% injection 25 g    enoxaparin injection 40 mg    fenofibrate tablet 145 mg    glucagon (human recombinant) injection 1 mg    glucose chewable tablet 16 g    glucose chewable tablet 24 g    hydrOXYzine pamoate capsule 50 mg    melatonin tablet 6 mg    metoprolol succinate (TOPROL-XL) 24 hr tablet 25 mg    naloxegoL (MOVANTIK) tablet 25 mg    naloxone 0.4 mg/mL injection 0.02 mg    nicotine 14 mg/24 hr 1 patch    ondansetron injection 4 mg    oxyCODONE immediate release tablet 5 mg    prochlorperazine injection Soln 5 mg    senna-docusate 8.6-50 mg per tablet 1 tablet    sodium chloride 0.9% flush 10 mL    sodium chloride 0.9% flush 10 mL     Objective:     Vital Signs (Most Recent):  Temp: 98 °F (36.7 °C) (03/20/25 1100)  Pulse: 87 (03/20/25 1106)  Resp: 18 (03/20/25 1106)  BP: (!) 157/86 (03/20/25 1106)  SpO2: 97 % (03/20/25 1100) Vital Signs (24h Range):  Temp:  [97.9 °F (36.6 °C)-98.4 °F (36.9 °C)] 98 °F (36.7 °C)  Pulse:  [80-88] 87  Resp:  [18-19] 18  SpO2:  [94 %-98 %] 97 %  BP: (124-157)/(80-86) 157/86     Weight: 74.8 kg (165 lb)  Height: 5' 5" (165.1 cm)  Body mass index is 27.46 " kg/m².      Intake/Output Summary (Last 24 hours) at 3/20/2025 1339  Last data filed at 3/20/2025 1025  Gross per 24 hour   Intake 200 ml   Output --   Net 200 ml       Physical Exam:   Ortho/SPM Exam    General the patient is alert and oriented x3 no acute distress nontoxic-appearing appropriate affect.    Constitutional: Vital signs are examined and stable.  Resp: No signs of labored breathing    Musculoskeletal Exam:  Left shoulder: purulence from wound- surrounding erythema improving.  no clinical evidence of deep infection, no shoulder joint pain noted with ROM.  Able to perform active range motion    Diagnostic Findings:   Significant Labs: BMP:   Recent Labs   Lab 03/20/25  0520   *   K 5.2*      CO2 23   BUN 17.9   CREATININE 0.67*   CALCIUM 9.1   MG 2.00     CBC:   Recent Labs   Lab 03/19/25 0340 03/20/25  0520   WBC 15.46* 17.31*   HGB 14.8 14.8   HCT 44.2 45.0   * 706*     CMP:   Recent Labs   Lab 03/19/25  0340 03/20/25  0520    135*   K 5.4* 5.2*    102   CO2 23 23   BUN 19.4 17.9   CREATININE 0.67* 0.67*   CALCIUM 8.9 9.1     All pertinent labs within the past 24 hours have been reviewed.        Significant Imaging: I have reviewed and interpreted all pertinent imaging results/findings.     Assessment/Plan:       abscess was lanced at bedside, packing changed per wound care consult for continued management of shoulder wound   Had SHRUTHI today  He is on IV antibiotics- PICC placed today  No indication for operative intervention for septic arthritis of the shoulder.    Continue IV antibiotics per primary team.    Purulent drainage noted, will continue wound care for shoulder abscess  Call with questions or concerns.    Jodi Dennis FNP-C  Orthopedic Trauma Surgery  [facility] - 5th Floor Med Surg

## 2025-03-21 LAB
ALBUMIN SERPL-MCNC: 2.7 G/DL (ref 3.5–5)
ALBUMIN/GLOB SERPL: 0.6 RATIO (ref 1.1–2)
ALP SERPL-CCNC: 121 UNIT/L (ref 40–150)
ALT SERPL-CCNC: 16 UNIT/L (ref 0–55)
ANION GAP SERPL CALC-SCNC: 8 MEQ/L
AST SERPL-CCNC: 14 UNIT/L (ref 11–45)
BASOPHILS # BLD AUTO: 0.06 X10(3)/MCL
BASOPHILS NFR BLD AUTO: 0.4 %
BILIRUB SERPL-MCNC: 0.3 MG/DL
BUN SERPL-MCNC: 17.7 MG/DL (ref 8.4–25.7)
CALCIUM SERPL-MCNC: 9.1 MG/DL (ref 8.4–10.2)
CHLORIDE SERPL-SCNC: 102 MMOL/L (ref 98–107)
CO2 SERPL-SCNC: 27 MMOL/L (ref 22–29)
CREAT SERPL-MCNC: 0.7 MG/DL (ref 0.72–1.25)
CREAT/UREA NIT SERPL: 25
EOSINOPHIL # BLD AUTO: 0.26 X10(3)/MCL (ref 0–0.9)
EOSINOPHIL NFR BLD AUTO: 1.7 %
ERYTHROCYTE [DISTWIDTH] IN BLOOD BY AUTOMATED COUNT: 12.1 % (ref 11.5–17)
GFR SERPLBLD CREATININE-BSD FMLA CKD-EPI: >60 ML/MIN/1.73/M2
GLOBULIN SER-MCNC: 4.7 GM/DL (ref 2.4–3.5)
GLUCOSE SERPL-MCNC: 113 MG/DL (ref 74–100)
HCT VFR BLD AUTO: 45.5 % (ref 42–52)
HGB BLD-MCNC: 15.2 G/DL (ref 14–18)
IMM GRANULOCYTES # BLD AUTO: 0.17 X10(3)/MCL (ref 0–0.04)
IMM GRANULOCYTES NFR BLD AUTO: 1.1 %
LYMPHOCYTES # BLD AUTO: 3.72 X10(3)/MCL (ref 0.6–4.6)
LYMPHOCYTES NFR BLD AUTO: 23.9 %
MAGNESIUM SERPL-MCNC: 2 MG/DL (ref 1.6–2.6)
MCH RBC QN AUTO: 29.7 PG (ref 27–31)
MCHC RBC AUTO-ENTMCNC: 33.4 G/DL (ref 33–36)
MCV RBC AUTO: 89 FL (ref 80–94)
MONOCYTES # BLD AUTO: 1.4 X10(3)/MCL (ref 0.1–1.3)
MONOCYTES NFR BLD AUTO: 9 %
NEUTROPHILS # BLD AUTO: 9.94 X10(3)/MCL (ref 2.1–9.2)
NEUTROPHILS NFR BLD AUTO: 63.9 %
NRBC BLD AUTO-RTO: 0 %
PLATELET # BLD AUTO: 726 X10(3)/MCL (ref 130–400)
PMV BLD AUTO: 10 FL (ref 7.4–10.4)
POTASSIUM SERPL-SCNC: 4.1 MMOL/L (ref 3.5–5.1)
PROT SERPL-MCNC: 7.4 GM/DL (ref 6.4–8.3)
RBC # BLD AUTO: 5.11 X10(6)/MCL (ref 4.7–6.1)
SODIUM SERPL-SCNC: 137 MMOL/L (ref 136–145)
WBC # BLD AUTO: 15.55 X10(3)/MCL (ref 4.5–11.5)

## 2025-03-21 PROCEDURE — 63600175 PHARM REV CODE 636 W HCPCS: Performed by: INTERNAL MEDICINE

## 2025-03-21 PROCEDURE — 25000003 PHARM REV CODE 250: Performed by: STUDENT IN AN ORGANIZED HEALTH CARE EDUCATION/TRAINING PROGRAM

## 2025-03-21 PROCEDURE — 83735 ASSAY OF MAGNESIUM: CPT | Performed by: NURSE PRACTITIONER

## 2025-03-21 PROCEDURE — 36415 COLL VENOUS BLD VENIPUNCTURE: CPT | Performed by: INTERNAL MEDICINE

## 2025-03-21 PROCEDURE — 85025 COMPLETE CBC W/AUTO DIFF WBC: CPT | Performed by: INTERNAL MEDICINE

## 2025-03-21 PROCEDURE — 25000003 PHARM REV CODE 250: Performed by: INTERNAL MEDICINE

## 2025-03-21 PROCEDURE — 25000003 PHARM REV CODE 250: Performed by: NURSE PRACTITIONER

## 2025-03-21 PROCEDURE — 36415 COLL VENOUS BLD VENIPUNCTURE: CPT | Performed by: NURSE PRACTITIONER

## 2025-03-21 PROCEDURE — S4991 NICOTINE PATCH NONLEGEND: HCPCS | Performed by: STUDENT IN AN ORGANIZED HEALTH CARE EDUCATION/TRAINING PROGRAM

## 2025-03-21 PROCEDURE — 21400001 HC TELEMETRY ROOM

## 2025-03-21 PROCEDURE — 80053 COMPREHEN METABOLIC PANEL: CPT | Performed by: NURSE PRACTITIONER

## 2025-03-21 PROCEDURE — 63600175 PHARM REV CODE 636 W HCPCS: Performed by: STUDENT IN AN ORGANIZED HEALTH CARE EDUCATION/TRAINING PROGRAM

## 2025-03-21 RX ADMIN — ACETAMINOPHEN 1000 MG: 500 TABLET ORAL at 11:03

## 2025-03-21 RX ADMIN — HYDROXYZINE PAMOATE 50 MG: 50 CAPSULE ORAL at 08:03

## 2025-03-21 RX ADMIN — Medication 6 MG: at 08:03

## 2025-03-21 RX ADMIN — NALOXEGOL OXALATE 25 MG: 25 TABLET, FILM COATED ORAL at 08:03

## 2025-03-21 RX ADMIN — OXYCODONE HYDROCHLORIDE 5 MG: 5 TABLET ORAL at 08:03

## 2025-03-21 RX ADMIN — HYDROXYZINE PAMOATE 50 MG: 50 CAPSULE ORAL at 05:03

## 2025-03-21 RX ADMIN — OXYCODONE HYDROCHLORIDE 5 MG: 5 TABLET ORAL at 04:03

## 2025-03-21 RX ADMIN — NICOTINE 1 PATCH: 14 PATCH TRANSDERMAL at 08:03

## 2025-03-21 RX ADMIN — HYDROXYZINE PAMOATE 50 MG: 50 CAPSULE ORAL at 02:03

## 2025-03-21 RX ADMIN — ASPIRIN 81 MG: 81 TABLET, COATED ORAL at 08:03

## 2025-03-21 RX ADMIN — ENOXAPARIN SODIUM 40 MG: 40 INJECTION SUBCUTANEOUS at 04:03

## 2025-03-21 RX ADMIN — METOPROLOL SUCCINATE 25 MG: 25 TABLET, EXTENDED RELEASE ORAL at 08:03

## 2025-03-21 RX ADMIN — CEFAZOLIN SODIUM 2 G: 2 SOLUTION INTRAVENOUS at 12:03

## 2025-03-21 RX ADMIN — OXYCODONE HYDROCHLORIDE 5 MG: 5 TABLET ORAL at 12:03

## 2025-03-21 RX ADMIN — CEFAZOLIN SODIUM 2 G: 2 SOLUTION INTRAVENOUS at 08:03

## 2025-03-21 RX ADMIN — OXYCODONE HYDROCHLORIDE 5 MG: 5 TABLET ORAL at 03:03

## 2025-03-21 RX ADMIN — FENOFIBRATE 145 MG: 145 TABLET, FILM COATED ORAL at 08:03

## 2025-03-21 RX ADMIN — CEFAZOLIN SODIUM 2 G: 2 SOLUTION INTRAVENOUS at 03:03

## 2025-03-21 NOTE — ANESTHESIA POSTPROCEDURE EVALUATION
Anesthesia Post Evaluation    Patient: Kimani Redd    Procedure(s) Performed: * No procedures listed *    Final Anesthesia Type: general      Patient location during evaluation: PACU  Patient participation: Yes- Able to Participate  Level of consciousness: awake  Post-procedure vital signs: reviewed and stable  Pain management: adequate  Airway patency: patent    PONV status at discharge: vomiting (controlled) and nausea (controlled)  Anesthetic complications: no      Cardiovascular status: hemodynamically stable  Respiratory status: spontaneous ventilation and unassisted  Hydration status: euvolemic  Follow-up not needed.  Comments:                  Vitals Value Taken Time   /98 03/21/25 03:46   Temp 36.5 °C (97.7 °F) 03/21/25 03:46   Pulse 78 03/21/25 03:46   Resp 17 03/21/25 03:46   SpO2 98 % 03/21/25 03:46         No case tracking events are documented in the log.      Pain/Deon Score: Pain Rating Prior to Med Admin: 9 (3/21/2025  3:43 AM)  Pain Rating Post Med Admin: 3 (3/21/2025 12:37 AM)

## 2025-03-21 NOTE — PLAN OF CARE
Spoke to [place] his application was denied she got his last 4 check stubs and will ask for a review

## 2025-03-21 NOTE — PROGRESS NOTES
[facility] Hospital Medicine Progress Note        Chief Complaint: Inpatient Follow-up for left shoulder septic joint    HPI per admitting team: Kimani Redd is a 59 y.o. male who  has a past medical history of Kidney stone.. The patient presented to [facility] on 3/15/2025 with a primary complaint of left shoulder pain.  Patient arrived from an outlying facility for concern of septic arthritis and abscess over the left shoulder.  Patient has not had no recent injury to his left shoulder or prior surgery.  Patient noted a bump that got worsened along with redness and warmth.  Status post I&D done in the ER.  Patient notes improvement in his shoulder pain and symptoms after I&D.  Labs were significant for leukocytosis along with the elevated inflammatory markers.  Patient was admitted to us for further workup   Left shoulder abscess was I and D on March 15th and that showed MSSA, patient had repeat bedside I and D with Orthopedics blood culture also grew MSSA Infectious diseases following and we are continuing with cefazolin SHRUTHI done on March 20th was negative for any kind of vegetation patient is still having a lot of purulent drainage  Interval Hx:   Patient seen and examined this morning white cell count is slightly better still having shoulder pain   Case was discussed with patient's nurse and  on the floor.      Objective/physical exam:  General: In no acute distress, afebrile, uncomfortable with left shoulder pain  Chest: Clear to auscultation bilaterally anteriorly  Heart:  Regular rate and rhythm, +S1, S2, no appreciable murmur  Abdomen: Soft, nontender, BS +  MSK:  Dressing left shoulder, limited range of motion  Neurologic: Alert and oriented x4,  VITAL SIGNS: 24 HRS MIN & MAX LAST   Temp  Min: 97.7 °F (36.5 °C)  Max: 98.4 °F (36.9 °C) 98.2 °F (36.8 °C)   BP  Min: 112/66  Max: 157/86 119/81   Pulse  Min: 78  Max: 87  80   Resp  Min: 17  Max: 20 18   SpO2  Min: 94 %   Max: 98 % 96 %     I have reviewed the following labs:  Recent Labs   Lab 03/19/25 0340 03/20/25 0520 03/21/25 0345   WBC 15.46* 17.31* 15.55*   RBC 5.04 5.03 5.11   HGB 14.8 14.8 15.2   HCT 44.2 45.0 45.5   MCV 87.7 89.5 89.0   MCH 29.4 29.4 29.7   MCHC 33.5 32.9* 33.4   RDW 12.3 12.3 12.1   * 706* 726*   MPV 10.3 10.4 10.0     Recent Labs   Lab 03/14/25  1955 03/14/25  1955 03/15/25  0441 03/16/25  0533 03/19/25 0340 03/20/25 0520 03/21/25  0744     --  138   < > 137 135* 137   K 4.1  --  4.0   < > 5.4* 5.2* 4.1     --  102   < > 105 102 102   CO2 24  --  23   < > 23 23 27   BUN 18.2  --  15.6   < > 19.4 17.9 17.7   CREATININE 0.78  --  0.68*   < > 0.67* 0.67* 0.70*   CALCIUM 9.3  --  9.0   < > 8.9 9.1 9.1   MG  --    < > 1.80  --  2.10 2.00 2.00   ALBUMIN 2.6*  --  2.7*  --   --   --  2.7*   ALKPHOS 123  --  123  --   --   --  121   ALT 17  --  12  --   --   --  16   AST 10  --  7  --   --   --  14   BILITOT 0.2  --  0.4  --   --   --  0.3    < > = values in this interval not displayed.     Microbiology Results (last 7 days)       Procedure Component Value Units Date/Time    Blood Culture [[id number]]  (Normal) Collected: 03/17/25 1602    Order Status: Completed Specimen: Blood Updated: 03/20/25 2001     Blood Culture No Growth At 72 Hours    Blood Culture [[id number]]  (Normal) Collected: 03/17/25 1602    Order Status: Completed Specimen: Blood Updated: 03/20/25 2001     Blood Culture No Growth At 72 Hours    Wound Culture [[id number]]  (Abnormal)  (Susceptibility) Collected: 03/15/25 0144    Order Status: Completed Specimen: Abscess from Shoulder, Left Updated: 03/17/25 0655     Wound Culture Many Methicillin Sensitive Staphylococcus aureus             See below for Radiology    Intake/Output:  Intake/Output Summary (Last 24 hours) at 3/21/2025 0958  Last data filed at 3/20/2025 1025  Gross per 24 hour   Intake 200 ml   Output --   Net 200 ml            Assessment/Plan:  MSSA bacteremia  likely secondary to left shoulder abscess  Left shoulder abscess status post I and D - 03/15--> MSSA  Essential hypertension  History of MI/CAD, CMO 45% with RWMA, DD  Tobacco use  Mild hyperkalemia       MRI was ordered day before yesterday and they could not do it yesterday  Emigdio as such was negative for any vegetation mildly reduced EF of 45%.  There is diastolic dysfunction.  Right ventricular systolic function is normal.  Mild aortic stenosis, trace aortic regurg.    Continue with IV cefazolin 2 g IV Q 8 as per ID today is day 7 likely 4 weeks but will wake for id to see how long we need to continue the antibiotics  Ortho on board, appreciate recs, continue IV antibiotics, No indication for operative intervention for septic arthritis of the left shoulder, patient is still having a lot of purulent drainage  3/15- left shoulder abscess culture -MSSA  3/17- repeat bedside I and D per orthopedic  2/2 blood cultures - MSSA  3/17- Repeat Blood cx x 2- negative at 24 hours  Appropriate home medication for chronic medical condition has been resumed  White cell count is improving today      CM on board, , awaiting financial counselor to meet with him to see if he will be eligible for Medicaid.  If he is approved, he can go home with IV antibiotic otherwise plan to transfer to [facility] given patient's sister is an RN working with Dr. Rain- ID    VTE prophylaxis:  Lovenox    Patient condition:  Fair    Anticipated discharge and Disposition:  TBD, home with IV antibiotics if Medicaid approved versus transferred to [facility] where his sister works with ID department      All diagnosis and differential diagnosis have been reviewed; assessment and plan has been documented; I have personally reviewed the labs and test results that are presently available; I have reviewed the patients medication list; I have reviewed the consulting providers response and recommendations. I have reviewed or attempted to review medical records based upon  their availability    All of the patient's questions have been  addressed and answered. Patient's is agreeable to the above stated plan. I will continue to monitor closely and make adjustments to medical management as needed.    Portions of this note dictated using EMR integrated voice recognition software, and may be subject to voice recognition errors not corrected at proofreading. Please contact writer for clarification if needed.   _____________________________________________________________________    Malnutrition Status:  Nutrition consulted. Most recent weight and BMI monitored-     Measurements:  Wt Readings from Last 1 Encounters:   03/15/25 74.8 kg (165 lb)   Body mass index is 27.46 kg/m².    Patient has been screened and assessed by RD.    Malnutrition Type:  Context:    Level:      Malnutrition Characteristic Summary:       Interventions/Recommendations (treatment strategy):        Scheduled Med:   aspirin  81 mg Oral Daily    ceFAZolin (Ancef) IV (PEDS and ADULTS)  2 g Intravenous Q8H    enoxparin  40 mg Subcutaneous Q24H (prophylaxis, 1700)    fenofibrate  145 mg Oral Daily    metoprolol succinate  25 mg Oral Daily    naloxegoL  25 mg Oral Daily    nicotine  1 patch Transdermal Daily      Continuous Infusions:     PRN Meds:  Current Facility-Administered Medications:     acetaminophen, 1,000 mg, Oral, Q6H PRN    aluminum-magnesium hydroxide-simethicone, 30 mL, Oral, QID PRN    bisacodyL, 10 mg, Rectal, Daily PRN    dextrose 50%, 12.5 g, Intravenous, PRN    dextrose 50%, 25 g, Intravenous, PRN    glucagon (human recombinant), 1 mg, Intramuscular, PRN    glucose, 16 g, Oral, PRN    glucose, 24 g, Oral, PRN    hydrOXYzine pamoate, 50 mg, Oral, Q6H PRN    melatonin, 6 mg, Oral, Nightly PRN    naloxone, 0.02 mg, Intravenous, PRN    ondansetron, 4 mg, Intravenous, Q4H PRN    oxyCODONE, 5 mg, Oral, Q4H PRN    prochlorperazine, 5 mg, Intravenous, Q6H PRN    senna-docusate 8.6-50 mg, 1 tablet, Oral, BID PRN     sodium chloride 0.9%, 10 mL, Intravenous, PRN    Flushing PICC/Midline Protocol, , , Until Discontinued **AND** sodium chloride 0.9%, 10 mL, Intravenous, Q12H PRN     Radiology:  I have personally reviewed the following imaging and agree with the radiologist.     X-Ray Chest 1 View for Line/Tube Placement  Narrative: EXAMINATION:  XR CHEST 1 VIEW FOR LINE/TUBE PLACEMENT    CLINICAL HISTORY:  Abscess.PICC;    COMPARISON:  6 May 2019    FINDINGS:  Frontal view of the chest was obtained. Right PICC tip overlies the distal SVC.  The heart is not enlarged.  Lungs are clear.  There is no pneumothorax or significant effusion.  Impression: Right PICC tip overlies the distal SVC.    Electronically signed by: Pola Kerr  Date:    03/20/2025  Time:    13:17  Transesophageal echo (SHRUTHI)    Left Ventricle: The left ventricle is normal in size. Normal wall   thickness. There is normal systolic function with a visually estimated   ejection fraction of 55 - 60%.    Right Ventricle: The right ventricle is normal in size. Wall thickness   is normal. Systolic function is normal.    Left Atrium: There is no thrombus in the left atrial cavity.    Aortic Valve: The aortic valve is structurally normal. Mildly calcified   noncoronary cusp.    No evidence of infective endocarditis.      Mirian Recio MD  Department of Hospital Medicine   [facility]   03/21/2025

## 2025-03-21 NOTE — PLAN OF CARE
Spoke to Paige they have not been able to speak with patient as his request. Patient denies and states that she can come now. Notified Paige

## 2025-03-21 NOTE — PROGRESS NOTES
[facility] - 5th Floor Med Surg  Wound Care    Patient Name:  Kimani Redd   MRN:  [id number]  Date: 3/21/2025  Diagnosis: <principal problem not specified>    History:     Past Medical History:   Diagnosis Date    Kidney stone     passed 2 weeks ago       Social History[1]    Precautions:     Allergies as of 03/14/2025    (No Known Allergies)       WOC Assessment Details/Treatment        03/21/25 1220   WOCN Assessment   Visit Date 03/21/25   Visit Time 1220   Consult Type Follow Up   WO Speciality Wound   Intervention applied;chart review   Teaching on-going        Wound 03/15/25 0110 Abscess Left Shoulder   Date First Assessed/Time First Assessed: 03/15/25 0110   Primary Wound Type: Abscess  Side: Left  Location: Shoulder   Wound Image    Dressing Appearance Moist drainage   Drainage Amount Moderate   Drainage Characteristics/Odor Serosanguineous   Appearance Red;Gray;Yellow   Tissue loss description Full thickness   Black (%), Wound Tissue Color 0 %   Red (%), Wound Tissue Color 100 %   Yellow (%), Wound Tissue Color 0 %   Periwound Area Redness;Swelling   Wound Edges Irregular   Wound Length (cm) 3.5 cm   Wound Width (cm) 3.5 cm   Wound Depth (cm) 1 cm   Wound Volume (cm^3) 6.414 cm^3   Wound Surface Area (cm^2) 9.62 cm^2   Care Cleansed with:;Antimicrobial agent;Other (see comments)  (vashe)   Dressing Applied;Other (comment)  (vashe moisten 1/4 plain packing, gauze, abd, tape)   Packing packed with  (1/4 plain packing)   Packing Removed   (one strip)     WOCN follow up for left shoulder wound. Discussed POC with nurse. Family at bedside. Pt stated that he was anxious. Informed pt step by step on the care that was being done. Continue Current treatment recommendations Left Shoulder: removed dressing. Removed packing. Cleansed well with Vashe. Measurements obtained along with photos. Treatment performed: Packed lightly with vashe moistened 1/4 in gauze, covered with ABD, covered with tape. Pt  verbalized that he was no longer anxious. Pt had no further questions or concerns. Wound care to be performed daily. Wound care will follow up.   03/21/2025         [1]   Social History  Socioeconomic History    Marital status:    Occupational History     Comment: employed   Tobacco Use    Smoking status: Every Day     Current packs/day: 0.50     Types: Cigarettes    Smokeless tobacco: Never   Substance and Sexual Activity    Alcohol use: Never    Drug use: Never    Sexual activity: Not Currently   Social History Narrative    ** Merged History Encounter **

## 2025-03-21 NOTE — PROGRESS NOTES
Inpatient Nutrition Evaluation    Admit Date: 3/15/2025   Total duration of encounter: 6 days   Patient Age: 59 y.o.    Nutrition Recommendation/Prescription     Continue regular diet. Encouraged increased oral intake.  Terry (provides 90 kcal, 2.5 g protein per serving) BID to trial.   Obtain standing weight.     Nutrition Assessment     Chart Review    Reason Seen: length of stay    Malnutrition Screening Tool Results   Have you recently lost weight without trying?: No  Have you been eating poorly because of a decreased appetite?: No   MST Score: 0   Diagnosis:  MSSA bacteremia likely secondary to left shoulder abscess  Left shoulder abscess status post I and D - 03/15--> MSSA  Essential hypertension    Relevant Medical History: MI/CAD, CMO 45% with RWMA, DD     Scheduled Medications:  aspirin, 81 mg, Daily  ceFAZolin (Ancef) IV (PEDS and ADULTS), 2 g, Q8H  enoxparin, 40 mg, Q24H (prophylaxis, 1700)  fenofibrate, 145 mg, Daily  metoprolol succinate, 25 mg, Daily  naloxegoL, 25 mg, Daily  nicotine, 1 patch, Daily    Continuous Infusions:   PRN Medications:   Current Facility-Administered Medications:     acetaminophen, 1,000 mg, Oral, Q6H PRN    aluminum-magnesium hydroxide-simethicone, 30 mL, Oral, QID PRN    bisacodyL, 10 mg, Rectal, Daily PRN    dextrose 50%, 12.5 g, Intravenous, PRN    dextrose 50%, 25 g, Intravenous, PRN    glucagon (human recombinant), 1 mg, Intramuscular, PRN    glucose, 16 g, Oral, PRN    glucose, 24 g, Oral, PRN    hydrOXYzine pamoate, 50 mg, Oral, Q6H PRN    melatonin, 6 mg, Oral, Nightly PRN    naloxone, 0.02 mg, Intravenous, PRN    ondansetron, 4 mg, Intravenous, Q4H PRN    oxyCODONE, 5 mg, Oral, Q4H PRN    prochlorperazine, 5 mg, Intravenous, Q6H PRN    senna-docusate 8.6-50 mg, 1 tablet, Oral, BID PRN    sodium chloride 0.9%, 10 mL, Intravenous, PRN    Flushing PICC/Midline Protocol, , , Until Discontinued **AND** sodium chloride 0.9%, 10 mL, Intravenous, Q12H PRN    Recent Labs   Lab  03/14/25  1955 03/15/25  0441 03/16/25  0533 03/17/25  0756 03/17/25  0948 03/18/25  0831 03/19/25  0340 03/20/25  0520 03/21/25  0345 03/21/25  0744    138 136  --   --   --  137 135*  --  137   K 4.1 4.0 4.4  --   --   --  5.4* 5.2*  --  4.1   CALCIUM 9.3 9.0 8.8  --   --   --  8.9 9.1  --  9.1   MG  --  1.80  --   --   --   --  2.10 2.00  --  2.00    102 102  --   --   --  105 102  --  102   CO2 24 23 22  --   --   --  23 23  --  27   BUN 18.2 15.6 13.9  --   --   --  19.4 17.9  --  17.7   CREATININE 0.78 0.68* 0.63*  --  0.68*  --  0.67* 0.67*  --  0.70*   EGFRNORACEVR >60 >60 >60  --  >60  --  >60 >60  --  >60   GLUCOSE 125* 112* 101*  --   --   --  100 92  --  113*   BILITOT 0.2 0.4  --   --   --   --   --   --   --  0.3   ALKPHOS 123 123  --   --   --   --   --   --   --  121   ALT 17 12  --   --   --   --   --   --   --  16   AST 10 7  --   --   --   --   --   --   --  14   ALBUMIN 2.6* 2.7*  --   --   --   --   --   --   --  2.7*   .40*  --   --   --   --   --   --   --   --   --    WBC 25.52* 26.82  26.82* 25.01* 21.70*  --  15.60* 15.46* 17.31* 15.55*  --    HGB 14.2 14.3 14.8 14.8  --  15.3 14.8 14.8 15.2  --    HCT 42.7 42.8 44.6 44.9  --  45.0 44.2 45.0 45.5  --      Nutrition Orders:  Diet Adult Regular Standard Tray      Appetite/Oral Intake: good/50-75% of meals  Factors Affecting Nutritional Intake:  preferences; being in the hospital  Food/Oriental orthodox/Cultural Preferences: none reported  Food Allergies: none reported  Last Bowel Movement: 03/18/25  Wound(s):     Wound 03/15/25 0110 Abscess Left Shoulder-Tissue loss description: Full thickness     Comments    3/21/25 Reports decreased appetite and ~5lbs weight loss PTA s/t pain. Appetite has improved since admit and tolerating, just with decreased intake s/t preferences. Likes cajun/seasoned meals. Politely declined Boost oral supplement at this time. Will send Terry to trial for full thickness abscess on shoulder. UBW ranges from  "160-165lbs.     Anthropometrics    Height: 5' 5" (165.1 cm), Height Method: Stated  Last Weight: 74.8 kg (165 lb) (03/15/25 1202), Weight Method: Stated  BMI (Calculated): 27.5  BMI Classification: overweight (BMI 25-29.9)        Ideal Body Weight (IBW), Male: 136 lb     % Ideal Body Weight, Male (lb): 121.32 %                 Usual Body Weight (UBW), k.7 kg  % Usual Body Weight: 103.16     Usual Weight Provided By: patient    Wt Readings from Last 5 Encounters:   03/15/25 74.8 kg (165 lb)   25 74.8 kg (165 lb)   25 72.9 kg (160 lb 11.2 oz)   25 74.8 kg (165 lb)   25 74.8 kg (165 lb)     Weight Change(s) Since Admission:   Wt Readings from Last 1 Encounters:   03/15/25 1202 74.8 kg (165 lb)   03/15/25 0033 74.8 kg (165 lb)   Admit Weight: 74.8 kg (165 lb) (03/15/25 0033), Weight Method: Stated    Patient Education     Not applicable.    Nutrition Goals & Monitoring     Dietitian will monitor: energy intake    Nutrition Risk/Follow-Up: low (follow-up in 5-7 days)  Patients assigned 'low nutrition risk' status do not qualify for a full nutritional assessment but will be monitored and re-evaluated in a 5-7 day time period. Please consult if re-evaluation needed sooner.   "

## 2025-03-21 NOTE — PROGRESS NOTES
"Patient Name: Kimani Redd  MRN: [id number]  Admission Date: 3/15/2025  Hospital Length of Stay: 6 days  Attending Provider: Carlos Murcia MD  Primary Care Provider: Jessi Leyva FNP    Subjective:     Principal Orthopedic Problem:  Left shoulder superficial abscess    Interval History:  Seen this morning. Overall feels well, photos added to media. Still with purulent drainage and dressing changes with wound care. Photos added to media.       Review of patient's allergies indicates:  No Known Allergies    Current Facility-Administered Medications   Medication    acetaminophen tablet 1,000 mg    aluminum-magnesium hydroxide-simethicone 200-200-20 mg/5 mL suspension 30 mL    aspirin EC tablet 81 mg    bisacodyL suppository 10 mg    cefazolin (ANCEF) 2 gram in dextrose 5% 50 mL IVPB (premix)    dextrose 50% injection 12.5 g    dextrose 50% injection 25 g    enoxaparin injection 40 mg    fenofibrate tablet 145 mg    glucagon (human recombinant) injection 1 mg    glucose chewable tablet 16 g    glucose chewable tablet 24 g    hydrOXYzine pamoate capsule 50 mg    melatonin tablet 6 mg    metoprolol succinate (TOPROL-XL) 24 hr tablet 25 mg    naloxegoL (MOVANTIK) tablet 25 mg    naloxone 0.4 mg/mL injection 0.02 mg    nicotine 14 mg/24 hr 1 patch    ondansetron injection 4 mg    oxyCODONE immediate release tablet 5 mg    prochlorperazine injection Soln 5 mg    senna-docusate 8.6-50 mg per tablet 1 tablet    sodium chloride 0.9% flush 10 mL    sodium chloride 0.9% flush 10 mL     Objective:     Vital Signs (Most Recent):  Temp: 97.8 °F (36.6 °C) (03/21/25 1053)  Pulse: 76 (03/21/25 1053)  Resp: 20 (03/21/25 1053)  BP: 131/85 (03/21/25 1053)  SpO2: 98 % (03/21/25 1053) Vital Signs (24h Range):  Temp:  [97.7 °F (36.5 °C)-98.4 °F (36.9 °C)] 97.8 °F (36.6 °C)  Pulse:  [76-87] 76  Resp:  [17-20] 20  SpO2:  [94 %-98 %] 98 %  BP: (112-143)/(66-98) 131/85     Weight: 74.8 kg (165 lb)  Height: 5' 5" (165.1 cm)  Body mass index " is 27.46 kg/m².    No intake or output data in the 24 hours ending 03/21/25 1124      Physical Exam:   Ortho/SPM Exam    General the patient is alert and oriented x3 no acute distress nontoxic-appearing appropriate affect.    Constitutional: Vital signs are examined and stable.  Resp: No signs of labored breathing    Musculoskeletal Exam:  Left shoulder: purulence from wound- surrounding erythema improving.  no clinical evidence of deep infection, no shoulder joint pain noted with ROM.  Able to perform active range motion    Diagnostic Findings:   Significant Labs: BMP:   Recent Labs   Lab 03/21/25  0744      K 4.1      CO2 27   BUN 17.7   CREATININE 0.70*   CALCIUM 9.1   MG 2.00     CBC:   Recent Labs   Lab 03/20/25  0520 03/21/25  0345   WBC 17.31* 15.55*   HGB 14.8 15.2   HCT 45.0 45.5   * 726*     CMP:   Recent Labs   Lab 03/20/25  0520 03/21/25  0744   * 137   K 5.2* 4.1    102   CO2 23 27   BUN 17.9 17.7   CREATININE 0.67* 0.70*   CALCIUM 9.1 9.1   ALBUMIN  --  2.7*   BILITOT  --  0.3   ALKPHOS  --  121   AST  --  14   ALT  --  16     All pertinent labs within the past 24 hours have been reviewed.        Significant Imaging: I have reviewed and interpreted all pertinent imaging results/findings.     Assessment/Plan:       abscess was lanced at bedside, packing changed per wound care consult for continued management of shoulder wound   Had SHRUTHI   He is on IV antibiotics- PICC placed today  No indication for operative intervention for septic arthritis of the shoulder.    Continue IV antibiotics per primary team.    Purulent drainage noted, will continue wound care for shoulder abscess- consistent with healing abscess  Afebrile, WBC down trending  Call with questions or concerns.    Jodi Dennis FNP-C  Orthopedic Trauma Surgery  [facility] - 5th Floor Med Surg

## 2025-03-21 NOTE — PLAN OF CARE
Problem: Adult Inpatient Plan of Care  Goal: Plan of Care Review  Outcome: Progressing  Goal: Patient-Specific Goal (Individualized)  Outcome: Progressing  Goal: Absence of Hospital-Acquired Illness or Injury  Outcome: Progressing  Goal: Optimal Comfort and Wellbeing  Outcome: Progressing  Goal: Readiness for Transition of Care  Outcome: Progressing     Problem: Wound  Goal: Optimal Coping  Outcome: Progressing  Goal: Optimal Functional Ability  Outcome: Progressing  Goal: Absence of Infection Signs and Symptoms  Outcome: Progressing  Goal: Improved Oral Intake  Outcome: Progressing  Goal: Optimal Pain Control and Function  Outcome: Progressing  Goal: Skin Health and Integrity  Outcome: Progressing  Goal: Optimal Wound Healing  Outcome: Progressing     Problem: Infection  Goal: Absence of Infection Signs and Symptoms  Outcome: Progressing      No

## 2025-03-21 NOTE — PLAN OF CARE
Emailed financial counselor. Paige requested I call her. Called at number provided no answer had to leave VM

## 2025-03-22 LAB
BACTERIA BLD CULT: NORMAL
BACTERIA BLD CULT: NORMAL

## 2025-03-22 PROCEDURE — 25000003 PHARM REV CODE 250: Performed by: STUDENT IN AN ORGANIZED HEALTH CARE EDUCATION/TRAINING PROGRAM

## 2025-03-22 PROCEDURE — S4991 NICOTINE PATCH NONLEGEND: HCPCS | Performed by: STUDENT IN AN ORGANIZED HEALTH CARE EDUCATION/TRAINING PROGRAM

## 2025-03-22 PROCEDURE — 25000003 PHARM REV CODE 250: Performed by: INTERNAL MEDICINE

## 2025-03-22 PROCEDURE — 25000003 PHARM REV CODE 250: Performed by: NURSE PRACTITIONER

## 2025-03-22 PROCEDURE — 21400001 HC TELEMETRY ROOM

## 2025-03-22 PROCEDURE — 63600175 PHARM REV CODE 636 W HCPCS: Performed by: INTERNAL MEDICINE

## 2025-03-22 PROCEDURE — 63600175 PHARM REV CODE 636 W HCPCS: Performed by: STUDENT IN AN ORGANIZED HEALTH CARE EDUCATION/TRAINING PROGRAM

## 2025-03-22 RX ADMIN — ASPIRIN 81 MG: 81 TABLET, COATED ORAL at 09:03

## 2025-03-22 RX ADMIN — NICOTINE 1 PATCH: 14 PATCH TRANSDERMAL at 09:03

## 2025-03-22 RX ADMIN — FENOFIBRATE 145 MG: 145 TABLET, FILM COATED ORAL at 09:03

## 2025-03-22 RX ADMIN — HYDROXYZINE PAMOATE 50 MG: 50 CAPSULE ORAL at 02:03

## 2025-03-22 RX ADMIN — ENOXAPARIN SODIUM 40 MG: 40 INJECTION SUBCUTANEOUS at 06:03

## 2025-03-22 RX ADMIN — OXYCODONE HYDROCHLORIDE 5 MG: 5 TABLET ORAL at 09:03

## 2025-03-22 RX ADMIN — OXYCODONE HYDROCHLORIDE 5 MG: 5 TABLET ORAL at 01:03

## 2025-03-22 RX ADMIN — OXYCODONE HYDROCHLORIDE 5 MG: 5 TABLET ORAL at 02:03

## 2025-03-22 RX ADMIN — CEFAZOLIN SODIUM 2 G: 2 SOLUTION INTRAVENOUS at 05:03

## 2025-03-22 RX ADMIN — ACETAMINOPHEN 1000 MG: 500 TABLET ORAL at 08:03

## 2025-03-22 RX ADMIN — CEFAZOLIN SODIUM 2 G: 2 SOLUTION INTRAVENOUS at 11:03

## 2025-03-22 RX ADMIN — HYDROXYZINE PAMOATE 50 MG: 50 CAPSULE ORAL at 06:03

## 2025-03-22 RX ADMIN — METOPROLOL SUCCINATE 25 MG: 25 TABLET, EXTENDED RELEASE ORAL at 09:03

## 2025-03-22 RX ADMIN — NALOXEGOL OXALATE 25 MG: 25 TABLET, FILM COATED ORAL at 09:03

## 2025-03-22 RX ADMIN — OXYCODONE HYDROCHLORIDE 5 MG: 5 TABLET ORAL at 06:03

## 2025-03-22 RX ADMIN — CEFAZOLIN SODIUM 2 G: 2 SOLUTION INTRAVENOUS at 08:03

## 2025-03-22 RX ADMIN — HYDROXYZINE PAMOATE 50 MG: 50 CAPSULE ORAL at 11:03

## 2025-03-22 RX ADMIN — OXYCODONE HYDROCHLORIDE 5 MG: 5 TABLET ORAL at 05:03

## 2025-03-22 RX ADMIN — Medication 6 MG: at 08:03

## 2025-03-22 NOTE — PROGRESS NOTES
Tele-Infectious Diseases Follow-Up       Patient Name: Kimani Redd  Patient : 1965  Age/Sex: 59 y.o. male  Room/Bed: 536/536 A  Admission Date/Time: 3/15/2025 12:35 AM    Date: 3/22/2025    Assessment:     Kimani Redd is a 59 y.o. male with:  MSSA bacteremia:  Source unclear  Left shoulder abscess s/p I and D at bedside 3/15 and 3/17, no cultures obtained.  MRI showed AC joint septic arthritis with osteomyelitis of the distal clavicle and acromion   Leukocytosis, 2/2 above, Improving    MRI from yesterday came back positive for septic arthritis and osteomyelitis.  Patient continues to have pain in his shoulder.  I reviewed the findings with him in details.  I explained to him that septic arthritis/osteomyelitis usually surgical disease.  We will wait for orthopedic evaluation on Monday.    Plan:     Continue IV cefazolin 2 g every 8 hours  Recommend 6 weeks total of IV antibiotic  Will make follow-up appointment ID clinic  I would recommend official washout of his left shoulder/bone for source control    All questions and concerns addressed with patient and understands and agrees with plan.     Thank you very much for allowing me to participate in the care of this patient.      ID will continue to follow. Please page with questions.    Diana Fraire MD   Attending, Infectious Disease     Reason for follow-up:      MSSA bacteremia    Summary:     Kimani Redd is a 59 y.o. male with a history significant for hypertension, tobacco use who was admitted on 3/15/2025 with left shoulder pain. Patient initially went to [facility] with left shoulder pain. There was a concern for possible septic arthritis. Patient reports no injury or trauma.  He has been having pain in his left shoulder for the last week or so. Patient received a dose of vancomycin/Zosyn at the outside hospital and then transferred here for further evaluation.  His laboratory workup was remarkable for white cell count of 25,000.  He  underwent CT of his left shoulder which was unremarkable. He was seen by Orthopedic surgery team. He underwent I&D at bedside. He had 2 sets of blood culture obtained which are growing methicillin sensitive Staphylococcus aureus. He underwent transthoracic echo which showed no clear vegetations.  He noted to have purulent drainage from his left shoulder on  and underwent I&D at bedside.  Repeated blood culture from 3/17 remains negative at  48 hours. Patient underwent left shoulder MRI on 3/20 which showed AC joint septic arthritis with osteomyelitis of the distal clavicle and acromion     Antimicrobial history:      Zosyn 3/14-3/15  Vancomycin 3/14-3/17   IV cefazolin 3/17-    24 hours events:      No acute events overnight   Remains hemodynamically stable    Subjective     Patient seen and examined, chart reviewed.  Patient said that he continues to have shoulder pain.     Review of Symptoms:     Patient reports no nausea, vomiting, diarrhea, cough, dyspnea, chest pain or palpitations    Objective     Vital signs  Vitals:    25 2124 25 0135 25 0531 25 0532   BP: 113/70  129/84    Patient Position:       Pulse: 81  80    Resp:    Temp: 98.1 °F (36.7 °C)  97.9 °F (36.6 °C)    TempSrc: Oral  Oral    SpO2: 95%  98%    Weight:       Height:          Temp (24hrs), Av.9 °F (36.6 °C), Min:97.5 °F (36.4 °C), Max:98.2 °F (36.8 °C)      Physical exam:  GEN: Awake, resting comfortably  EYES: EOMI, no scleral icterus  HENT: MMM. No oral lesions. Fair dentition.  NECK: Supple, no cervical lymphadenopathy or meningismus.   CARDIO: RRR, no murmur.  PULM/CHEST: CTAB. No increased work of breathing  ABD: Normal bowel sounds. Soft, not tender or distended. No HSM appreciated.  MSK: no obvious effusion, swelling, increased warmth, or erythema of major joints. No pedal edema.  SKIN: No rashes. No stigmata of endocarditis.  NEURO: AOx3. Speech fluent. Face symmetric.  PSYCH: Mood  appropriate         Diagnostic Test     CBC, INR  Recent Labs   Lab 03/17/25  0756 03/18/25  0831 03/19/25  0340 03/20/25  0520 03/21/25  0345   WBC 21.70* 15.60* 15.46* 17.31* 15.55*   HGB 14.8 15.3 14.8 14.8 15.2   * 648* 672* 706* 726*       BMP  Recent Labs   Lab 03/16/25  0533 03/17/25  0948 03/19/25  0340 03/20/25  0520 03/21/25  0744     --  137 135* 137   K 4.4  --  5.4* 5.2* 4.1     --  105 102 102   CO2 22  --  23 23 27   BUN 13.9  --  19.4 17.9 17.7   CREATININE 0.63* 0.68* 0.67* 0.67* 0.70*   CALCIUM 8.8  --  8.9 9.1 9.1   MG  --   --  2.10 2.00 2.00       No results found for this or any previous visit.    Recent Labs   Lab 03/19/25  0340 03/20/25  0520 03/21/25  0345   WBC 15.46* 17.31* 15.55*   HGB 14.8 14.8 15.2   HCT 44.2 45.0 45.5   * 706* 726*     Estimated Creatinine Clearance: 110 mL/min (A) (based on SCr of 0.7 mg/dL (L)).    Lab Results   Component Value Date    CREATININE 0.70 (L) 03/21/2025    CREATININE 0.67 (L) 03/20/2025    CREATININE 0.67 (L) 03/19/2025    ALKPHOS 121 03/21/2025    ALKPHOS 123 03/15/2025    ALKPHOS 123 03/14/2025        Microbiology and ID tests:     Microbiology Results (last 7 days)       Procedure Component Value Units Date/Time    Blood Culture [[id number]]  (Normal) Collected: 03/17/25 1602    Order Status: Completed Specimen: Blood Updated: 03/21/25 2001     Blood Culture No Growth At 96 Hours    Blood Culture [[id number]]  (Normal) Collected: 03/17/25 1602    Order Status: Completed Specimen: Blood Updated: 03/21/25 2001     Blood Culture No Growth At 96 Hours    Wound Culture [[id number]]  (Abnormal)  (Susceptibility) Collected: 03/15/25 0144    Order Status: Completed Specimen: Abscess from Shoulder, Left Updated: 03/17/25 0655     Wound Culture Many Methicillin Sensitive Staphylococcus aureus              Medications   Inpatient  Scheduled Meds:   aspirin  81 mg Oral Daily    ceFAZolin (Ancef) IV (PEDS and ADULTS)  2 g Intravenous Q8H     enoxparin  40 mg Subcutaneous Q24H (prophylaxis, 1700)    fenofibrate  145 mg Oral Daily    metoprolol succinate  25 mg Oral Daily    naloxegoL  25 mg Oral Daily    nicotine  1 patch Transdermal Daily     Continuous Infusions:  PRN Meds:.  Current Facility-Administered Medications:     acetaminophen, 1,000 mg, Oral, Q6H PRN    aluminum-magnesium hydroxide-simethicone, 30 mL, Oral, QID PRN    bisacodyL, 10 mg, Rectal, Daily PRN    dextrose 50%, 12.5 g, Intravenous, PRN    dextrose 50%, 25 g, Intravenous, PRN    glucagon (human recombinant), 1 mg, Intramuscular, PRN    glucose, 16 g, Oral, PRN    glucose, 24 g, Oral, PRN    hydrOXYzine pamoate, 50 mg, Oral, Q6H PRN    melatonin, 6 mg, Oral, Nightly PRN    naloxone, 0.02 mg, Intravenous, PRN    ondansetron, 4 mg, Intravenous, Q4H PRN    oxyCODONE, 5 mg, Oral, Q4H PRN    prochlorperazine, 5 mg, Intravenous, Q6H PRN    senna-docusate 8.6-50 mg, 1 tablet, Oral, BID PRN    sodium chloride 0.9%, 10 mL, Intravenous, PRN    Flushing PICC/Midline Protocol, , , Until Discontinued **AND** sodium chloride 0.9%, 10 mL, Intravenous, Q12H PRN    Home Meds  Current Outpatient Medications   Medication Instructions    aspirin (ECOTRIN) 81 mg, Daily    diclofenac (VOLTAREN) 50 mg, Oral, 3 times daily PRN    fenofibrate 160 mg, Oral, Daily    gabapentin (NEURONTIN) 300 mg, Oral, 3 times daily    LIDOcaine (LIDODERM) 5 % 1 patch, Transdermal, Daily, Remove & Discard patch within 12 hours or as directed by MD    metoprolol tartrate (LOPRESSOR) 12.5 mg, Oral, Daily    predniSONE (DELTASONE) 20 mg, Oral, Daily    simvastatin (ZOCOR) 40 mg, Oral, Nightly       Imaging (personally reviewed):     MRI Shoulder W WO Contrast Left   Final Result      AC joint septic arthritis with osteomyelitis of the distal clavicle and acromion.         Electronically signed by: Andrea Orellana   Date:    03/21/2025   Time:    16:44      X-Ray Chest 1 View for Line/Tube Placement   Final Result      Right PICC  tip overlies the distal SVC.         Electronically signed by: Pola Kerr   Date:    03/20/2025   Time:    13:17            3/22/2025 8:30 AM    The attending portion of this evaluation, treatment, and documentation was performed per Diana Fraire MD via Telemedicine AudioVisual using the secure [employer] software platform with 2 way audio/video. The provider was located off-site and the patient is located in the hospital. The aforementioned video software was utilized to document the relevant history and physical exam.     Critical care time spent: >35 minutes

## 2025-03-22 NOTE — PROGRESS NOTES
[facility] Medicine Progress Note        Chief Complaint: Inpatient Follow-up for left shoulder septic joint    HPI per admitting team: Kimani Redd is a 59 y.o. male who  has a past medical history of Kidney stone.. The patient presented to [facility] on 3/15/2025 with a primary complaint of left shoulder pain.  Patient arrived from an outlying facility for concern of septic arthritis and abscess over the left shoulder.  Patient has not had no recent injury to his left shoulder or prior surgery.  Patient noted a bump that got worsened along with redness and warmth.  Status post I&D done in the ER.  Patient notes improvement in his shoulder pain and symptoms after I&D.  Labs were significant for leukocytosis along with the elevated inflammatory markers.  Patient was admitted to us for further workup   Left shoulder abscess was I and D on March 15th and that showed MSSA, patient had repeat bedside I and D with Orthopedics blood culture also grew MSSA Infectious diseases following and we are continuing with cefazolin SHRUTHI done on March 20th was negative for any kind of vegetation patient is still having a lot of purulent drainage  MRI showed AC joint septic arthritis with osteomyelitis of the distal clavicle and acromion  Interval Hx:   Patient seen and examined this morning afebrile discussed MRI results with the patient patient verbalized understanding    Case was discussed with patient's nurse and  on the floor.      Objective/physical exam:  General: In no acute distress, afebrile, uncomfortable with left shoulder pain  Chest: Clear to auscultation bilaterally anteriorly  Heart:  Regular rate and rhythm, +S1, S2, no appreciable murmur  Abdomen: Soft, nontender, BS +  MSK:  Dressing left shoulder, limited range of motion  Neurologic: Alert and oriented x4,  VITAL SIGNS: 24 HRS MIN & MAX LAST   Temp  Min: 97.5 °F (36.4 °C)  Max: 98.9 °F (37.2 °C) 98.9 °F (37.2 °C)   BP   Min: 113/70  Max: 131/85 124/80   Pulse  Min: 76  Max: 85  85   Resp  Min: 17  Max: 20 18   SpO2  Min: 95 %  Max: 98 % 98 %     I have reviewed the following labs:  Recent Labs   Lab 03/19/25  0340 03/20/25  0520 03/21/25  0345   WBC 15.46* 17.31* 15.55*   RBC 5.04 5.03 5.11   HGB 14.8 14.8 15.2   HCT 44.2 45.0 45.5   MCV 87.7 89.5 89.0   MCH 29.4 29.4 29.7   MCHC 33.5 32.9* 33.4   RDW 12.3 12.3 12.1   * 706* 726*   MPV 10.3 10.4 10.0     Recent Labs   Lab 03/19/25  0340 03/20/25  0520 03/21/25  0744    135* 137   K 5.4* 5.2* 4.1    102 102   CO2 23 23 27   BUN 19.4 17.9 17.7   CREATININE 0.67* 0.67* 0.70*   CALCIUM 8.9 9.1 9.1   MG 2.10 2.00 2.00   ALBUMIN  --   --  2.7*   ALKPHOS  --   --  121   ALT  --   --  16   AST  --   --  14   BILITOT  --   --  0.3     Microbiology Results (last 7 days)       Procedure Component Value Units Date/Time    Blood Culture [[id number]]  (Normal) Collected: 03/17/25 1602    Order Status: Completed Specimen: Blood Updated: 03/21/25 2001     Blood Culture No Growth At 96 Hours    Blood Culture [[id number]]  (Normal) Collected: 03/17/25 1602    Order Status: Completed Specimen: Blood Updated: 03/21/25 2001     Blood Culture No Growth At 96 Hours    Wound Culture [[id number]]  (Abnormal)  (Susceptibility) Collected: 03/15/25 0144    Order Status: Completed Specimen: Abscess from Shoulder, Left Updated: 03/17/25 0655     Wound Culture Many Methicillin Sensitive Staphylococcus aureus             See below for Radiology    Intake/Output:No intake or output data in the 24 hours ending 03/22/25 1027           Assessment/Plan:  Left AC joint septic arthritis with osteomyelitis of the distal clavicle and acromion  MSSA bacteremia likely secondary to left shoulder abscess  Left shoulder abscess status post I and D - 03/15--> MSSA  Essential hypertension  History of MI/CAD, CMO 45% with RWMA, DD  Tobacco use  Mild hyperkalemia       Continue with IV cefazolin 2 g IV Q 8  considering osteomyelitis maybe patient will need 4-6 week of IV antibiotics but we will wait for Infectious Diseases  Will update Orthopedics about the MRI results  Emigdio as such was negative for any vegetation mildly reduced EF of 45%.  There is diastolic dysfunction.  Right ventricular systolic function Ortho on board, appreciate recs, continue IV antibiotics, No indication for operative intervention for septic arthritis of the left shoulder, patient is still having a lot of purulent drainage  3/15- left shoulder abscess culture -MSSA  3/17- repeat bedside I and D per orthopedic  2/2 blood cultures - MSSA  3/17- Repeat Blood cx x 2- negative at 24 hours  Appropriate home medication for chronic medical condition has been resumed  Repeat blood work in a.m.    CM on board, , awaiting financial counselor to meet with him to see if he will be eligible for Medicaid.  If he is approved, he can go home with IV antibiotic otherwise plan to transfer to [facility] given patient's sister is an RN working with Dr. Rain- ID    VTE prophylaxis:  Lovenox    Patient condition:  Fair    Anticipated discharge and Disposition:  TBD, home with IV antibiotics if Medicaid approved versus transferred to [facility] where his sister works with ID department      All diagnosis and differential diagnosis have been reviewed; assessment and plan has been documented; I have personally reviewed the labs and test results that are presently available; I have reviewed the patients medication list; I have reviewed the consulting providers response and recommendations. I have reviewed or attempted to review medical records based upon their availability    All of the patient's questions have been  addressed and answered. Patient's is agreeable to the above stated plan. I will continue to monitor closely and make adjustments to medical management as needed.    Portions of this note dictated using EMR integrated voice recognition software, and may be subject to  voice recognition errors not corrected at proofreading. Please contact writer for clarification if needed.   _____________________________________________________________________    Malnutrition Status:  Nutrition consulted. Most recent weight and BMI monitored-     Measurements:  Wt Readings from Last 1 Encounters:   03/15/25 74.8 kg (165 lb)   Body mass index is 27.46 kg/m².    Patient has been screened and assessed by RD.    Malnutrition Type:  Context:    Level:      Malnutrition Characteristic Summary:       Interventions/Recommendations (treatment strategy):        Scheduled Med:   aspirin  81 mg Oral Daily    ceFAZolin (Ancef) IV (PEDS and ADULTS)  2 g Intravenous Q8H    enoxparin  40 mg Subcutaneous Q24H (prophylaxis, 1700)    fenofibrate  145 mg Oral Daily    metoprolol succinate  25 mg Oral Daily    naloxegoL  25 mg Oral Daily    nicotine  1 patch Transdermal Daily      Continuous Infusions:     PRN Meds:  Current Facility-Administered Medications:     acetaminophen, 1,000 mg, Oral, Q6H PRN    aluminum-magnesium hydroxide-simethicone, 30 mL, Oral, QID PRN    bisacodyL, 10 mg, Rectal, Daily PRN    dextrose 50%, 12.5 g, Intravenous, PRN    dextrose 50%, 25 g, Intravenous, PRN    glucagon (human recombinant), 1 mg, Intramuscular, PRN    glucose, 16 g, Oral, PRN    glucose, 24 g, Oral, PRN    hydrOXYzine pamoate, 50 mg, Oral, Q6H PRN    melatonin, 6 mg, Oral, Nightly PRN    naloxone, 0.02 mg, Intravenous, PRN    ondansetron, 4 mg, Intravenous, Q4H PRN    oxyCODONE, 5 mg, Oral, Q4H PRN    prochlorperazine, 5 mg, Intravenous, Q6H PRN    senna-docusate 8.6-50 mg, 1 tablet, Oral, BID PRN    sodium chloride 0.9%, 10 mL, Intravenous, PRN    Flushing PICC/Midline Protocol, , , Until Discontinued **AND** sodium chloride 0.9%, 10 mL, Intravenous, Q12H PRN     Radiology:  I have personally reviewed the following imaging and agree with the radiologist.     MRI Shoulder W WO Contrast Left  Narrative: EXAMINATION:  MRI  SHOULDER W WO CONTRAST LEFT    CLINICAL HISTORY:  Staph Aureus bacteremia, on going left shoulder pain with purulent drainage coming out, rule out septic arthritis/osteomyelitis;    TECHNIQUE:  Multisequence multiplanar MR imaging of the left shoulder before and after IV contrast.    COMPARISON:  CT 03/14/2025    FINDINGS:  Abnormal marrow signal involving the distal clavicle and acromion.  Small volume fluid in the AC joint with synovial enhancement.  Fluid extends into the subcutaneous tissues with adjacent inflammation.  Fluid likely extends to the skin surface.  No significant glenohumeral joint fluid or suspicious signal changes of the humerus.  Possible small posterior labral tear versus motion artifact.  Impression: AC joint septic arthritis with osteomyelitis of the distal clavicle and acromion.    Electronically signed by: Andrea Orellana  Date:    03/21/2025  Time:    16:44      Mirian Recio MD  Department of Hospital Medicine   [facility]   03/22/2025

## 2025-03-23 LAB
ANION GAP SERPL CALC-SCNC: 9 MEQ/L
BASOPHILS # BLD AUTO: 0.07 X10(3)/MCL
BASOPHILS NFR BLD AUTO: 0.5 %
BUN SERPL-MCNC: 20.1 MG/DL (ref 8.4–25.7)
CALCIUM SERPL-MCNC: 9.4 MG/DL (ref 8.4–10.2)
CHLORIDE SERPL-SCNC: 104 MMOL/L (ref 98–107)
CO2 SERPL-SCNC: 26 MMOL/L (ref 22–29)
CREAT SERPL-MCNC: 0.71 MG/DL (ref 0.72–1.25)
CREAT/UREA NIT SERPL: 28
EOSINOPHIL # BLD AUTO: 0.27 X10(3)/MCL (ref 0–0.9)
EOSINOPHIL NFR BLD AUTO: 2 %
ERYTHROCYTE [DISTWIDTH] IN BLOOD BY AUTOMATED COUNT: 12.1 % (ref 11.5–17)
GFR SERPLBLD CREATININE-BSD FMLA CKD-EPI: >60 ML/MIN/1.73/M2
GLUCOSE SERPL-MCNC: 89 MG/DL (ref 74–100)
HCT VFR BLD AUTO: 45.1 % (ref 42–52)
HGB BLD-MCNC: 14.9 G/DL (ref 14–18)
IMM GRANULOCYTES # BLD AUTO: 0.08 X10(3)/MCL (ref 0–0.04)
IMM GRANULOCYTES NFR BLD AUTO: 0.6 %
LYMPHOCYTES # BLD AUTO: 3.27 X10(3)/MCL (ref 0.6–4.6)
LYMPHOCYTES NFR BLD AUTO: 24.3 %
MCH RBC QN AUTO: 29.4 PG (ref 27–31)
MCHC RBC AUTO-ENTMCNC: 33 G/DL (ref 33–36)
MCV RBC AUTO: 89.1 FL (ref 80–94)
MONOCYTES # BLD AUTO: 1.53 X10(3)/MCL (ref 0.1–1.3)
MONOCYTES NFR BLD AUTO: 11.4 %
NEUTROPHILS # BLD AUTO: 8.24 X10(3)/MCL (ref 2.1–9.2)
NEUTROPHILS NFR BLD AUTO: 61.2 %
NRBC BLD AUTO-RTO: 0 %
PLATELET # BLD AUTO: 847 X10(3)/MCL (ref 130–400)
PMV BLD AUTO: 9.9 FL (ref 7.4–10.4)
POTASSIUM SERPL-SCNC: 5.3 MMOL/L (ref 3.5–5.1)
RBC # BLD AUTO: 5.06 X10(6)/MCL (ref 4.7–6.1)
SODIUM SERPL-SCNC: 139 MMOL/L (ref 136–145)
WBC # BLD AUTO: 13.46 X10(3)/MCL (ref 4.5–11.5)

## 2025-03-23 PROCEDURE — 85025 COMPLETE CBC W/AUTO DIFF WBC: CPT | Performed by: INTERNAL MEDICINE

## 2025-03-23 PROCEDURE — 80048 BASIC METABOLIC PNL TOTAL CA: CPT | Performed by: INTERNAL MEDICINE

## 2025-03-23 PROCEDURE — 21400001 HC TELEMETRY ROOM

## 2025-03-23 PROCEDURE — S4991 NICOTINE PATCH NONLEGEND: HCPCS | Performed by: STUDENT IN AN ORGANIZED HEALTH CARE EDUCATION/TRAINING PROGRAM

## 2025-03-23 PROCEDURE — 25000003 PHARM REV CODE 250: Performed by: INTERNAL MEDICINE

## 2025-03-23 PROCEDURE — 36415 COLL VENOUS BLD VENIPUNCTURE: CPT | Performed by: INTERNAL MEDICINE

## 2025-03-23 PROCEDURE — 25000003 PHARM REV CODE 250: Performed by: NURSE PRACTITIONER

## 2025-03-23 PROCEDURE — 25000003 PHARM REV CODE 250: Performed by: STUDENT IN AN ORGANIZED HEALTH CARE EDUCATION/TRAINING PROGRAM

## 2025-03-23 PROCEDURE — 63600175 PHARM REV CODE 636 W HCPCS: Performed by: INTERNAL MEDICINE

## 2025-03-23 RX ADMIN — SODIUM ZIRCONIUM CYCLOSILICATE 10 G: 10 POWDER, FOR SUSPENSION ORAL at 01:03

## 2025-03-23 RX ADMIN — CEFAZOLIN SODIUM 2 G: 2 SOLUTION INTRAVENOUS at 07:03

## 2025-03-23 RX ADMIN — METOPROLOL SUCCINATE 25 MG: 25 TABLET, EXTENDED RELEASE ORAL at 09:03

## 2025-03-23 RX ADMIN — HYDROXYZINE PAMOATE 50 MG: 50 CAPSULE ORAL at 09:03

## 2025-03-23 RX ADMIN — CEFAZOLIN SODIUM 2 G: 2 SOLUTION INTRAVENOUS at 04:03

## 2025-03-23 RX ADMIN — ASPIRIN 81 MG: 81 TABLET, COATED ORAL at 09:03

## 2025-03-23 RX ADMIN — FENOFIBRATE 145 MG: 145 TABLET, FILM COATED ORAL at 09:03

## 2025-03-23 RX ADMIN — Medication 6 MG: at 08:03

## 2025-03-23 RX ADMIN — OXYCODONE HYDROCHLORIDE 5 MG: 5 TABLET ORAL at 03:03

## 2025-03-23 RX ADMIN — CEFAZOLIN SODIUM 2 G: 2 SOLUTION INTRAVENOUS at 12:03

## 2025-03-23 RX ADMIN — HYDROXYZINE PAMOATE 50 MG: 50 CAPSULE ORAL at 01:03

## 2025-03-23 RX ADMIN — OXYCODONE HYDROCHLORIDE 5 MG: 5 TABLET ORAL at 07:03

## 2025-03-23 RX ADMIN — OXYCODONE HYDROCHLORIDE 5 MG: 5 TABLET ORAL at 10:03

## 2025-03-23 RX ADMIN — NICOTINE 1 PATCH: 14 PATCH TRANSDERMAL at 07:03

## 2025-03-23 RX ADMIN — OXYCODONE HYDROCHLORIDE 5 MG: 5 TABLET ORAL at 01:03

## 2025-03-23 RX ADMIN — HYDROXYZINE PAMOATE 50 MG: 50 CAPSULE ORAL at 03:03

## 2025-03-23 RX ADMIN — NALOXEGOL OXALATE 25 MG: 25 TABLET, FILM COATED ORAL at 09:03

## 2025-03-23 RX ADMIN — OXYCODONE HYDROCHLORIDE 5 MG: 5 TABLET ORAL at 06:03

## 2025-03-23 NOTE — PROGRESS NOTES
[facility] Medicine Progress Note        Chief Complaint: Inpatient Follow-up for left shoulder septic joint    HPI per admitting team: Kimani Redd is a 59 y.o. male who  has a past medical history of Kidney stone.. The patient presented to [facility] on 3/15/2025 with a primary complaint of left shoulder pain.  Patient arrived from an outlying facility for concern of septic arthritis and abscess over the left shoulder.  Patient has not had no recent injury to his left shoulder or prior surgery.  Patient noted a bump that got worsened along with redness and warmth.  Status post I&D done in the ER.  Patient notes improvement in his shoulder pain and symptoms after I&D.  Labs were significant for leukocytosis along with the elevated inflammatory markers.  Patient was admitted to us for further workup   Left shoulder abscess was I and D on March 15th and that showed MSSA, patient had repeat bedside I and D with Orthopedics blood culture also grew MSSA Infectious diseases following and we are continuing with cefazolin SHRUTHI done on March 20th was negative for any kind of vegetation patient is still having a lot of purulent drainage  MRI showed AC joint septic arthritis with osteomyelitis of the distal clavicle and acromion  Orthopedics is planning to take the patient for washout tomorrow.  White cell count is trending down  Interval Hx:     Patient seen and examined this morning with family and nursing staff bedside was shoulder pain is slightly better plan for washout white cell count     Objective/physical exam:  General: In no acute distress, afebrile, uncomfortable with left shoulder pain  Chest: Clear to auscultation bilaterally anteriorly  Heart:  Regular rate and rhythm, +S1, S2, no appreciable murmur  Abdomen: Soft, nontender, BS +  MSK:  Dressing left shoulder, limited range of motion  Neurologic: Alert and oriented x4,  VITAL SIGNS: 24 HRS MIN & MAX LAST   Temp  Min: 97.4 °F  (36.3 °C)  Max: 98.5 °F (36.9 °C) 98.2 °F (36.8 °C)   BP  Min: 107/63  Max: 137/83 125/72   Pulse  Min: 77  Max: 87  77   Resp  Min: 17  Max: 18 18   SpO2  Min: 95 %  Max: 98 % 98 %     I have reviewed the following labs:  Recent Labs   Lab 03/20/25  0520 03/21/25  0345 03/23/25  0901   WBC 17.31* 15.55* 13.46*   RBC 5.03 5.11 5.06   HGB 14.8 15.2 14.9   HCT 45.0 45.5 45.1   MCV 89.5 89.0 89.1   MCH 29.4 29.7 29.4   MCHC 32.9* 33.4 33.0   RDW 12.3 12.1 12.1   * 726* 847*   MPV 10.4 10.0 9.9     Recent Labs   Lab 03/19/25  0340 03/20/25  0520 03/21/25  0744 03/23/25  0900    135* 137 139   K 5.4* 5.2* 4.1 5.3*    102 102 104   CO2 23 23 27 26   BUN 19.4 17.9 17.7 20.1   CREATININE 0.67* 0.67* 0.70* 0.71*   CALCIUM 8.9 9.1 9.1 9.4   MG 2.10 2.00 2.00  --    ALBUMIN  --   --  2.7*  --    ALKPHOS  --   --  121  --    ALT  --   --  16  --    AST  --   --  14  --    BILITOT  --   --  0.3  --      Microbiology Results (last 7 days)       Procedure Component Value Units Date/Time    Blood Culture [[id number]]  (Normal) Collected: 03/17/25 1602    Order Status: Completed Specimen: Blood Updated: 03/22/25 2001     Blood Culture No Growth at 5 days    Blood Culture [[id number]]  (Normal) Collected: 03/17/25 1602    Order Status: Completed Specimen: Blood Updated: 03/22/25 2001     Blood Culture No Growth at 5 days    Wound Culture [[id number]]  (Abnormal)  (Susceptibility) Collected: 03/15/25 0144    Order Status: Completed Specimen: Abscess from Shoulder, Left Updated: 03/17/25 0655     Wound Culture Many Methicillin Sensitive Staphylococcus aureus             See below for Radiology    Intake/Output:  Intake/Output Summary (Last 24 hours) at 3/23/2025 1212  Last data filed at 3/22/2025 1814  Gross per 24 hour   Intake 480 ml   Output 9 ml   Net 471 ml              Assessment/Plan:  Left AC joint septic arthritis with osteomyelitis of the distal clavicle and acromion  MSSA bacteremia likely secondary to  left shoulder abscess  Left shoulder abscess status post I and D - 03/15--> MSSA  Essential hypertension  History of MI/CAD, CMO 45% with RWMA, DD  Tobacco use  Mild hyperkalemia     Orthopedics is planning to take the patient for washout tomorrow.  White cell count is trending down  Continue with IV cefazolin 2 g IV Q 8   Emigdio as such was negative for any vegetation mildly reduced EF of 45%.  There is diastolic dysfunction.  Right ventricular systolic function Ortho on board, appreciate recs, continue IV antibiotics, No indication for operative intervention for septic arthritis of the left shoulder, patient is still having a lot of purulent drainage  3/15- left shoulder abscess culture -MSSA  3/17- repeat bedside I and D per orthopedic  2/2 blood cultures - MSSA  3/17- Repeat Blood cx x 2- negative at 24 hours  Appropriate home medication for chronic medical condition has been resumed  Repeat blood work in a.m.    CM on board, , awaiting financial counselor to meet with him to see if he will be eligible for Medicaid.  If he is approved, he can go home with IV antibiotic otherwise plan to transfer to [facility] given patient's sister is an RN working with Dr. Rain- ID    VTE prophylaxis:  Lovenox    Patient condition:  Fair    Anticipated discharge and Disposition:  TBD, home with IV antibiotics if Medicaid approved versus transferred to [facility] where his sister works with ID department      All diagnosis and differential diagnosis have been reviewed; assessment and plan has been documented; I have personally reviewed the labs and test results that are presently available; I have reviewed the patients medication list; I have reviewed the consulting providers response and recommendations. I have reviewed or attempted to review medical records based upon their availability    All of the patient's questions have been  addressed and answered. Patient's is agreeable to the above stated plan. I will continue to monitor closely and  make adjustments to medical management as needed.    Portions of this note dictated using EMR integrated voice recognition software, and may be subject to voice recognition errors not corrected at proofreading. Please contact writer for clarification if needed.   _____________________________________________________________________    Malnutrition Status:  Nutrition consulted. Most recent weight and BMI monitored-     Measurements:  Wt Readings from Last 1 Encounters:   03/15/25 74.8 kg (165 lb)   Body mass index is 27.46 kg/m².    Patient has been screened and assessed by RD.    Malnutrition Type:  Context:    Level:      Malnutrition Characteristic Summary:       Interventions/Recommendations (treatment strategy):        Scheduled Med:   aspirin  81 mg Oral Daily    ceFAZolin (Ancef) IV (PEDS and ADULTS)  2 g Intravenous Q8H    enoxparin  40 mg Subcutaneous Q24H (prophylaxis, 1700)    fenofibrate  145 mg Oral Daily    metoprolol succinate  25 mg Oral Daily    naloxegoL  25 mg Oral Daily    nicotine  1 patch Transdermal Daily      Continuous Infusions:     PRN Meds:  Current Facility-Administered Medications:     acetaminophen, 1,000 mg, Oral, Q6H PRN    aluminum-magnesium hydroxide-simethicone, 30 mL, Oral, QID PRN    bisacodyL, 10 mg, Rectal, Daily PRN    dextrose 50%, 12.5 g, Intravenous, PRN    dextrose 50%, 25 g, Intravenous, PRN    glucagon (human recombinant), 1 mg, Intramuscular, PRN    glucose, 16 g, Oral, PRN    glucose, 24 g, Oral, PRN    hydrOXYzine pamoate, 50 mg, Oral, Q6H PRN    melatonin, 6 mg, Oral, Nightly PRN    naloxone, 0.02 mg, Intravenous, PRN    ondansetron, 4 mg, Intravenous, Q4H PRN    oxyCODONE, 5 mg, Oral, Q4H PRN    prochlorperazine, 5 mg, Intravenous, Q6H PRN    senna-docusate 8.6-50 mg, 1 tablet, Oral, BID PRN    sodium chloride 0.9%, 10 mL, Intravenous, PRN    Flushing PICC/Midline Protocol, , , Until Discontinued **AND** sodium chloride 0.9%, 10 mL, Intravenous, Q12H PRN      Radiology:  I have personally reviewed the following imaging and agree with the radiologist.     MRI Shoulder W WO Contrast Left  Narrative: EXAMINATION:  MRI SHOULDER W WO CONTRAST LEFT    CLINICAL HISTORY:  Staph Aureus bacteremia, on going left shoulder pain with purulent drainage coming out, rule out septic arthritis/osteomyelitis;    TECHNIQUE:  Multisequence multiplanar MR imaging of the left shoulder before and after IV contrast.    COMPARISON:  CT 03/14/2025    FINDINGS:  Abnormal marrow signal involving the distal clavicle and acromion.  Small volume fluid in the AC joint with synovial enhancement.  Fluid extends into the subcutaneous tissues with adjacent inflammation.  Fluid likely extends to the skin surface.  No significant glenohumeral joint fluid or suspicious signal changes of the humerus.  Possible small posterior labral tear versus motion artifact.  Impression: AC joint septic arthritis with osteomyelitis of the distal clavicle and acromion.    Electronically signed by: Andrea Orellana  Date:    03/21/2025  Time:    16:44      Mirian Recio MD  Department of Hospital Medicine   [facility]   03/23/2025

## 2025-03-24 LAB — POTASSIUM SERPL-SCNC: 4.3 MMOL/L (ref 3.5–5.1)

## 2025-03-24 PROCEDURE — 25000003 PHARM REV CODE 250: Performed by: STUDENT IN AN ORGANIZED HEALTH CARE EDUCATION/TRAINING PROGRAM

## 2025-03-24 PROCEDURE — 63600175 PHARM REV CODE 636 W HCPCS: Performed by: INTERNAL MEDICINE

## 2025-03-24 PROCEDURE — 84132 ASSAY OF SERUM POTASSIUM: CPT | Performed by: INTERNAL MEDICINE

## 2025-03-24 PROCEDURE — 63600175 PHARM REV CODE 636 W HCPCS: Performed by: STUDENT IN AN ORGANIZED HEALTH CARE EDUCATION/TRAINING PROGRAM

## 2025-03-24 PROCEDURE — 25000003 PHARM REV CODE 250: Performed by: INTERNAL MEDICINE

## 2025-03-24 PROCEDURE — 25000003 PHARM REV CODE 250: Performed by: NURSE PRACTITIONER

## 2025-03-24 PROCEDURE — 21400001 HC TELEMETRY ROOM

## 2025-03-24 PROCEDURE — 36415 COLL VENOUS BLD VENIPUNCTURE: CPT | Performed by: INTERNAL MEDICINE

## 2025-03-24 PROCEDURE — S4991 NICOTINE PATCH NONLEGEND: HCPCS | Performed by: STUDENT IN AN ORGANIZED HEALTH CARE EDUCATION/TRAINING PROGRAM

## 2025-03-24 RX ADMIN — OXYCODONE HYDROCHLORIDE 5 MG: 5 TABLET ORAL at 04:03

## 2025-03-24 RX ADMIN — CEFAZOLIN SODIUM 2 G: 2 SOLUTION INTRAVENOUS at 08:03

## 2025-03-24 RX ADMIN — NALOXEGOL OXALATE 25 MG: 25 TABLET, FILM COATED ORAL at 08:03

## 2025-03-24 RX ADMIN — HYDROXYZINE PAMOATE 50 MG: 50 CAPSULE ORAL at 08:03

## 2025-03-24 RX ADMIN — HYDROXYZINE PAMOATE 50 MG: 50 CAPSULE ORAL at 06:03

## 2025-03-24 RX ADMIN — OXYCODONE HYDROCHLORIDE 5 MG: 5 TABLET ORAL at 12:03

## 2025-03-24 RX ADMIN — OXYCODONE HYDROCHLORIDE 5 MG: 5 TABLET ORAL at 08:03

## 2025-03-24 RX ADMIN — ENOXAPARIN SODIUM 40 MG: 40 INJECTION SUBCUTANEOUS at 04:03

## 2025-03-24 RX ADMIN — OXYCODONE HYDROCHLORIDE 5 MG: 5 TABLET ORAL at 09:03

## 2025-03-24 RX ADMIN — FENOFIBRATE 145 MG: 145 TABLET, FILM COATED ORAL at 08:03

## 2025-03-24 RX ADMIN — HYDROXYZINE PAMOATE 50 MG: 50 CAPSULE ORAL at 12:03

## 2025-03-24 RX ADMIN — Medication 6 MG: at 09:03

## 2025-03-24 RX ADMIN — CEFAZOLIN SODIUM 2 G: 2 SOLUTION INTRAVENOUS at 01:03

## 2025-03-24 RX ADMIN — CEFAZOLIN SODIUM 2 G: 2 SOLUTION INTRAVENOUS at 04:03

## 2025-03-24 RX ADMIN — METOPROLOL SUCCINATE 25 MG: 25 TABLET, EXTENDED RELEASE ORAL at 08:03

## 2025-03-24 RX ADMIN — NICOTINE 1 PATCH: 14 PATCH TRANSDERMAL at 08:03

## 2025-03-24 RX ADMIN — ASPIRIN 81 MG: 81 TABLET, COATED ORAL at 08:03

## 2025-03-24 NOTE — PROGRESS NOTES
[facility] - 5th Floor Med Surg  Wound Care    Patient Name:  Kimani Redd   MRN:  [id number]  Date: 3/24/2025  Diagnosis: <principal problem not specified>    History:     Past Medical History:   Diagnosis Date    Kidney stone     passed 2 weeks ago       Social History[1]    Precautions:     Allergies as of 03/14/2025    (No Known Allergies)       WOC Assessment Details/Treatment     WOCN follow up for left shoulder wound. Discussed POC with nurse. Family at bedside.  Informed pt step by step on the care that was being done. Continue Current treatment recommendations Left Shoulder: removed dressing. Removed packing. Cleansed well with Vashe. Measurements obtained along with photos. Treatment performed: Adaptic, wet to dry gauze, covered with ABD, covered with tape.  Pt had no further questions or concerns. Wound care to be performed daily. Wound care will follow up.     03/24/2025         [1]   Social History  Socioeconomic History    Marital status:    Occupational History     Comment: employed   Tobacco Use    Smoking status: Every Day     Current packs/day: 0.50     Types: Cigarettes    Smokeless tobacco: Never   Substance and Sexual Activity    Alcohol use: Never    Drug use: Never    Sexual activity: Not Currently   Social History Narrative    ** Merged History Encounter **

## 2025-03-24 NOTE — PROGRESS NOTES
[facility]        Chief Complaint: Inpatient Follow-up for left shoulder septic joint    HPI per admitting team: Kimani Redd is a 59 y.o. male who  has a past medical history of Kidney stone.. The patient presented to [facility] on 3/15/2025 with a primary complaint of left shoulder pain.  Patient arrived from an outlying facility for concern of septic arthritis and abscess over the left shoulder.  Patient has not had no recent injury to his left shoulder or prior surgery.  Patient noted a bump that got worsened along with redness and warmth.  Status post I&D done in the ER.  Patient notes improvement in his shoulder pain and symptoms after I&D.  Labs were significant for leukocytosis along with the elevated inflammatory markers.  Patient was admitted to us for further workup   Left shoulder abscess was I and D on March 15th and that showed MSSA, patient had repeat bedside I and D with Orthopedics blood culture also grew MSSA Infectious diseases following and we are continuing with cefazolin SHRUTHI done on March 20th was negative for any kind of vegetation patient is still having a lot of purulent drainage  MRI showed AC joint septic arthritis with osteomyelitis of the distal clavicle and acromion  Orthopedics is planning to take the patient for washout tomorrow.  White cell count is trending down  Interval Hx:     Patient seen and examined this morning plan for washout in a.m.    Objective/physical exam:  General: In no acute distress, afebrile, uncomfortable with left shoulder pain  Chest: Clear to auscultation bilaterally anteriorly  Heart:  Regular rate and rhythm, +S1, S2, no appreciable murmur  Abdomen: Soft, nontender, BS +  MSK:  Dressing left shoulder, limited range of motion  Neurologic: Alert and oriented x4,  VITAL SIGNS: 24 HRS MIN & MAX LAST   Temp  Min: 97.9 °F (36.6 °C)  Max: 98.3 °F (36.8 °C) 98.2 °F (36.8 °C)   BP  Min: 112/75  Max: 136/77  125/76   Pulse  Min: 76  Max: 83  80   Resp  Min: 17  Max: 18 18   SpO2  Min: 95 %  Max: 100 % 97 %     I have reviewed the following labs:  Recent Labs   Lab 03/20/25  0520 03/21/25  0345 03/23/25  0901   WBC 17.31* 15.55* 13.46*   RBC 5.03 5.11 5.06   HGB 14.8 15.2 14.9   HCT 45.0 45.5 45.1   MCV 89.5 89.0 89.1   MCH 29.4 29.7 29.4   MCHC 32.9* 33.4 33.0   RDW 12.3 12.1 12.1   * 726* 847*   MPV 10.4 10.0 9.9     Recent Labs   Lab 03/19/25  0340 03/20/25  0520 03/21/25  0744 03/23/25  0900    135* 137 139   K 5.4* 5.2* 4.1 5.3*    102 102 104   CO2 23 23 27 26   BUN 19.4 17.9 17.7 20.1   CREATININE 0.67* 0.67* 0.70* 0.71*   CALCIUM 8.9 9.1 9.1 9.4   MG 2.10 2.00 2.00  --    ALBUMIN  --   --  2.7*  --    ALKPHOS  --   --  121  --    ALT  --   --  16  --    AST  --   --  14  --    BILITOT  --   --  0.3  --      Microbiology Results (last 7 days)       Procedure Component Value Units Date/Time    Blood Culture [[id number]]  (Normal) Collected: 03/17/25 1602    Order Status: Completed Specimen: Blood Updated: 03/22/25 2001     Blood Culture No Growth at 5 days    Blood Culture [[id number]]  (Normal) Collected: 03/17/25 1602    Order Status: Completed Specimen: Blood Updated: 03/22/25 2001     Blood Culture No Growth at 5 days             See below for Radiology    Intake/Output:  Intake/Output Summary (Last 24 hours) at 3/24/2025 1030  Last data filed at 3/23/2025 1600  Gross per 24 hour   Intake 900 ml   Output --   Net 900 ml              Assessment/Plan:  Left AC joint septic arthritis with osteomyelitis of the distal clavicle and acromion  MSSA bacteremia likely secondary to left shoulder abscess  Left shoulder abscess status post I and D - 03/15--> MSSA  Essential hypertension  History of MI/CAD, CMO 45% with RWMA, DD  Tobacco use  Mild hyperkalemia     Orthopedics is planning to take the patient for washout tomorrow.  White cell count is trending down  Continue with IV cefazolin 2 g IV Q 8    Emigdio as such was negative for any vegetation mildly reduced EF of 45%.  There is diastolic dysfunction.  Right ventricular systolic function Ortho on board, appreciate recs, continue IV antibiotics, No indication for operative intervention for septic arthritis of the left shoulder, patient is still having a lot of purulent drainage  3/15- left shoulder abscess culture -MSSA  3/17- repeat bedside I and D per orthopedic  2/2 blood cultures - MSSA  3/17- Repeat Blood cx x 2- negative at 24 hours  Appropriate home medication for chronic medical condition has been resumed  Repeat blood work in a.m.    CM on board, , awaiting financial counselor to meet with him to see if he will be eligible for Medicaid.  If he is approved, he can go home with IV antibiotic otherwise plan to transfer to [facility] given patient's sister is an RN working with Dr. Rain- ID    VTE prophylaxis:  Lovenox    Patient condition:  Fair    Anticipated discharge and Disposition:  TBD, home with IV antibiotics if Medicaid approved versus transferred to [facility] where his sister works with ID department      All diagnosis and differential diagnosis have been reviewed; assessment and plan has been documented; I have personally reviewed the labs and test results that are presently available; I have reviewed the patients medication list; I have reviewed the consulting providers response and recommendations. I have reviewed or attempted to review medical records based upon their availability    All of the patient's questions have been  addressed and answered. Patient's is agreeable to the above stated plan. I will continue to monitor closely and make adjustments to medical management as needed.    Portions of this note dictated using EMR integrated voice recognition software, and may be subject to voice recognition errors not corrected at proofreading. Please contact writer for clarification if needed.    _____________________________________________________________________    Malnutrition Status:  Nutrition consulted. Most recent weight and BMI monitored-     Measurements:  Wt Readings from Last 1 Encounters:   03/15/25 74.8 kg (165 lb)   Body mass index is 27.46 kg/m².    Patient has been screened and assessed by RD.    Malnutrition Type:  Context:    Level:      Malnutrition Characteristic Summary:       Interventions/Recommendations (treatment strategy):        Scheduled Med:   aspirin  81 mg Oral Daily    ceFAZolin (Ancef) IV (PEDS and ADULTS)  2 g Intravenous Q8H    enoxparin  40 mg Subcutaneous Q24H (prophylaxis, 1700)    fenofibrate  145 mg Oral Daily    metoprolol succinate  25 mg Oral Daily    naloxegoL  25 mg Oral Daily    nicotine  1 patch Transdermal Daily      Continuous Infusions:     PRN Meds:  Current Facility-Administered Medications:     acetaminophen, 1,000 mg, Oral, Q6H PRN    aluminum-magnesium hydroxide-simethicone, 30 mL, Oral, QID PRN    bisacodyL, 10 mg, Rectal, Daily PRN    dextrose 50%, 12.5 g, Intravenous, PRN    dextrose 50%, 25 g, Intravenous, PRN    glucagon (human recombinant), 1 mg, Intramuscular, PRN    glucose, 16 g, Oral, PRN    glucose, 24 g, Oral, PRN    hydrOXYzine pamoate, 50 mg, Oral, Q6H PRN    melatonin, 6 mg, Oral, Nightly PRN    naloxone, 0.02 mg, Intravenous, PRN    ondansetron, 4 mg, Intravenous, Q4H PRN    oxyCODONE, 5 mg, Oral, Q4H PRN    prochlorperazine, 5 mg, Intravenous, Q6H PRN    senna-docusate 8.6-50 mg, 1 tablet, Oral, BID PRN    sodium chloride 0.9%, 10 mL, Intravenous, PRN    Flushing PICC/Midline Protocol, , , Until Discontinued **AND** sodium chloride 0.9%, 10 mL, Intravenous, Q12H PRN     Radiology:  I have personally reviewed the following imaging and agree with the radiologist.     MRI Shoulder W WO Contrast Left  Narrative: EXAMINATION:  MRI SHOULDER W WO CONTRAST LEFT    CLINICAL HISTORY:  Staph Aureus bacteremia, on going left shoulder pain with  purulent drainage coming out, rule out septic arthritis/osteomyelitis;    TECHNIQUE:  Multisequence multiplanar MR imaging of the left shoulder before and after IV contrast.    COMPARISON:  CT 03/14/2025    FINDINGS:  Abnormal marrow signal involving the distal clavicle and acromion.  Small volume fluid in the AC joint with synovial enhancement.  Fluid extends into the subcutaneous tissues with adjacent inflammation.  Fluid likely extends to the skin surface.  No significant glenohumeral joint fluid or suspicious signal changes of the humerus.  Possible small posterior labral tear versus motion artifact.  Impression: AC joint septic arthritis with osteomyelitis of the distal clavicle and acromion.    Electronically signed by: Andrea Orellana  Date:    03/21/2025  Time:    16:44      Mirian Recio MD  Department of Hospital Medicine   [facility]   03/24/2025

## 2025-03-24 NOTE — PROGRESS NOTES
Patient Name: Kimani Redd  MRN: [id number]  Admission Date: 3/15/2025  Hospital Length of Stay: 9 days  Attending Provider: Mirian Recio MD  Primary Care Provider: Jessi Leyva FNP    Subjective:       Principal Orthopedic Problem: * No surgery found *   Left shoulder superficial abscess status post bedside incision and drainage    Interval History:  An MRI was obtained over the weekend of the left shoulder.  Read indicates as suggestion potential for distal clavicle osteomyelitis.  No glenohumeral joint involvement.  Clinically the patient states that he feels that it is getting better, less sore and less drainage on his bandages.  He has received daily wound care packing exchange.  Continues IV antibiotics.    Review of patient's allergies indicates:  No Known Allergies    Current Facility-Administered Medications   Medication    acetaminophen tablet 1,000 mg    aluminum-magnesium hydroxide-simethicone 200-200-20 mg/5 mL suspension 30 mL    aspirin EC tablet 81 mg    bisacodyL suppository 10 mg    cefazolin (ANCEF) 2 gram in dextrose 5% 50 mL IVPB (premix)    dextrose 50% injection 12.5 g    dextrose 50% injection 25 g    enoxaparin injection 40 mg    fenofibrate tablet 145 mg    glucagon (human recombinant) injection 1 mg    glucose chewable tablet 16 g    glucose chewable tablet 24 g    hydrOXYzine pamoate capsule 50 mg    melatonin tablet 6 mg    metoprolol succinate (TOPROL-XL) 24 hr tablet 25 mg    naloxegoL (MOVANTIK) tablet 25 mg    naloxone 0.4 mg/mL injection 0.02 mg    nicotine 14 mg/24 hr 1 patch    ondansetron injection 4 mg    oxyCODONE immediate release tablet 5 mg    prochlorperazine injection Soln 5 mg    senna-docusate 8.6-50 mg per tablet 1 tablet    sodium chloride 0.9% flush 10 mL    sodium chloride 0.9% flush 10 mL     Objective:     Vital Signs (Most Recent):  Temp: 98.3 °F (36.8 °C) (03/24/25 0410)  Pulse: 76 (03/24/25 0410)  Resp: 17 (03/24/25 0404)  BP: 124/82 (03/24/25  "0410)  SpO2: 97 % (03/24/25 0410) Vital Signs (24h Range):  Temp:  [97.9 °F (36.6 °C)-98.3 °F (36.8 °C)] 98.3 °F (36.8 °C)  Pulse:  [76-81] 76  Resp:  [17-18] 17  SpO2:  [95 %-100 %] 97 %  BP: (107-136)/(63-83) 124/82     Weight: 74.8 kg (165 lb)  Height: 5' 5" (165.1 cm)  Body mass index is 27.46 kg/m².      Intake/Output Summary (Last 24 hours) at 3/24/2025 0754  Last data filed at 3/23/2025 1600  Gross per 24 hour   Intake 900 ml   Output --   Net 900 ml       Physical Exam:   Ortho/SPM Exam    General the patient is alert and oriented x3 no acute distress nontoxic-appearing appropriate affect.    Constitutional: Vital signs are examined and stable.  Resp: No signs of labored breathing    Musculoskeletal Exam:  Left upper extremity:  Dressing removed today, fibrinous exudate in the base of the wound.  Friable tissue along the edges which is bleeding when the bandage was removed however there is no purulence expressed.  Mild erythema along the skin edges but no spreading erythema noted.  Tolerates gentle passive circumduction of the shoulder without significant pain.  Remains neurovascularly intact distally.    Diagnostic Findings:   Significant Labs: BMP:   Recent Labs   Lab 03/23/25  0900      K 5.3*      CO2 26   BUN 20.1   CREATININE 0.71*   CALCIUM 9.4     CBC:   Recent Labs   Lab 03/23/25  0901   WBC 13.46*   HGB 14.9   HCT 45.1   *     CMP:   Recent Labs   Lab 03/23/25  0900      K 5.3*      CO2 26   BUN 20.1   CREATININE 0.71*   CALCIUM 9.4     All pertinent labs within the past 24 hours have been reviewed.        Significant Imaging: I have reviewed and interpreted all pertinent imaging results/findings.     Assessment/Plan:     There are no hospital problems to display for this patient.  His white count is diminishing though it remains elevated.  Difficult to determine based on examination whether this is a deep infection involving his distal clavicle.  I suspect that it is " not and then he has reactive edema due to his local soft tissue irritation however since an MRi was obtained and it does suggest the presence of osteomyelitis this would be an acute problem likely amenable to continued IV antibiotics alone but it is not unreasonable for us to perform a debridement of the distal clavicle with excision and local irrigation.  Clinically I feel that he is improving with antibiotics now however if we are able to advance his recovery with a surgical debridement I will plan to take him to the operating room tomorrow to perform this surgery.  He is in agreement with this plan of care and plan to have him NPO at midnight tonight.  Continue local wound care, continue IV antibiotics.      This note/OR report was created with the assistance of  voice recognition software or phone  dictation.  There may be transcription errors as a result of using this technology however minimal. Effort has been made to assure accuracy of transcription but any obvious errors or omissions should be clarified with the author of the document.           Ernetso Jefferson MD  Orthopedic Trauma Surgery  [facility] - 5th Floor Med Surg

## 2025-03-25 ENCOUNTER — ANESTHESIA (OUTPATIENT)
Dept: SURGERY | Facility: HOSPITAL | Age: 60
End: 2025-03-25

## 2025-03-25 ENCOUNTER — ANESTHESIA EVENT (OUTPATIENT)
Dept: SURGERY | Facility: HOSPITAL | Age: 60
End: 2025-03-25

## 2025-03-25 LAB
BASOPHILS # BLD AUTO: 0.08 X10(3)/MCL
BASOPHILS NFR BLD AUTO: 0.6 %
EOSINOPHIL # BLD AUTO: 0.4 X10(3)/MCL (ref 0–0.9)
EOSINOPHIL NFR BLD AUTO: 2.9 %
ERYTHROCYTE [DISTWIDTH] IN BLOOD BY AUTOMATED COUNT: 11.8 % (ref 11.5–17)
GRAM STN SPEC: NORMAL
GRAM STN SPEC: NORMAL
HCT VFR BLD AUTO: 46.4 % (ref 42–52)
HGB BLD-MCNC: 15.6 G/DL (ref 14–18)
IMM GRANULOCYTES # BLD AUTO: 0.09 X10(3)/MCL (ref 0–0.04)
IMM GRANULOCYTES NFR BLD AUTO: 0.6 %
LYMPHOCYTES # BLD AUTO: 3.7 X10(3)/MCL (ref 0.6–4.6)
LYMPHOCYTES NFR BLD AUTO: 26.6 %
MCH RBC QN AUTO: 29.3 PG (ref 27–31)
MCHC RBC AUTO-ENTMCNC: 33.6 G/DL (ref 33–36)
MCV RBC AUTO: 87.1 FL (ref 80–94)
MONOCYTES # BLD AUTO: 1.4 X10(3)/MCL (ref 0.1–1.3)
MONOCYTES NFR BLD AUTO: 10.1 %
NEUTROPHILS # BLD AUTO: 8.24 X10(3)/MCL (ref 2.1–9.2)
NEUTROPHILS NFR BLD AUTO: 59.2 %
NRBC BLD AUTO-RTO: 0 %
PLATELET # BLD AUTO: 740 X10(3)/MCL (ref 130–400)
PMV BLD AUTO: 9.6 FL (ref 7.4–10.4)
RBC # BLD AUTO: 5.33 X10(6)/MCL (ref 4.7–6.1)
WBC # BLD AUTO: 13.91 X10(3)/MCL (ref 4.5–11.5)

## 2025-03-25 PROCEDURE — 85025 COMPLETE CBC W/AUTO DIFF WBC: CPT | Performed by: INTERNAL MEDICINE

## 2025-03-25 PROCEDURE — 87075 CULTR BACTERIA EXCEPT BLOOD: CPT | Performed by: ORTHOPAEDIC SURGERY

## 2025-03-25 PROCEDURE — 37000008 HC ANESTHESIA 1ST 15 MINUTES: Performed by: ORTHOPAEDIC SURGERY

## 2025-03-25 PROCEDURE — 87205 SMEAR GRAM STAIN: CPT | Performed by: ORTHOPAEDIC SURGERY

## 2025-03-25 PROCEDURE — 23180 PRTL EXCISION BONE CLAVICLE: CPT | Mod: LT,,, | Performed by: ORTHOPAEDIC SURGERY

## 2025-03-25 PROCEDURE — 37000009 HC ANESTHESIA EA ADD 15 MINS: Performed by: ORTHOPAEDIC SURGERY

## 2025-03-25 PROCEDURE — 25000003 PHARM REV CODE 250: Performed by: INTERNAL MEDICINE

## 2025-03-25 PROCEDURE — 63600175 PHARM REV CODE 636 W HCPCS: Performed by: ORTHOPAEDIC SURGERY

## 2025-03-25 PROCEDURE — 63600175 PHARM REV CODE 636 W HCPCS: Performed by: STUDENT IN AN ORGANIZED HEALTH CARE EDUCATION/TRAINING PROGRAM

## 2025-03-25 PROCEDURE — S4991 NICOTINE PATCH NONLEGEND: HCPCS | Performed by: STUDENT IN AN ORGANIZED HEALTH CARE EDUCATION/TRAINING PROGRAM

## 2025-03-25 PROCEDURE — 36000704 HC OR TIME LEV I 1ST 15 MIN: Performed by: ORTHOPAEDIC SURGERY

## 2025-03-25 PROCEDURE — 63600175 PHARM REV CODE 636 W HCPCS

## 2025-03-25 PROCEDURE — 87102 FUNGUS ISOLATION CULTURE: CPT | Performed by: ORTHOPAEDIC SURGERY

## 2025-03-25 PROCEDURE — 0KBJ0ZZ EXCISION OF LEFT THORAX MUSCLE, OPEN APPROACH: ICD-10-PCS | Performed by: ORTHOPAEDIC SURGERY

## 2025-03-25 PROCEDURE — 21400001 HC TELEMETRY ROOM

## 2025-03-25 PROCEDURE — 25000003 PHARM REV CODE 250

## 2025-03-25 PROCEDURE — 63600175 PHARM REV CODE 636 W HCPCS: Performed by: ANESTHESIOLOGY

## 2025-03-25 PROCEDURE — 25000003 PHARM REV CODE 250: Performed by: STUDENT IN AN ORGANIZED HEALTH CARE EDUCATION/TRAINING PROGRAM

## 2025-03-25 PROCEDURE — 63600175 PHARM REV CODE 636 W HCPCS: Performed by: INTERNAL MEDICINE

## 2025-03-25 PROCEDURE — 11000001 HC ACUTE MED/SURG PRIVATE ROOM

## 2025-03-25 PROCEDURE — 36000705 HC OR TIME LEV I EA ADD 15 MIN: Performed by: ORTHOPAEDIC SURGERY

## 2025-03-25 PROCEDURE — 99499 UNLISTED E&M SERVICE: CPT | Mod: AS,,, | Performed by: NURSE PRACTITIONER

## 2025-03-25 PROCEDURE — 25000003 PHARM REV CODE 250: Performed by: NURSE PRACTITIONER

## 2025-03-25 PROCEDURE — 36415 COLL VENOUS BLD VENIPUNCTURE: CPT | Performed by: INTERNAL MEDICINE

## 2025-03-25 PROCEDURE — 71000033 HC RECOVERY, INTIAL HOUR: Performed by: ORTHOPAEDIC SURGERY

## 2025-03-25 PROCEDURE — 23030 I&D SHOULDER DEEP ABSC/HMTMA: CPT | Mod: 51,LT,, | Performed by: ORTHOPAEDIC SURGERY

## 2025-03-25 PROCEDURE — 87070 CULTURE OTHR SPECIMN AEROBIC: CPT | Performed by: ORTHOPAEDIC SURGERY

## 2025-03-25 RX ORDER — LIDOCAINE HYDROCHLORIDE 20 MG/ML
INJECTION, SOLUTION EPIDURAL; INFILTRATION; INTRACAUDAL; PERINEURAL
Status: DISCONTINUED | OUTPATIENT
Start: 2025-03-25 | End: 2025-03-25

## 2025-03-25 RX ORDER — MIDAZOLAM HYDROCHLORIDE 1 MG/ML
INJECTION INTRAMUSCULAR; INTRAVENOUS
Status: DISCONTINUED | OUTPATIENT
Start: 2025-03-25 | End: 2025-03-25

## 2025-03-25 RX ORDER — ONDANSETRON HYDROCHLORIDE 2 MG/ML
INJECTION, SOLUTION INTRAVENOUS
Status: DISCONTINUED | OUTPATIENT
Start: 2025-03-25 | End: 2025-03-25

## 2025-03-25 RX ORDER — DEXAMETHASONE SODIUM PHOSPHATE 4 MG/ML
INJECTION, SOLUTION INTRA-ARTICULAR; INTRALESIONAL; INTRAMUSCULAR; INTRAVENOUS; SOFT TISSUE
Status: DISCONTINUED | OUTPATIENT
Start: 2025-03-25 | End: 2025-03-25

## 2025-03-25 RX ORDER — PROPOFOL 10 MG/ML
VIAL (ML) INTRAVENOUS
Status: DISCONTINUED | OUTPATIENT
Start: 2025-03-25 | End: 2025-03-25

## 2025-03-25 RX ORDER — FENTANYL CITRATE 50 UG/ML
25 INJECTION, SOLUTION INTRAMUSCULAR; INTRAVENOUS EVERY 5 MIN PRN
Status: COMPLETED | OUTPATIENT
Start: 2025-03-25 | End: 2025-03-25

## 2025-03-25 RX ORDER — ROCURONIUM BROMIDE 10 MG/ML
INJECTION, SOLUTION INTRAVENOUS
Status: DISCONTINUED | OUTPATIENT
Start: 2025-03-25 | End: 2025-03-25

## 2025-03-25 RX ORDER — ONDANSETRON HYDROCHLORIDE 2 MG/ML
4 INJECTION, SOLUTION INTRAVENOUS DAILY PRN
Status: DISCONTINUED | OUTPATIENT
Start: 2025-03-25 | End: 2025-03-25 | Stop reason: HOSPADM

## 2025-03-25 RX ORDER — VANCOMYCIN HYDROCHLORIDE 1 G/20ML
INJECTION, POWDER, LYOPHILIZED, FOR SOLUTION INTRAVENOUS
Status: DISCONTINUED | OUTPATIENT
Start: 2025-03-25 | End: 2025-03-25 | Stop reason: HOSPADM

## 2025-03-25 RX ORDER — ACETAMINOPHEN 10 MG/ML
INJECTION, SOLUTION INTRAVENOUS
Status: DISCONTINUED | OUTPATIENT
Start: 2025-03-25 | End: 2025-03-25

## 2025-03-25 RX ORDER — GLUCAGON 1 MG
1 KIT INJECTION
Status: DISCONTINUED | OUTPATIENT
Start: 2025-03-25 | End: 2025-03-25 | Stop reason: HOSPADM

## 2025-03-25 RX ORDER — FENTANYL CITRATE 50 UG/ML
INJECTION, SOLUTION INTRAMUSCULAR; INTRAVENOUS
Status: DISCONTINUED | OUTPATIENT
Start: 2025-03-25 | End: 2025-03-25

## 2025-03-25 RX ORDER — MUPIROCIN 20 MG/G
OINTMENT TOPICAL 2 TIMES DAILY
Status: COMPLETED | OUTPATIENT
Start: 2025-03-25 | End: 2025-03-29

## 2025-03-25 RX ADMIN — MUPIROCIN: 20 OINTMENT TOPICAL at 01:03

## 2025-03-25 RX ADMIN — LIDOCAINE HYDROCHLORIDE 60 MG: 20 INJECTION, SOLUTION INTRAVENOUS at 10:03

## 2025-03-25 RX ADMIN — PROPOFOL 125 MG: 10 INJECTION, EMULSION INTRAVENOUS at 10:03

## 2025-03-25 RX ADMIN — FENTANYL CITRATE 100 MCG: 50 INJECTION, SOLUTION INTRAMUSCULAR; INTRAVENOUS at 10:03

## 2025-03-25 RX ADMIN — CEFAZOLIN SODIUM 2 G: 2 SOLUTION INTRAVENOUS at 01:03

## 2025-03-25 RX ADMIN — ONDANSETRON 4 MG: 2 INJECTION INTRAMUSCULAR; INTRAVENOUS at 10:03

## 2025-03-25 RX ADMIN — HYDROXYZINE PAMOATE 50 MG: 50 CAPSULE ORAL at 12:03

## 2025-03-25 RX ADMIN — ASPIRIN 81 MG: 81 TABLET, COATED ORAL at 01:03

## 2025-03-25 RX ADMIN — FENOFIBRATE 145 MG: 145 TABLET, FILM COATED ORAL at 01:03

## 2025-03-25 RX ADMIN — Medication 6 MG: at 08:03

## 2025-03-25 RX ADMIN — ENOXAPARIN SODIUM 40 MG: 40 INJECTION SUBCUTANEOUS at 04:03

## 2025-03-25 RX ADMIN — SODIUM CHLORIDE, SODIUM GLUCONATE, SODIUM ACETATE, POTASSIUM CHLORIDE AND MAGNESIUM CHLORIDE: 526; 502; 368; 37; 30 INJECTION, SOLUTION INTRAVENOUS at 10:03

## 2025-03-25 RX ADMIN — OXYCODONE HYDROCHLORIDE 5 MG: 5 TABLET ORAL at 04:03

## 2025-03-25 RX ADMIN — ACETAMINOPHEN 1000 MG: 10 INJECTION, SOLUTION INTRAVENOUS at 10:03

## 2025-03-25 RX ADMIN — FENTANYL CITRATE 25 MCG: 50 INJECTION, SOLUTION INTRAMUSCULAR; INTRAVENOUS at 11:03

## 2025-03-25 RX ADMIN — NALOXEGOL OXALATE 25 MG: 25 TABLET, FILM COATED ORAL at 01:03

## 2025-03-25 RX ADMIN — HYDROXYZINE PAMOATE 50 MG: 50 CAPSULE ORAL at 03:03

## 2025-03-25 RX ADMIN — OXYCODONE HYDROCHLORIDE 5 MG: 5 TABLET ORAL at 03:03

## 2025-03-25 RX ADMIN — CEFAZOLIN SODIUM 2 G: 2 SOLUTION INTRAVENOUS at 03:03

## 2025-03-25 RX ADMIN — CEFAZOLIN SODIUM 2 G: 2 SOLUTION INTRAVENOUS at 08:03

## 2025-03-25 RX ADMIN — METOPROLOL SUCCINATE 25 MG: 25 TABLET, EXTENDED RELEASE ORAL at 07:03

## 2025-03-25 RX ADMIN — ROCURONIUM BROMIDE 50 MG: 10 SOLUTION INTRAVENOUS at 10:03

## 2025-03-25 RX ADMIN — ACETAMINOPHEN 1000 MG: 500 TABLET ORAL at 01:03

## 2025-03-25 RX ADMIN — OXYCODONE HYDROCHLORIDE 5 MG: 5 TABLET ORAL at 08:03

## 2025-03-25 RX ADMIN — OXYCODONE HYDROCHLORIDE 5 MG: 5 TABLET ORAL at 12:03

## 2025-03-25 RX ADMIN — MIDAZOLAM HYDROCHLORIDE 2 MG: 1 INJECTION, SOLUTION INTRAMUSCULAR; INTRAVENOUS at 10:03

## 2025-03-25 RX ADMIN — HYDROXYZINE PAMOATE 50 MG: 50 CAPSULE ORAL at 06:03

## 2025-03-25 RX ADMIN — NICOTINE 1 PATCH: 14 PATCH TRANSDERMAL at 01:03

## 2025-03-25 RX ADMIN — MUPIROCIN: 20 OINTMENT TOPICAL at 08:03

## 2025-03-25 RX ADMIN — DEXAMETHASONE SODIUM PHOSPHATE 4 MG: 4 INJECTION, SOLUTION INTRA-ARTICULAR; INTRALESIONAL; INTRAMUSCULAR; INTRAVENOUS; SOFT TISSUE at 10:03

## 2025-03-25 NOTE — PROGRESS NOTES
Tele-Infectious Diseases Follow-Up       Patient Name: Kimani Redd  Patient : 1965  Age/Sex: 59 y.o. male  Room/Bed: 536/536 A  Admission Date/Time: 3/15/2025 12:35 AM    Date: 3/25/2025    Assessment:     Kimani Redd is a 59 y.o. male with:  MSSA bacteremia:  Source unclear  Left shoulder abscess s/p I and D at bedside 3/15 and 3/17, no cultures obtained. MRI showed AC joint septic arthritis with osteomyelitis of the distal clavicle and acromion   Leukocytosis, 2/2 above, Improving    Left shoulder MRI came back positive for septic arthritis and osteomyelitis.  Patient continues to have pain in his shoulder.  His WBC remains elevated.  He was seen again by Orthopedic surgery team and plan to go to the OR for surgical debridement.      Plan:     Plan for surgical debridement in the OR. We will follow up on OR impression/final culture results  Continue IV cefazolin 2 g every 8 hours  Anticipating 6 weeks total of IV antibiotic therapy.  Antibiotic days 3/24-  Will make follow-up appointment ID clinic  Need to follow CBC, BMP and CRP weekly    Thank you very much for allowing me to participate in the care of this patient.      ID will continue to follow. Please page with questions.    Diana Fraire MD   Attending, Infectious Disease     Reason for follow-up:      MSSA bacteremia    Summary:     Kimani Redd is a 59 y.o. male with a history significant for hypertension, tobacco use who was admitted on 3/15/2025 with left shoulder pain. Patient initially went to [facility] with left shoulder pain. There was a concern for possible septic arthritis. Patient reports no injury or trauma.  He has been having pain in his left shoulder for the last week or so. Patient received a dose of vancomycin/Zosyn at the outside hospital and then transferred here for further evaluation.  His laboratory workup was remarkable for white cell count of 25,000.  He underwent CT of his left shoulder which was  unremarkable. He was seen by Orthopedic surgery team. He underwent I&D at bedside. He had 2 sets of blood culture obtained which are growing methicillin sensitive Staphylococcus aureus. He underwent transthoracic echo which showed no clear vegetations.  He noted to have purulent drainage from his left shoulder on  and underwent I&D at bedside.  Repeated blood culture from 3/17 remains negative at  48 hours. Patient underwent left shoulder MRI on 3/20 which showed AC joint septic arthritis with osteomyelitis of the distal clavicle and acromion     Antimicrobial history:      Zosyn 3/14-3/15  Vancomycin 3/14-3/17   IV cefazolin 3/17-    24 hours events:      No acute events overnight   Remains hemodynamically stable    Subjective     Patient seen and examined, chart reviewed.  Patient said that he continues to have shoulder pain.     Review of Symptoms:     Patient reports no nausea, vomiting, diarrhea, cough, dyspnea, chest pain or palpitations    Objective     Vital signs  Vitals:    25 2105 25 0336 25 0336   BP: 120/80   131/86   BP Location:       Patient Position:       Pulse: 85   80   Resp: 18 18 17    Temp: 97.9 °F (36.6 °C)   97.8 °F (36.6 °C)   TempSrc: Oral   Oral   SpO2: 97%   96%   Weight:       Height:          Temp (24hrs), Av.1 °F (36.7 °C), Min:97.8 °F (36.6 °C), Max:98.4 °F (36.9 °C)    Physical exam:  GEN: Awake, resting comfortably  EYES: EOMI, no scleral icterus  HENT: MMM. No oral lesions. Fair dentition.  NECK: Supple, no cervical lymphadenopathy or meningismus.   CARDIO: RRR, no murmur.  PULM/CHEST: CTAB. No increased work of breathing  ABD: Normal bowel sounds. Soft, not tender or distended. No HSM appreciated.  MSK: no obvious effusion, swelling, increased warmth, or erythema of major joints. No pedal edema.  SKIN: No rashes. No stigmata of endocarditis.  NEURO: AOx3. Speech fluent. Face symmetric.  PSYCH: Mood appropriate         Diagnostic Test      CBC, INR  Recent Labs   Lab 03/19/25  0340 03/20/25  0520 03/21/25  0345 03/23/25  0901 03/25/25  0327   WBC 15.46* 17.31* 15.55* 13.46* 13.91*   HGB 14.8 14.8 15.2 14.9 15.6   * 706* 726* 847* 740*       BMP  Recent Labs   Lab 03/19/25  0340 03/20/25  0520 03/21/25  0744 03/23/25  0900 03/24/25  1042    135* 137 139  --    K 5.4* 5.2* 4.1 5.3* 4.3    102 102 104  --    CO2 23 23 27 26  --    BUN 19.4 17.9 17.7 20.1  --    CREATININE 0.67* 0.67* 0.70* 0.71*  --    CALCIUM 8.9 9.1 9.1 9.4  --    MG 2.10 2.00 2.00  --   --      Recent Labs   Lab 03/21/25  0345 03/23/25  0901 03/25/25  0327   WBC 15.55* 13.46* 13.91*   HGB 15.2 14.9 15.6   HCT 45.5 45.1 46.4   * 847* 740*     Estimated Creatinine Clearance: 108.5 mL/min (A) (based on SCr of 0.71 mg/dL (L)).    Lab Results   Component Value Date    CREATININE 0.71 (L) 03/23/2025    CREATININE 0.70 (L) 03/21/2025    CREATININE 0.67 (L) 03/20/2025    ALKPHOS 121 03/21/2025    ALKPHOS 123 03/15/2025    ALKPHOS 123 03/14/2025        Microbiology and ID tests:     Microbiology Results (last 7 days)       Procedure Component Value Units Date/Time    Blood Culture [[id number]]  (Normal) Collected: 03/17/25 1602    Order Status: Completed Specimen: Blood Updated: 03/22/25 2001     Blood Culture No Growth at 5 days    Blood Culture [[id number]]  (Normal) Collected: 03/17/25 1602    Order Status: Completed Specimen: Blood Updated: 03/22/25 2001     Blood Culture No Growth at 5 days              Medications   Inpatient  Scheduled Meds:   aspirin  81 mg Oral Daily    ceFAZolin (Ancef) IV (PEDS and ADULTS)  2 g Intravenous Q8H    enoxparin  40 mg Subcutaneous Q24H (prophylaxis, 1700)    fenofibrate  145 mg Oral Daily    metoprolol succinate  25 mg Oral Daily    naloxegoL  25 mg Oral Daily    nicotine  1 patch Transdermal Daily     Continuous Infusions:  PRN Meds:.  Current Facility-Administered Medications:     acetaminophen, 1,000 mg, Oral, Q6H PRN     aluminum-magnesium hydroxide-simethicone, 30 mL, Oral, QID PRN    bisacodyL, 10 mg, Rectal, Daily PRN    dextrose 50%, 12.5 g, Intravenous, PRN    dextrose 50%, 25 g, Intravenous, PRN    glucagon (human recombinant), 1 mg, Intramuscular, PRN    glucose, 16 g, Oral, PRN    glucose, 24 g, Oral, PRN    hydrOXYzine pamoate, 50 mg, Oral, Q6H PRN    melatonin, 6 mg, Oral, Nightly PRN    naloxone, 0.02 mg, Intravenous, PRN    ondansetron, 4 mg, Intravenous, Q4H PRN    oxyCODONE, 5 mg, Oral, Q4H PRN    prochlorperazine, 5 mg, Intravenous, Q6H PRN    senna-docusate 8.6-50 mg, 1 tablet, Oral, BID PRN    sodium chloride 0.9%, 10 mL, Intravenous, PRN    Flushing PICC/Midline Protocol, , , Until Discontinued **AND** sodium chloride 0.9%, 10 mL, Intravenous, Q12H PRN    Home Meds  Current Outpatient Medications   Medication Instructions    aspirin (ECOTRIN) 81 mg, Daily    diclofenac (VOLTAREN) 50 mg, Oral, 3 times daily PRN    fenofibrate 160 mg, Oral, Daily    gabapentin (NEURONTIN) 300 mg, Oral, 3 times daily    LIDOcaine (LIDODERM) 5 % 1 patch, Transdermal, Daily, Remove & Discard patch within 12 hours or as directed by MD    metoprolol tartrate (LOPRESSOR) 12.5 mg, Oral, Daily    predniSONE (DELTASONE) 20 mg, Oral, Daily    simvastatin (ZOCOR) 40 mg, Oral, Nightly       Imaging (personally reviewed):     MRI Shoulder W WO Contrast Left   Final Result      AC joint septic arthritis with osteomyelitis of the distal clavicle and acromion.         Electronically signed by: Andrea Orellana   Date:    03/21/2025   Time:    16:44      X-Ray Chest 1 View for Line/Tube Placement   Final Result      Right PICC tip overlies the distal SVC.         Electronically signed by: Pola Kerr   Date:    03/20/2025   Time:    13:17            3/25/2025 8:30 AM    The attending portion of this evaluation, treatment, and documentation was performed per Diana Fraire MD via Telemedicine AudioVisual using the secure Vid[employer] software platform with 2  way audio/video. The provider was located off-site and the patient is located in the hospital. The aforementioned video software was utilized to document the relevant history and physical exam.     Critical care time spent: >35 minutes

## 2025-03-25 NOTE — PROGRESS NOTES
[facility] Medicine Progress Note        Chief Complaint: Inpatient Follow-up for left shoulder septic joint    HPI per admitting team: Kimani Redd is a 59 y.o. male who  has a past medical history of Kidney stone.. The patient presented to [facility] on 3/15/2025 with a primary complaint of left shoulder pain.  Patient arrived from an outlying facility for concern of septic arthritis and abscess over the left shoulder.  Patient has not had no recent injury to his left shoulder or prior surgery.  Patient noted a bump that got worsened along with redness and warmth.  Status post I&D done in the ER.  Patient notes improvement in his shoulder pain and symptoms after I&D.  Labs were significant for leukocytosis along with the elevated inflammatory markers.  Patient was admitted to us for further workup   Left shoulder abscess was I and D on March 15th and that showed MSSA, patient had repeat bedside I and D with Orthopedics blood culture also grew MSSA Infectious diseases following and we are continuing with cefazolin SHRUTHI done on March 20th was negative for any kind of vegetation patient is still having a lot of purulent drainage  MRI showed AC joint septic arthritis with osteomyelitis of the distal clavicle and acromion  Orthopedics is planning to take the patient for washout tomorrow.  White cell count is trending down  Interval Hx:     Patient seen and examined this morning  they were about to take him for the washout this morning family and nursing staff bedside  Objective/physical exam:  General: In no acute distress, afebrile, uncomfortable with left shoulder pain  Chest: Clear to auscultation bilaterally anteriorly  Heart:  Regular rate and rhythm, +S1, S2, no appreciable murmur  Abdomen: Soft, nontender, BS +  MSK:  Dressing left shoulder, limited range of motion  Neurologic: Alert and oriented x4,  VITAL SIGNS: 24 HRS MIN & MAX LAST   Temp  Min: 97.8 °F (36.6 °C)  Max: 98.4  °F (36.9 °C) 97.9 °F (36.6 °C)   BP  Min: 120/80  Max: 148/84 (!) 148/84   Pulse  Min: 70  Max: 85  72   Resp  Min: 17  Max: 18 18   SpO2  Min: 96 %  Max: 98 % 97 % (RA)     I have reviewed the following labs:  Recent Labs   Lab 03/21/25  0345 03/23/25  0901 03/25/25  0327   WBC 15.55* 13.46* 13.91*   RBC 5.11 5.06 5.33   HGB 15.2 14.9 15.6   HCT 45.5 45.1 46.4   MCV 89.0 89.1 87.1   MCH 29.7 29.4 29.3   MCHC 33.4 33.0 33.6   RDW 12.1 12.1 11.8   * 847* 740*   MPV 10.0 9.9 9.6     Recent Labs   Lab 03/19/25  0340 03/20/25  0520 03/21/25  0744 03/23/25  0900 03/24/25  1042    135* 137 139  --    K 5.4* 5.2* 4.1 5.3* 4.3    102 102 104  --    CO2 23 23 27 26  --    BUN 19.4 17.9 17.7 20.1  --    CREATININE 0.67* 0.67* 0.70* 0.71*  --    CALCIUM 8.9 9.1 9.1 9.4  --    MG 2.10 2.00 2.00  --   --    ALBUMIN  --   --  2.7*  --   --    ALKPHOS  --   --  121  --   --    ALT  --   --  16  --   --    AST  --   --  14  --   --    BILITOT  --   --  0.3  --   --      Microbiology Results (last 7 days)       Procedure Component Value Units Date/Time    Blood Culture [[id number]]  (Normal) Collected: 03/17/25 1602    Order Status: Completed Specimen: Blood Updated: 03/22/25 2001     Blood Culture No Growth at 5 days    Blood Culture [[id number]]  (Normal) Collected: 03/17/25 1602    Order Status: Completed Specimen: Blood Updated: 03/22/25 2001     Blood Culture No Growth at 5 days             See below for Radiology    Intake/Output:  Intake/Output Summary (Last 24 hours) at 3/25/2025 0812  Last data filed at 3/24/2025 1600  Gross per 24 hour   Intake 120 ml   Output --   Net 120 ml              Assessment/Plan:  Left AC joint septic arthritis with osteomyelitis of the distal clavicle and acromion  MSSA bacteremia likely secondary to left shoulder abscess  Left shoulder abscess status post I and D - 03/15--> MSSA  Essential hypertension  History of MI/CAD, CMO 45% with RWMA, DD  Tobacco use  Mild hyperkalemia        Plan for washout and intraoperative cultures today   Continue with cefazolin  Emigdio as such was negative for any vegetation mildly reduced EF of 45%.  There is diastolic dysfunction.  Right ventricular systolic function Ortho on board, appreciate recs, continue IV antibiotics, No indication for operative intervention for septic arthritis of the left shoulder, patient is still having a lot of purulent drainage  3/15- left shoulder abscess culture -MSSA  3/17- repeat bedside I and D per orthopedic  2/2 blood cultures - MSSA  3/17- Repeat Blood cx x 2- negative at 24 hours  Appropriate home medication for chronic medical condition has been resumed  Repeat blood work in a.m.  Cell count of 13.9  All other vitals have been stable          CM on board, , awaiting financial counselor to meet with him to see if he will be eligible for Medicaid.  If he is approved, he can go home with IV antibiotic otherwise plan to transfer to [facility] given patient's sister is an RN working with Dr. Rain- ID    VTE prophylaxis:  Lovenox    Patient condition:  Fair    Anticipated discharge and Disposition:  TBD, home with IV antibiotics if Medicaid approved versus transferred to [facility] where his sister works with ID department      All diagnosis and differential diagnosis have been reviewed; assessment and plan has been documented; I have personally reviewed the labs and test results that are presently available; I have reviewed the patients medication list; I have reviewed the consulting providers response and recommendations. I have reviewed or attempted to review medical records based upon their availability    All of the patient's questions have been  addressed and answered. Patient's is agreeable to the above stated plan. I will continue to monitor closely and make adjustments to medical management as needed.    Portions of this note dictated using EMR integrated voice recognition software, and may be subject to voice recognition errors  not corrected at proofreading. Please contact writer for clarification if needed.   _____________________________________________________________________    Malnutrition Status:  Nutrition consulted. Most recent weight and BMI monitored-     Measurements:  Wt Readings from Last 1 Encounters:   03/15/25 74.8 kg (165 lb)   Body mass index is 27.46 kg/m².    Patient has been screened and assessed by RD.    Malnutrition Type:  Context:    Level:      Malnutrition Characteristic Summary:       Interventions/Recommendations (treatment strategy):        Scheduled Med:   aspirin  81 mg Oral Daily    ceFAZolin (Ancef) IV (PEDS and ADULTS)  2 g Intravenous Q8H    enoxparin  40 mg Subcutaneous Q24H (prophylaxis, 1700)    fenofibrate  145 mg Oral Daily    metoprolol succinate  25 mg Oral Daily    naloxegoL  25 mg Oral Daily    nicotine  1 patch Transdermal Daily      Continuous Infusions:     PRN Meds:  Current Facility-Administered Medications:     acetaminophen, 1,000 mg, Oral, Q6H PRN    aluminum-magnesium hydroxide-simethicone, 30 mL, Oral, QID PRN    bisacodyL, 10 mg, Rectal, Daily PRN    dextrose 50%, 12.5 g, Intravenous, PRN    dextrose 50%, 25 g, Intravenous, PRN    glucagon (human recombinant), 1 mg, Intramuscular, PRN    glucose, 16 g, Oral, PRN    glucose, 24 g, Oral, PRN    hydrOXYzine pamoate, 50 mg, Oral, Q6H PRN    melatonin, 6 mg, Oral, Nightly PRN    naloxone, 0.02 mg, Intravenous, PRN    ondansetron, 4 mg, Intravenous, Q4H PRN    oxyCODONE, 5 mg, Oral, Q4H PRN    prochlorperazine, 5 mg, Intravenous, Q6H PRN    senna-docusate 8.6-50 mg, 1 tablet, Oral, BID PRN    sodium chloride 0.9%, 10 mL, Intravenous, PRN    Flushing PICC/Midline Protocol, , , Until Discontinued **AND** sodium chloride 0.9%, 10 mL, Intravenous, Q12H PRN     Radiology:  I have personally reviewed the following imaging and agree with the radiologist.     MRI Shoulder W WO Contrast Left  Narrative: EXAMINATION:  MRI SHOULDER W WO CONTRAST  LEFT    CLINICAL HISTORY:  Staph Aureus bacteremia, on going left shoulder pain with purulent drainage coming out, rule out septic arthritis/osteomyelitis;    TECHNIQUE:  Multisequence multiplanar MR imaging of the left shoulder before and after IV contrast.    COMPARISON:  CT 03/14/2025    FINDINGS:  Abnormal marrow signal involving the distal clavicle and acromion.  Small volume fluid in the AC joint with synovial enhancement.  Fluid extends into the subcutaneous tissues with adjacent inflammation.  Fluid likely extends to the skin surface.  No significant glenohumeral joint fluid or suspicious signal changes of the humerus.  Possible small posterior labral tear versus motion artifact.  Impression: AC joint septic arthritis with osteomyelitis of the distal clavicle and acromion.    Electronically signed by: Andrea Orellana  Date:    03/21/2025  Time:    16:44      Mirian Recio MD  Department of Hospital Medicine   [facility]   03/25/2025

## 2025-03-25 NOTE — OP NOTE
[facility]                       [address] [id number]    PATIENT NAME:      JANAE REDD   YOB: 1965  CSN:               [id number]  MRN:               [id number]  ADMIT DATE:        03/15/2025 00:35:00  PHYSICIAN:         Ernesto Jefferson MD                          OPERATIVE REPORT      DATE OF SURGERY:    03/25/2025 00:00:00    SURGEON:  Ernesto Jefferson MD    PREOPERATIVE DIAGNOSES:    1. Left shoulder abscess.  2. Left distal clavicle osteomyelitis.    POSTOPERATIVE DIAGNOSES:    1. Left shoulder abscess.  2. Left distal clavicle osteomyelitis.    PROCEDURES:    1.  Debridement of clavicle distal for osteomyelitis, CPT 92369.  2. Incision and drainage of left shoulder abscess, CPT 26587.    ANESTHESIA:  General.    ESTIMATED BLOOD LOSS:  50 cc.    ASSISTANT:  Jodi Dennis NP, necessary for a skilled set of hands to assist   with deep retraction as well as excision of the clavicle and layered closure.    COMPLICATIONS:  None.    COUNTS:  All counts correct x2 at the end of the case.    INDICATIONS FOR PROCEDURE:  Mr. Redd is a 59-year-old male, who presented to   the ED with a left shoulder abscess.  It was lanced in the ED.  He was started   on IV antibiotics.  He had continued purulent drainage.  He had a bedside   incision and drainage performed with packing.  He has continued IV antibiotics.    He has had some slight improvement in his symptoms.  He has no spreading   erythema; however, it is not progressing as expected and he has persistent   elevation in his white blood cell count.  An MRI was obtained over the weekend,   which demonstrated signal change in the distal clavicle indicating involvement   of the bone under his superficial abscess.  The risks and benefits of treatment   were discussed.  He is brought to the operating room today for further I and D   of the area along with  excision of his distal clavicle.    PROCEDURE IN DETAIL:  After informed consent was obtained, the patient was met   in the preoperative holding area and the site was marked.  He was taken to the   operating room, placed supine on the operating table, and general anesthesia was   induced.  All bony prominences were well padded.  Preoperative antibiotics were   given.  His left shoulder was prepped and draped in a standard sterile fashion.    A time-out was done indicating correct operative limb and procedure.  He is   currently on scheduled antibiotics, which were provided.  A time-out was done   indicating the correct operative site and procedure.  We did not need to extend   his incision proximally or distally from his previous bedside irrigation.  We   did ellipse the wound edges.  We then spread underneath the soft tissues into   the muscle around the distal end of the clavicle.  He had some purulence   inferior to our previous debridement.  This was opened, spread, washed, and   irrigated well.  Cultures were obtained from this area.  The muscle was debrided   using a rongeur.  We were able to debride the soft tissues down to the distal   clavicle.  We were able to localize the distal clavicle.  Under fluoroscopic   guidance, an oscillating saw was used to perform distal clavicle excision.  The   distal clavicle was excised in its entirety utilizing a rongeur.  Once the cut   was made with the saw, we scraped and cleaned the cartilage from the AC joint   using a rongeur and curette.  This area was cleaned.  It was then thoroughly   irrigated with 3 L of normal saline as well as 500 cc of antimicrobial solution.    No purulence was expressed from around the abscess.  A deep suture was placed   in the deltoid fascia closing down the dead space over the clavicle excision   using absorbable monofilament suture, followed by 2 Prolene sutures in the skin   closing down the open space from his previous I and D.  The  remainder of the   irregular shaped wound over the shoulder was then packed with half-inch iodoform   packing after a gram of vancomycin was distributed at the base of the wound.  A   pressure dressing was applied with a stack of the 4x4s and foam tape.  The   patient was awakened, extubated, and taken to Recovery in stable condition.    POSTOPERATIVE PLAN:  We will monitor the cultures obtained today to see if there   is any new growth or new bacteria.  Continue IV antibiotics per Infectious   Disease recommendations.  Distal clavicle is now excised in his AC joint has   been debrided.  We will continue local wound care with continued packing and   monitoring.  No overhead activities.  Okay to weightbear for ADLs.        ______________________________  MD CATRACHITA Betancur/EUNICE  DD:  03/25/2025  Time:  11:16AM  DT:  03/25/2025  Time:  11:48AM  Job #:  [id number]      OPERATIVE REPORT

## 2025-03-25 NOTE — BRIEF OP NOTE
[facility] - Periop Services  Brief Operative Note    SUMMARY     Surgery Date: 3/25/2025     Surgeons and Role:     * Ernesto Jefferson MD - Primary    Assisting Surgeon: None    Pre-op Diagnosis:  Left shoulder abscess  Osteomyelitis distal clavicle    Post-op Diagnosis:  same    Procedure(s) (LRB):  Excision of Left distal clavicle for osteomyelitis- 75449  INCISION AND DRAINAGE, UPPER EXTREMITY abscess (Left)- 94842    Anesthesia: General    Implants:  * No implants in log *    Operative Findings: see op report    Estimated Blood Loss: 50 mL    Estimated Blood Loss has been documented.         Specimens:   Specimen (24h ago, onward)       Start     Ordered    03/25/25 1040  Specimen to Pathology  RELEASE UPON ORDERING        References:    Click here for ordering Quick Tip   Question:  Release to patient  Answer:  Immediate    03/25/25 1040                  ID Type Source Tests Collected by Time Destination   A : left clavicle abscess Tissue Clavicle, Left SPECIMEN TO PATHOLOGY, ANAEROBIC CULTURE [facility], FUNGAL CULTURE [facility], GRAM STAIN [facility], TISSUE CULTURE - AEROBIC [facility] Ernesto Jefferson MD 3/25/2025 1039        [id number]  A/P: Tolerated procedure well. Admit to floor. WBAT to LUE for ADLs. No overhead activity. Monitor cultures, cont. IV abx per ID. Distal clavicle excised and partial wound closure performed. Packing reapplied. Continue local wound care daily.       Ernesto Jefferson MD  Orthopedic Trauma  [facility]

## 2025-03-25 NOTE — NURSING
Patient refused his midnight vital signs so that he would have uninterrupted sleep. On call FNP, Lacy Bowers was notified. Vital signs will resume at 0400, will continue to monitor.

## 2025-03-25 NOTE — TRANSFER OF CARE
"Anesthesia Transfer of Care Note    Patient: Kimani Redd    Procedure(s) Performed: Procedure(s) (LRB):  INCISION AND DRAINAGE, UPPER EXTREMITY (Left)    Patient location: PACU    Anesthesia Type: general    Transport from OR: Transported from OR on room air with adequate spontaneous ventilation    Post pain: adequate analgesia    Post assessment: no apparent anesthetic complications    Post vital signs: stable    Level of consciousness: sedated    Nausea/Vomiting: no nausea/vomiting    Complications: none    Transfer of care protocol was followed    Last vitals: Visit Vitals  /70   Pulse 73   Temp 36.7 °C (98.1 °F) (Temporal)   Resp 15   Ht 5' 5" (1.651 m)   Wt 74.8 kg (165 lb)   SpO2 95%   BMI 27.46 kg/m²     "

## 2025-03-25 NOTE — PROGRESS NOTES
Patient Name: Kimani Redd  MRN: [id number]  Admission Date: 3/15/2025  Hospital Length of Stay: 10 days  Attending Provider: Mirian Recio MD  Primary Care Provider: Jessi Leyva FNP  Follow-up For: Procedure(s) (LRB):  INCISION AND DRAINAGE, UPPER EXTREMITY (Left)    Post-Operative Day: * Day of Surgery *  Subjective:       Principal Orthopedic Problem: * Day of Surgery *       Interval History:  No acute issues overnight.  Ready for surgery today.  Had dressing changes yesterday with wound care.  Reports continued pain in his shoulder.  He feels as though it is getting better and he has less drainage.    Review of patient's allergies indicates:  No Known Allergies    Current Facility-Administered Medications   Medication    acetaminophen tablet 1,000 mg    aluminum-magnesium hydroxide-simethicone 200-200-20 mg/5 mL suspension 30 mL    aspirin EC tablet 81 mg    bisacodyL suppository 10 mg    cefazolin (ANCEF) 2 gram in dextrose 5% 50 mL IVPB (premix)    dextrose 50% injection 12.5 g    dextrose 50% injection 25 g    enoxaparin injection 40 mg    fenofibrate tablet 145 mg    glucagon (human recombinant) injection 1 mg    glucose chewable tablet 16 g    glucose chewable tablet 24 g    hydrOXYzine pamoate capsule 50 mg    melatonin tablet 6 mg    metoprolol succinate (TOPROL-XL) 24 hr tablet 25 mg    naloxegoL (MOVANTIK) tablet 25 mg    naloxone 0.4 mg/mL injection 0.02 mg    nicotine 14 mg/24 hr 1 patch    ondansetron injection 4 mg    oxyCODONE immediate release tablet 5 mg    prochlorperazine injection Soln 5 mg    senna-docusate 8.6-50 mg per tablet 1 tablet    sodium chloride 0.9% flush 10 mL    sodium chloride 0.9% flush 10 mL     Objective:     Vital Signs (Most Recent):  Temp: 97.9 °F (36.6 °C) (03/25/25 0700)  Pulse: 72 (03/25/25 0800)  Resp: 18 (03/25/25 0800)  BP: (!) 148/84 (03/25/25 0800)  SpO2: 97 % (RA) (03/25/25 0800) Vital Signs (24h Range):  Temp:  [97.8 °F (36.6 °C)-98.4 °F (36.9 °C)]  "97.9 °F (36.6 °C)  Pulse:  [70-85] 72  Resp:  [17-18] 18  SpO2:  [96 %-98 %] 97 %  BP: (120-148)/(73-86) 148/84     Weight: 74.8 kg (165 lb)  Height: 5' 5" (165.1 cm)  Body mass index is 27.46 kg/m².      Intake/Output Summary (Last 24 hours) at 3/25/2025 1011  Last data filed at 3/24/2025 1600  Gross per 24 hour   Intake 120 ml   Output --   Net 120 ml       Physical Exam:   Ortho/SPM Exam    General the patient is alert and oriented x3 no acute distress nontoxic-appearing appropriate affect.    Constitutional: Vital signs are examined and stable.  Resp: No signs of labored breathing    Musculoskeletal Exam:  Left upper extremity:  Dressing with spots of serous drainage.  Thick fibrinous exudate in the base of the wound.  Packing in place, left in place today.  Local erythema not spreading from the I&D site remains neurovascularly intact distally.  No pain with passive range motion of the shoulder.    Diagnostic Findings:   Significant Labs: BMP:   Recent Labs   Lab 03/24/25  1042   K 4.3     CBC:   Recent Labs   Lab 03/25/25  0327   WBC 13.91*   HGB 15.6   HCT 46.4   *     CMP:   Recent Labs   Lab 03/24/25  1042   K 4.3     All pertinent labs within the past 24 hours have been reviewed.        Significant Imaging: I have reviewed and interpreted all pertinent imaging results/findings.     Assessment/Plan:     There are no hospital problems to display for this patient.    The risks, benefits and alternatives treatment were discussed at length with the patient today including but not limited to pain, bleeding, scarring, infection, damage to neurovascular structures, need for future procedures and complications leading to amputation and even death.  Plan for operative debridement of his abscess with excision of his distal clavicle today.  Continue IV antibiotics.  New cultures will be obtained today in the operating room.  He understands and agrees with our plan all questions and concerns were addressed.  "     This note/OR report was created with the assistance of  voice recognition software or phone  dictation.  There may be transcription errors as a result of using this technology however minimal. Effort has been made to assure accuracy of transcription but any obvious errors or omissions should be clarified with the author of the document.           Ernesto Jefferson MD  Orthopedic Trauma Surgery  [facility] - Periop Services

## 2025-03-25 NOTE — NURSING
Pt left the floor for surgery. Tele off, no dentures present, glasses on bedside table with wife. No signs of distress. Alert and oriented. All questions answered.

## 2025-03-25 NOTE — ANESTHESIA POSTPROCEDURE EVALUATION
Anesthesia Post Evaluation    Patient: Kimani Redd    Procedure(s) Performed: Procedure(s) (LRB):  INCISION AND DRAINAGE, UPPER EXTREMITY (Left)    Final Anesthesia Type: general      Patient location during evaluation: PACU  Patient participation: Yes- Able to Participate  Level of consciousness: awake and alert  Post-procedure vital signs: reviewed and stable  Pain management: adequate  Airway patency: patent    PONV status at discharge: No PONV  Anesthetic complications: no      Cardiovascular status: hemodynamically stable  Respiratory status: spontaneous ventilation and room air  Hydration status: euvolemic  Follow-up not needed.              Vitals Value Taken Time   /81 03/25/25 11:41   Temp 36.8 °C (98.3 °F) 03/25/25 11:52   Pulse 74 03/25/25 11:52   Resp 18 03/25/25 12:00   SpO2 97 % 03/25/25 11:52   Vitals shown include unfiled device data.      Event Time   Out of Recovery 03/25/2025 11:45:00         Pain/Deon Score: Pain Rating Prior to Med Admin: 8 (3/25/2025 12:00 PM)  Pain Rating Post Med Admin: 6 (3/24/2025  5:47 PM)  Deon Score: 10 (3/25/2025 11:45 AM)

## 2025-03-25 NOTE — ANESTHESIA PREPROCEDURE EVALUATION
"                                                                                                             03/25/2025  Kimani Redd is a 59 y.o., male.  Pre-operative evaluation for * No procedures listed *    NPO  Date of last solid: 03/24/25  Time of last solid: 2100  Date of last liquid: 03/25/25  Time of last liquid: 0750  Contents of Last Fluid Intake: sip of water with meds    BP (!) 148/84 (BP Location: Right leg, Patient Position: Lying)   Pulse 72   Temp 36.6 °C (97.9 °F) (Oral)   Resp 18   Ht 5' 5" (1.651 m)   Wt 74.8 kg (165 lb)   SpO2 97% Comment: RA  BMI 27.46 kg/m²     Past Medical History:   Diagnosis Date    Kidney stone     passed 2 weeks ago       Problem List[1]    Review of patient's allergies indicates:  No Known Allergies    Current Outpatient Medications   Medication Instructions    aspirin (ECOTRIN) 81 mg, Daily    diclofenac (VOLTAREN) 50 mg, Oral, 3 times daily PRN    fenofibrate 160 mg, Oral, Daily    gabapentin (NEURONTIN) 300 mg, Oral, 3 times daily    LIDOcaine (LIDODERM) 5 % 1 patch, Transdermal, Daily, Remove & Discard patch within 12 hours or as directed by MD    metoprolol tartrate (LOPRESSOR) 12.5 mg, Oral, Daily    predniSONE (DELTASONE) 20 mg, Oral, Daily    simvastatin (ZOCOR) 40 mg, Oral, Nightly       Past Surgical History:   Procedure Laterality Date    CAROTID STENT           Lab Results   Component Value Date    WBC 13.91 (H) 03/25/2025    HGB 15.6 03/25/2025    HCT 46.4 03/25/2025    MCV 87.1 03/25/2025     (H) 03/25/2025          BMP  Lab Results   Component Value Date     03/23/2025    K 4.3 03/24/2025    CO2 26 03/23/2025    GLUCOSE 89 03/23/2025    BUN 20.1 03/23/2025    CREATININE 0.71 (L) 03/23/2025    CALCIUM 9.4 03/23/2025    BILITOT 0.3 03/21/2025    AST 14 03/21/2025    ALT 16 03/21/2025        INR  No results for input(s): "PT", "INR", "PROTIME", "APTT" in the last 72 hours.    No results found for: "PREGTESTUR", "PREGSERUM", "HCG", " ""HCGQUANT"        EKG:  Results for orders placed or performed during the hospital encounter of 03/15/25   EKG 12-lead    Collection Time: 03/18/25 12:44 PM   Result Value Ref Range    QRS Duration 100 ms    [facility] QTC Calculation 442 ms    Narrative    Test Reason : I42.9,    Vent. Rate :  89 BPM     Atrial Rate :  89 BPM     P-R Int : 124 ms          QRS Dur : 100 ms      QT Int : 364 ms       P-R-T Axes :  82 106  63 degrees    QTcB Int : 442 ms    Normal sinus rhythm  Rightward axis  Minimal voltage criteria for LVH, may be normal variant ( [place] product )  Cannot rule out Anterior infarct ,age undetermined  Abnormal ECG  No previous ECGs available  Confirmed by Paulino Fritz ([id number]) on 3/18/2025 2:04:08 PM    Referred By: EUGENIO COX           Confirmed By: Paulino Fritz       Results for orders placed during the hospital encounter of 03/15/25    Echo    Interpretation Summary    Left Ventricle: The left ventricle is normal in size. There is concentric remodeling. Regional wall motion abnormalities present. Inferior and inferior lateral walls are hypokinetic. There is mildly reduced systolic function with a visually estimated ejection fraction of 45%. There is diastolic dysfunction.    Right Ventricle: The right ventricle is normal in size. Systolic function is normal.    Aortic Valve: The aortic valve is a trileaflet valve. Moderately calcified cusps. Mildly restricted motion. There is mild stenosis. Aortic valve area by VTI is 1.4 cm². Aortic valve peak velocity is 2.5 m/s. Mean gradient is 16 mmHg. The dimensionless index is 0.41. There is trace aortic regurgitation.    Mitral Valve: There is no significant regurgitation.    Tricuspid Valve: There is no significant regurgitation.    Pericardium: There is no pericardial effusion.    No obvious evidence of valvular vegetations.    Can consider SHRUTHI to further evaluate for infective endocarditis if clinically indicated.            Pre-op Assessment    I have " reviewed the Patient Summary Reports.    I have reviewed the NPO Status.   I have reviewed the Medications.     Review of Systems  Anesthesia Hx:  No problems with previous Anesthesia             Denies Family Hx of Anesthesia complications.    Denies Personal Hx of Anesthesia complications.                    Social:  Non-Smoker       Cardiovascular:     Hypertension, well controlled  Past MI (15 yrs ago)    CABG/stent (2 stents 15 yrs ago - well since)            No Cardiac Complaints                           Pulmonary:  Pulmonary Normal        No Pulmonary Complaints               Hepatic/GI:        No Current GERD Sx             Musculoskeletal:  Arthritis               Neurological:  Neurology Normal                                      Endocrine:  Endocrine Normal            Psych:  Psychiatric History  depression                Physical Exam  General: Alert, Oriented, Cooperative and Well nourished    Airway:  Mallampati: II   Mouth Opening: Normal  TM Distance: Normal  Neck ROM: Normal ROM    Dental:  Intact    Chest/Lungs:  Clear to auscultation, Normal Respiratory Rate    Heart:  Rate: Normal  Rhythm: Regular Rhythm        Anesthesia Plan  Type of Anesthesia, risks & benefits discussed:    Anesthesia Type: Gen ETT  Intra-op Monitoring Plan: Standard ASA Monitors  Post Op Pain Control Plan: multimodal analgesia and IV/PO Opioids PRN  Induction:  IV  Airway Plan: Direct, Post-Induction  Informed Consent: Informed consent signed with the Patient and all parties understand the risks and agree with anesthesia plan.  All questions answered.   ASA Score: 3  Day of Surgery Review of History & Physical: H&P Update referred to the surgeon/provider.  Anesthesia Plan Notes:       Ready For Surgery From Anesthesia Perspective.     .           [1]   Patient Active Problem List  Diagnosis    Encounter to establish care    Hypercholesteremia    Hypertriglyceridemia    Hypertension    Depression

## 2025-03-25 NOTE — ANESTHESIA PROCEDURE NOTES
Intubation    Date/Time: 3/25/2025 10:16 AM    Performed by: Liana Prakash CRNA  Authorized by: Colten Gifford MD    Intubation:     Induction:  Intravenous    Intubated:  Postinduction    Mask Ventilation:  Easy mask    Attempts:  1    Attempted By:  CRNA    Method of Intubation:  Direct    Blade:  Gonzalez 2    Laryngeal View Grade: Grade I - full view of cords      Difficult Airway Encountered?: No      Complications:  None    Airway Device:  Oral endotracheal tube    Airway Device Size:  7.0    Style/Cuff Inflation:  Cuffed (inflated to minimal occlusive pressure)    Inflation Amount (mL):  5    Tube secured:  22    Secured at:  The lips    Placement Verified By:  Capnometry    Complicating Factors:  None    Findings Post-Intubation:  BS equal bilateral and atraumatic/condition of teeth unchanged

## 2025-03-26 PROCEDURE — S4991 NICOTINE PATCH NONLEGEND: HCPCS | Performed by: STUDENT IN AN ORGANIZED HEALTH CARE EDUCATION/TRAINING PROGRAM

## 2025-03-26 PROCEDURE — 25000003 PHARM REV CODE 250: Performed by: INTERNAL MEDICINE

## 2025-03-26 PROCEDURE — 63600175 PHARM REV CODE 636 W HCPCS: Performed by: INTERNAL MEDICINE

## 2025-03-26 PROCEDURE — 25000003 PHARM REV CODE 250: Performed by: STUDENT IN AN ORGANIZED HEALTH CARE EDUCATION/TRAINING PROGRAM

## 2025-03-26 PROCEDURE — 63600175 PHARM REV CODE 636 W HCPCS: Performed by: STUDENT IN AN ORGANIZED HEALTH CARE EDUCATION/TRAINING PROGRAM

## 2025-03-26 PROCEDURE — 21400001 HC TELEMETRY ROOM

## 2025-03-26 PROCEDURE — 25000003 PHARM REV CODE 250: Performed by: NURSE PRACTITIONER

## 2025-03-26 PROCEDURE — 11000001 HC ACUTE MED/SURG PRIVATE ROOM

## 2025-03-26 RX ADMIN — ASPIRIN 81 MG: 81 TABLET, COATED ORAL at 08:03

## 2025-03-26 RX ADMIN — FENOFIBRATE 145 MG: 145 TABLET, FILM COATED ORAL at 08:03

## 2025-03-26 RX ADMIN — HYDROXYZINE PAMOATE 50 MG: 50 CAPSULE ORAL at 01:03

## 2025-03-26 RX ADMIN — HYDROXYZINE PAMOATE 50 MG: 50 CAPSULE ORAL at 08:03

## 2025-03-26 RX ADMIN — MUPIROCIN: 20 OINTMENT TOPICAL at 08:03

## 2025-03-26 RX ADMIN — CEFAZOLIN SODIUM 2 G: 2 SOLUTION INTRAVENOUS at 04:03

## 2025-03-26 RX ADMIN — MUPIROCIN: 20 OINTMENT TOPICAL at 09:03

## 2025-03-26 RX ADMIN — CEFAZOLIN SODIUM 2 G: 2 SOLUTION INTRAVENOUS at 08:03

## 2025-03-26 RX ADMIN — CEFAZOLIN SODIUM 2 G: 2 SOLUTION INTRAVENOUS at 12:03

## 2025-03-26 RX ADMIN — NALOXEGOL OXALATE 25 MG: 25 TABLET, FILM COATED ORAL at 08:03

## 2025-03-26 RX ADMIN — OXYCODONE HYDROCHLORIDE 5 MG: 5 TABLET ORAL at 01:03

## 2025-03-26 RX ADMIN — OXYCODONE HYDROCHLORIDE 5 MG: 5 TABLET ORAL at 09:03

## 2025-03-26 RX ADMIN — ENOXAPARIN SODIUM 40 MG: 40 INJECTION SUBCUTANEOUS at 05:03

## 2025-03-26 RX ADMIN — HYDROXYZINE PAMOATE 50 MG: 50 CAPSULE ORAL at 07:03

## 2025-03-26 RX ADMIN — ACETAMINOPHEN 1000 MG: 500 TABLET ORAL at 08:03

## 2025-03-26 RX ADMIN — OXYCODONE HYDROCHLORIDE 5 MG: 5 TABLET ORAL at 05:03

## 2025-03-26 RX ADMIN — Medication 6 MG: at 09:03

## 2025-03-26 RX ADMIN — NICOTINE 1 PATCH: 14 PATCH TRANSDERMAL at 08:03

## 2025-03-26 RX ADMIN — METOPROLOL SUCCINATE 25 MG: 25 TABLET, EXTENDED RELEASE ORAL at 08:03

## 2025-03-26 NOTE — CONSULTS
[facility] - 5th Floor Med Surg  Wound Care    Patient Name:  Kimani Redd   MRN:  [id number]  Date: 3/26/2025  Diagnosis: <principal problem not specified>    History:     Past Medical History:   Diagnosis Date    Kidney stone     passed 2 weeks ago       Social History[1]    Precautions:     Allergies as of 03/14/2025    (No Known Allergies)       WOC Assessment Details/Treatment   WOCN consulted for left shoulder packing post I &D from 3/25 per ortho. Patient awake, alert sitting in bedside chair, wife in attendance. Patient premedicated for pain per nurse. Patient states he feels medication is working. Left shoulder with 1/2 inch packing present and removed noted 2 sutures to lateral aspect of wound, noted undermining from 4-9 o'clock greatest 1cm @ 9. Noted wound bed red, no active bleeding at site. Patient tolerated dressing change well, states wasn't as bad as he thought.        03/26/25 0959   WOCN Assessment   WOCN Total Time (mins) 60   Visit Date 03/26/25   Visit Time 0959   Consult Type New   WOCN Speciality Wound   Wound surgical   Number of Wounds 1   Intervention assessed;changed;applied;chart review;orders   Teaching on-going   Skin Interventions   Device Skin Pressure Protection absorbent pad utilized/changed   Positioning   Body Position position changed independently        Wound 03/25/25 1044 Incision Left Shoulder   Date First Assessed/Time First Assessed: 03/25/25 1044   Primary Wound Type: Incision  Side: Left  Location: Shoulder  Closure Method: Sutures  Additional Comments: 1/2 iodoform packing, island dressing   Wound Image    Dressing Appearance Saturated   Drainage Amount Moderate   Drainage Characteristics/Odor Serosanguineous   Appearance Red   Red (%), Wound Tissue Color 100 %   Periwound Area Redness   Wound Edges Defined   Wound Length (cm) 1.6 cm   Wound Width (cm) 3 cm   Wound Depth (cm) 2.6 cm   Wound Volume (cm^3) 6.535 cm^3   Wound Surface Area (cm^2) 3.77 cm^2    Undermining (depth (cm)/location) 1cm @4- 9 o'clock,   Care Wound cleanser   Dressing Applied  (1/4 inche vashe moistened plain packing, gauze, abd, cloth tape)       Recommendations made to primary team for wound care with 1/4 inch Vashe moistened plain packing strips, cover with gauze, ABD pad, secure with cloth tape, change daily and as needed . Orders placed.     03/26/2025         [1]   Social History  Socioeconomic History    Marital status:    Occupational History     Comment: employed   Tobacco Use    Smoking status: Every Day     Current packs/day: 0.50     Types: Cigarettes    Smokeless tobacco: Never   Substance and Sexual Activity    Alcohol use: Never    Drug use: Never    Sexual activity: Not Currently   Social History Narrative    ** Merged History Encounter **

## 2025-03-26 NOTE — PROGRESS NOTES
"[facility] Hospital Medicine Progress Note        Chief Complaint: Inpatient Follow-up for left shoulder septic joint    HPI per admitting team: "Kimani Redd is a 59 y.o. male who  has a past medical history of Kidney stone.. The patient presented to [facility] on 3/15/2025 with a primary complaint of left shoulder pain.  Patient arrived from an outlying facility for concern of septic arthritis and abscess over the left shoulder.  Patient has not had no recent injury to his left shoulder or prior surgery.  Patient noted a bump that got worsened along with redness and warmth.  Status post I&D done in the ER.  Patient notes improvement in his shoulder pain and symptoms after I&D.  Labs were significant for leukocytosis along with the elevated inflammatory markers.  Patient was admitted to us for further workup   Left shoulder abscess was I and D on March 15th and that showed MSSA, patient had repeat bedside I and D with Orthopedics blood culture also grew MSSA Infectious diseases following and we are continuing with cefazolin SHRUTHI done on March 20th was negative for any kind of vegetation patient is still having a lot of purulent drainage  MRI showed AC joint septic arthritis with osteomyelitis of the distal clavicle and acromion"    3/15- left shoulder abscess culture -MSSA  3/17- repeat bedside I and D per orthopedic  2/2 blood cultures - MSSA  3/17- Repeat Blood cx x 2- negative at 24 hours    Patient underwent incision drainage left shoulder abscess, excision of left distal clavicle 03/25/2025 with Dr. Jefferson.      Interval Hx:   No acute events reported overnight.  He was seen at bedside this morning, family member in the room, he was sitting up in the chair, patient reports doing okay, pain from the surgery site after the wound care otherwise denied any complaints, discussed plan of care    Hemodynamically stable on room air    Intraoperative micro reporting moderate staph " aureus      Objective/physical exam:  General: In no acute distress, afebrile  Chest: Clear to auscultation bilaterally  Heart:  Normal rate +S1, S2  Abdomen: Soft, nontender  MSK:  Dressing left shoulder  Neurologic: Alert and oriented x4, left hand  strength good, sensations intact  VITAL SIGNS: 24 HRS MIN & MAX LAST   Temp  Min: 97.8 °F (36.6 °C)  Max: 98.1 °F (36.7 °C) 97.9 °F (36.6 °C)   BP  Min: 115/79  Max: 161/75 115/79   Pulse  Min: 74  Max: 91  74   Resp  Min: 17  Max: 19 18   SpO2  Min: 93 %  Max: 99 % 99 %     I have reviewed the following labs:  Recent Labs   Lab 03/21/25  0345 03/23/25  0901 03/25/25  0327   WBC 15.55* 13.46* 13.91*   RBC 5.11 5.06 5.33   HGB 15.2 14.9 15.6   HCT 45.5 45.1 46.4   MCV 89.0 89.1 87.1   MCH 29.7 29.4 29.3   MCHC 33.4 33.0 33.6   RDW 12.1 12.1 11.8   * 847* 740*   MPV 10.0 9.9 9.6     Recent Labs   Lab 03/20/25  0520 03/21/25  0744 03/23/25  0900 03/24/25  1042   * 137 139  --    K 5.2* 4.1 5.3* 4.3    102 104  --    CO2 23 27 26  --    BUN 17.9 17.7 20.1  --    CREATININE 0.67* 0.70* 0.71*  --    CALCIUM 9.1 9.1 9.4  --    MG 2.00 2.00  --   --    ALBUMIN  --  2.7*  --   --    ALKPHOS  --  121  --   --    ALT  --  16  --   --    AST  --  14  --   --    BILITOT  --  0.3  --   --      Microbiology Results (last 7 days)       Procedure Component Value Units Date/Time    Tissue Culture - Aerobic [[id number]]  (Abnormal) Collected: 03/25/25 1039    Order Status: Completed Specimen: Tissue from Clavicle, Left Updated: 03/26/25 0708     Tissue - Aerobic Culture Moderate Staphylococcus aureus    Gram Stain [[id number]] Collected: 03/25/25 1039    Order Status: Completed Specimen: Tissue from Clavicle, Left Updated: 03/25/25 1520     GRAM STAIN Moderate WBC observed      Moderate Gram positive cocci    Fungal Culture [[id number]] Collected: 03/25/25 1039    Order Status: Sent Specimen: Tissue from Clavicle, Left Updated: 03/25/25 1045    Anaerobic Culture  [[id number]] Collected: 03/25/25 1039    Order Status: Sent Specimen: Tissue from Clavicle, Left Updated: 03/25/25 1045    Blood Culture [[id number]]  (Normal) Collected: 03/17/25 1602    Order Status: Completed Specimen: Blood Updated: 03/22/25 2001     Blood Culture No Growth at 5 days    Blood Culture [[id number]]  (Normal) Collected: 03/17/25 1602    Order Status: Completed Specimen: Blood Updated: 03/22/25 2001     Blood Culture No Growth at 5 days             See below for Radiology    Intake/Output:No intake or output data in the 24 hours ending 03/26/25 1330             Assessment/Plan:  Left AC joint septic arthritis with osteomyelitis of the distal clavicle and acromion  MSSA bacteremia likely secondary to left shoulder abscess  Left shoulder abscess status post I and D - 03/15--> MSSA  Essential hypertension  History of MI/CAD, CMO 45% with RWMA, DD  Tobacco use  Mild hyperkalemia       Postop day 1  incision drainage left shoulder abscess, excision of left distal clavicle   Follow up postop orthopedic team recommendations, wound care  Infectious disease team following, Continue with IV antibiotics as outlined by ID team, patient currently on cefazolin 2 g Q 8  Follow intraoperative cultures  Emigdio as such was negative for any vegetation mildly reduced EF of 45%.  There is diastolic dysfunction.  Right ventricular systolic function   Supportive care  Case discussed with case management, transfer to [facility] initiated, for long-term IV antibiotics due to patient being self-pay( patient's sister is an RN working with Dr. Keyona CAMARENA at [facility])    VTE prophylaxis:  Lovenox    Patient condition:  Fair        Portions of this note dictated using EMR integrated voice recognition software, and may be subject to voice recognition errors not corrected at proofreading. Please contact writer for clarification if needed.   _____________________________________________________________________    Malnutrition Status:  Nutrition  consulted. Most recent weight and BMI monitored-     Measurements:  Wt Readings from Last 1 Encounters:   03/15/25 74.8 kg (165 lb)   Body mass index is 27.46 kg/m².    Patient has been screened and assessed by RD.    Malnutrition Type:  Context:    Level:      Malnutrition Characteristic Summary:       Interventions/Recommendations (treatment strategy):        Scheduled Med:   aspirin  81 mg Oral Daily    ceFAZolin (Ancef) IV (PEDS and ADULTS)  2 g Intravenous Q8H    enoxparin  40 mg Subcutaneous Q24H (prophylaxis, 1700)    fenofibrate  145 mg Oral Daily    metoprolol succinate  25 mg Oral Daily    mupirocin   Nasal BID    naloxegoL  25 mg Oral Daily    nicotine  1 patch Transdermal Daily      Continuous Infusions:     PRN Meds:  Current Facility-Administered Medications:     acetaminophen, 1,000 mg, Oral, Q6H PRN    aluminum-magnesium hydroxide-simethicone, 30 mL, Oral, QID PRN    bisacodyL, 10 mg, Rectal, Daily PRN    dextrose 50%, 12.5 g, Intravenous, PRN    dextrose 50%, 25 g, Intravenous, PRN    glucagon (human recombinant), 1 mg, Intramuscular, PRN    glucose, 16 g, Oral, PRN    glucose, 24 g, Oral, PRN    hydrOXYzine pamoate, 50 mg, Oral, Q6H PRN    melatonin, 6 mg, Oral, Nightly PRN    naloxone, 0.02 mg, Intravenous, PRN    ondansetron, 4 mg, Intravenous, Q4H PRN    oxyCODONE, 5 mg, Oral, Q4H PRN    prochlorperazine, 5 mg, Intravenous, Q6H PRN    senna-docusate 8.6-50 mg, 1 tablet, Oral, BID PRN    sodium chloride 0.9%, 10 mL, Intravenous, PRN    Flushing PICC/Midline Protocol, , , Until Discontinued **AND** sodium chloride 0.9%, 10 mL, Intravenous, Q12H PRN     Radiology:  I have personally reviewed the following imaging and agree with the radiologist.     SURG FL Surgery Fluoro Usage  See OP Notes for results.     IMPRESSION: See OP Notes for results.     This procedure was auto-finalized by: Virtual Radiologist      Bouchra Burks MD  Department of [facility] Medicine   [facility]  [facility]   03/26/2025

## 2025-03-26 NOTE — PROGRESS NOTES
Patient Name: Kimani Redd  MRN: [id number]  Admission Date: 3/15/2025  Hospital Length of Stay: 11 days  Attending Provider: Mirian Recio MD  Primary Care Provider: Jessi Leyva FNP    Subjective:     Principal Orthopedic Problem:  Left shoulder abscess  POD #1 I&D and excision left distal clavicle    Interval History:  Seen this morning. In chair at bedside. Discussed surgical findings; cultures +staph. Will need continued wound care for packing. Questions answered.     Review of patient's allergies indicates:  No Known Allergies    Current Facility-Administered Medications   Medication    acetaminophen tablet 1,000 mg    aluminum-magnesium hydroxide-simethicone 200-200-20 mg/5 mL suspension 30 mL    aspirin EC tablet 81 mg    bisacodyL suppository 10 mg    cefazolin (ANCEF) 2 gram in dextrose 5% 50 mL IVPB (premix)    dextrose 50% injection 12.5 g    dextrose 50% injection 25 g    enoxaparin injection 40 mg    fenofibrate tablet 145 mg    glucagon (human recombinant) injection 1 mg    glucose chewable tablet 16 g    glucose chewable tablet 24 g    hydrOXYzine pamoate capsule 50 mg    melatonin tablet 6 mg    metoprolol succinate (TOPROL-XL) 24 hr tablet 25 mg    mupirocin 2 % ointment    naloxegoL (MOVANTIK) tablet 25 mg    naloxone 0.4 mg/mL injection 0.02 mg    nicotine 14 mg/24 hr 1 patch    ondansetron injection 4 mg    oxyCODONE immediate release tablet 5 mg    prochlorperazine injection Soln 5 mg    senna-docusate 8.6-50 mg per tablet 1 tablet    sodium chloride 0.9% flush 10 mL    sodium chloride 0.9% flush 10 mL     Objective:     Vital Signs (Most Recent):  Temp: 97.9 °F (36.6 °C) (03/26/25 1100)  Pulse: 74 (03/26/25 1100)  Resp: 18 (03/26/25 1100)  BP: 115/79 (03/26/25 1100)  SpO2: 99 % (03/26/25 1100) Vital Signs (24h Range):  Temp:  [97.8 °F (36.6 °C)-98.3 °F (36.8 °C)] 97.9 °F (36.6 °C)  Pulse:  [69-91] 74  Resp:  [14-19] 18  SpO2:  [93 %-100 %] 99 %  BP: (115-161)/(71-95) 115/79  "    Weight: 74.8 kg (165 lb)  Height: 5' 5" (165.1 cm)  Body mass index is 27.46 kg/m².    No intake or output data in the 24 hours ending 03/26/25 1124      Physical Exam:   Ortho/SPM Exam    General the patient is alert and oriented x3 no acute distress nontoxic-appearing appropriate affect.    Constitutional: Vital signs are examined and stable.  Resp: No signs of labored breathing    Musculoskeletal Exam:  Left shoulder: Dressing CDI, NVI to LUE    Diagnostic Findings:   Significant Labs: BMP:   No results for input(s): "GLU", "NA", "K", "CL", "CO2", "BUN", "CREATININE", "CALCIUM", "MG" in the last 48 hours.    CBC:   Recent Labs   Lab 03/25/25  0327   WBC 13.91*   HGB 15.6   HCT 46.4   *     CMP:   No results for input(s): "NA", "K", "CL", "CO2", "GLU", "BUN", "CREATININE", "CALCIUM", "PROT", "ALBUMIN", "BILITOT", "ALKPHOS", "AST", "ALT", "ANIONGAP", "EGFRNONAA" in the last 48 hours.    Invalid input(s): "ESTGFAFRICA"    All pertinent labs within the past 24 hours have been reviewed.        Significant Imaging: I have reviewed and interpreted all pertinent imaging results/findings.     Assessment/Plan:   POD #1 I&D Left shoulder abscess and excision left distal clavicle  He is on IV antibiotics for MSSA; PICC in place  Continue antibiotics per ID  continue wound care and packing for shoulder abscess beginning today  OK for ADLs LUE, no overhead activity   Call with questions or concerns.    Jodi Dennis FNP-C  Orthopedic Trauma Surgery  [facility] - 5th Floor Med Surg    "

## 2025-03-26 NOTE — PLAN OF CARE
PFC order placed for transfer to [facility] for IV antibiotics due to patient being self-pay

## 2025-03-26 NOTE — PROGRESS NOTES
Tele-Infectious Diseases Follow-Up       Patient Name: Kimani Redd  Patient : 1965  Age/Sex: 59 y.o. male  Room/Bed: 536/536 A  Admission Date/Time: 3/15/2025 12:35 AM    Date: 3/26/2025    Assessment:     Kimani Redd is a 59 y.o. male with:  MSSA bacteremia:  Source unclear  Left shoulder abscess/distal clavicular osteomyelitis s/p I and D at bedside 3/15 and 3/17, no cultures obtained. MRI showed AC joint septic arthritis with osteomyelitis of the distal clavicle and acromion ,now status post or I&D. Gram stain showed GPC, cultures positive for staph aureus  Leukocytosis, 2/2 above, Improving    Plan:     Pending final OR culture 3/25, currently growing staph aureus  Continue IV cefazolin 2 g every 8 hours  Anticipating 6 weeks total of IV antibiotic therapy. Antibiotic days 3/25-5  Continue postoperative wound care  Need to follow CBC, BMP and CRP weekly  Follow-up with ID after discharge within 3-4 weeks    Thank you very much for allowing me to participate in the care of this patient.      ID will sign off. Please page with questions.    Diana Fraire MD   Attending, Infectious Disease     Reason for follow-up:      MSSA bacteremia    Summary:     Kimani Redd is a 59 y.o. male with a history significant for hypertension, tobacco use who was admitted on 3/15/2025 with left shoulder pain. Patient initially went to [facility] with left shoulder pain. There was a concern for possible septic arthritis. Patient reports no injury or trauma.  He has been having pain in his left shoulder for the last week or so. Patient received a dose of vancomycin/Zosyn at the outside hospital and then transferred here for further evaluation.  His laboratory workup was remarkable for white cell count of 25,000.  He underwent CT of his left shoulder which was unremarkable. He was seen by Orthopedic surgery team. He underwent I&D at bedside. He had 2 sets of blood culture obtained which are growing  methicillin sensitive Staphylococcus aureus. He underwent transthoracic echo which showed no clear vegetations.  He noted to have purulent drainage from his left shoulder on  and underwent I&D at bedside.  Repeated blood culture from 3/17 remains negative at  48 hours. Patient underwent left shoulder MRI on 3/20 which showed AC joint septic arthritis with osteomyelitis of the distal clavicle and acromion .  Patient was taken to the OR and underwent I&D on 3/25.  Gram stain showed GPC, cultures growing staph aureus.    Antimicrobial history:      Zosyn 3/14-3/15  Vancomycin 3/14-3/17   IV cefazolin 3/17-    24 hours events:      No acute events overnight   Remains hemodynamically stable    Subjective     Patient seen and examined, chart reviewed.  Patient said that he continues to have shoulder pain.     Review of Symptoms:     Patient reports no nausea, vomiting, diarrhea, cough, dyspnea, chest pain or palpitations    Objective     Vital signs  Vitals:    25 0104 25 0105 25 0416 25 0512   BP: 120/87  (!) 144/91    BP Location:       Patient Position:       Pulse: 91  84    Resp:  18  17   Temp: 97.8 °F (36.6 °C)  97.9 °F (36.6 °C)    TempSrc: Oral  Oral    SpO2: 95%  97%    Weight:       Height:          Temp (24hrs), Av °F (36.7 °C), Min:97.8 °F (36.6 °C), Max:98.3 °F (36.8 °C)    Physical exam:  GEN: Awake, resting comfortably  EYES: EOMI, no scleral icterus  HENT: MMM. No oral lesions. Fair dentition.  NECK: Supple, no cervical lymphadenopathy or meningismus.   CARDIO: RRR, no murmur.  PULM/CHEST: CTAB. No increased work of breathing  ABD: Normal bowel sounds. Soft, not tender or distended. No HSM appreciated.  MSK: no obvious effusion, swelling, increased warmth, or erythema of major joints. No pedal edema.  SKIN: No rashes. No stigmata of endocarditis.  NEURO: AOx3. Speech fluent. Face symmetric.  PSYCH: Mood appropriate         Diagnostic Test     CBC, INR  Recent Labs   Lab  03/20/25  0520 03/21/25  0345 03/23/25  0901 03/25/25  0327   WBC 17.31* 15.55* 13.46* 13.91*   HGB 14.8 15.2 14.9 15.6   * 726* 847* 740*       BMP  Recent Labs   Lab 03/20/25  0520 03/21/25  0744 03/23/25  0900 03/24/25  1042   * 137 139  --    K 5.2* 4.1 5.3* 4.3    102 104  --    CO2 23 27 26  --    BUN 17.9 17.7 20.1  --    CREATININE 0.67* 0.70* 0.71*  --    CALCIUM 9.1 9.1 9.4  --    MG 2.00 2.00  --   --      Recent Labs   Lab 03/21/25  0345 03/23/25  0901 03/25/25  0327   WBC 15.55* 13.46* 13.91*   HGB 15.2 14.9 15.6   HCT 45.5 45.1 46.4   * 847* 740*     Estimated Creatinine Clearance: 108.5 mL/min (A) (based on SCr of 0.71 mg/dL (L)).    Lab Results   Component Value Date    CREATININE 0.71 (L) 03/23/2025    CREATININE 0.70 (L) 03/21/2025    CREATININE 0.67 (L) 03/20/2025    ALKPHOS 121 03/21/2025    ALKPHOS 123 03/15/2025    ALKPHOS 123 03/14/2025        Microbiology and ID tests:     Microbiology Results (last 7 days)       Procedure Component Value Units Date/Time    Gram Stain [[id number]] Collected: 03/25/25 1039    Order Status: Completed Specimen: Tissue from Clavicle, Left Updated: 03/25/25 1520     GRAM STAIN Moderate WBC observed      Moderate Gram positive cocci    Fungal Culture [[id number]] Collected: 03/25/25 1039    Order Status: Sent Specimen: Tissue from Clavicle, Left Updated: 03/25/25 1045    Anaerobic Culture [[id number]] Collected: 03/25/25 1039    Order Status: Sent Specimen: Tissue from Clavicle, Left Updated: 03/25/25 1045    Tissue Culture - Aerobic [[id number]] Collected: 03/25/25 1039    Order Status: Resulted Specimen: Tissue from Clavicle, Left Updated: 03/25/25 1045    Blood Culture [[id number]]  (Normal) Collected: 03/17/25 1602    Order Status: Completed Specimen: Blood Updated: 03/22/25 2001     Blood Culture No Growth at 5 days    Blood Culture [[id number]]  (Normal) Collected: 03/17/25 1602    Order Status: Completed Specimen: Blood Updated:  03/22/25 2001     Blood Culture No Growth at 5 days              Medications   Inpatient  Scheduled Meds:   aspirin  81 mg Oral Daily    ceFAZolin (Ancef) IV (PEDS and ADULTS)  2 g Intravenous Q8H    enoxparin  40 mg Subcutaneous Q24H (prophylaxis, 1700)    fenofibrate  145 mg Oral Daily    metoprolol succinate  25 mg Oral Daily    mupirocin   Nasal BID    naloxegoL  25 mg Oral Daily    nicotine  1 patch Transdermal Daily     Continuous Infusions:  PRN Meds:.  Current Facility-Administered Medications:     acetaminophen, 1,000 mg, Oral, Q6H PRN    aluminum-magnesium hydroxide-simethicone, 30 mL, Oral, QID PRN    bisacodyL, 10 mg, Rectal, Daily PRN    dextrose 50%, 12.5 g, Intravenous, PRN    dextrose 50%, 25 g, Intravenous, PRN    glucagon (human recombinant), 1 mg, Intramuscular, PRN    glucose, 16 g, Oral, PRN    glucose, 24 g, Oral, PRN    hydrOXYzine pamoate, 50 mg, Oral, Q6H PRN    melatonin, 6 mg, Oral, Nightly PRN    naloxone, 0.02 mg, Intravenous, PRN    ondansetron, 4 mg, Intravenous, Q4H PRN    oxyCODONE, 5 mg, Oral, Q4H PRN    prochlorperazine, 5 mg, Intravenous, Q6H PRN    senna-docusate 8.6-50 mg, 1 tablet, Oral, BID PRN    sodium chloride 0.9%, 10 mL, Intravenous, PRN    Flushing PICC/Midline Protocol, , , Until Discontinued **AND** sodium chloride 0.9%, 10 mL, Intravenous, Q12H PRN    Home Meds  Current Outpatient Medications   Medication Instructions    aspirin (ECOTRIN) 81 mg, Daily    diclofenac (VOLTAREN) 50 mg, Oral, 3 times daily PRN    fenofibrate 160 mg, Oral, Daily    gabapentin (NEURONTIN) 300 mg, Oral, 3 times daily    LIDOcaine (LIDODERM) 5 % 1 patch, Transdermal, Daily, Remove & Discard patch within 12 hours or as directed by MD    metoprolol tartrate (LOPRESSOR) 12.5 mg, Oral, Daily    predniSONE (DELTASONE) 20 mg, Oral, Daily    simvastatin (ZOCOR) 40 mg, Oral, Nightly       Imaging (personally reviewed):     SURG FL Surgery Fluoro Usage   Final Result      MRI Shoulder W WO Contrast  Left   Final Result      AC joint septic arthritis with osteomyelitis of the distal clavicle and acromion.         Electronically signed by: Andrea Orellana   Date:    03/21/2025   Time:    16:44      X-Ray Chest 1 View for Line/Tube Placement   Final Result      Right PICC tip overlies the distal SVC.         Electronically signed by: Pola Kerr   Date:    03/20/2025   Time:    13:17            3/26/2025 8:30 AM    The attending portion of this evaluation, treatment, and documentation was performed per Diana Fraire MD via Telemedicine AudioVisual using the secure [employer] software platform with 2 way audio/video. The provider was located off-site and the patient is located in the hospital. The aforementioned video software was utilized to document the relevant history and physical exam.     Critical care time spent: >35 minutes

## 2025-03-27 LAB
ANION GAP SERPL CALC-SCNC: 8 MEQ/L
BACTERIA TISS AEROBE CULT: ABNORMAL
BASOPHILS # BLD AUTO: 0.07 X10(3)/MCL
BASOPHILS NFR BLD AUTO: 0.6 %
BUN SERPL-MCNC: 20.5 MG/DL (ref 8.4–25.7)
CALCIUM SERPL-MCNC: 9.1 MG/DL (ref 8.4–10.2)
CHLORIDE SERPL-SCNC: 104 MMOL/L (ref 98–107)
CO2 SERPL-SCNC: 27 MMOL/L (ref 22–29)
CREAT SERPL-MCNC: 0.68 MG/DL (ref 0.72–1.25)
CREAT/UREA NIT SERPL: 30
EOSINOPHIL # BLD AUTO: 0.3 X10(3)/MCL (ref 0–0.9)
EOSINOPHIL NFR BLD AUTO: 2.6 %
ERYTHROCYTE [DISTWIDTH] IN BLOOD BY AUTOMATED COUNT: 11.9 % (ref 11.5–17)
GFR SERPLBLD CREATININE-BSD FMLA CKD-EPI: >60 ML/MIN/1.73/M2
GLUCOSE SERPL-MCNC: 108 MG/DL (ref 74–100)
HCT VFR BLD AUTO: 43.2 % (ref 42–52)
HGB BLD-MCNC: 14.2 G/DL (ref 14–18)
IMM GRANULOCYTES # BLD AUTO: 0.04 X10(3)/MCL (ref 0–0.04)
IMM GRANULOCYTES NFR BLD AUTO: 0.3 %
LYMPHOCYTES # BLD AUTO: 3.95 X10(3)/MCL (ref 0.6–4.6)
LYMPHOCYTES NFR BLD AUTO: 33.8 %
MCH RBC QN AUTO: 29.6 PG (ref 27–31)
MCHC RBC AUTO-ENTMCNC: 32.9 G/DL (ref 33–36)
MCV RBC AUTO: 90 FL (ref 80–94)
MONOCYTES # BLD AUTO: 1.13 X10(3)/MCL (ref 0.1–1.3)
MONOCYTES NFR BLD AUTO: 9.7 %
NEUTROPHILS # BLD AUTO: 6.19 X10(3)/MCL (ref 2.1–9.2)
NEUTROPHILS NFR BLD AUTO: 53 %
NRBC BLD AUTO-RTO: 0 %
PLATELET # BLD AUTO: 680 X10(3)/MCL (ref 130–400)
PMV BLD AUTO: 9.3 FL (ref 7.4–10.4)
POTASSIUM SERPL-SCNC: 4.5 MMOL/L (ref 3.5–5.1)
RBC # BLD AUTO: 4.8 X10(6)/MCL (ref 4.7–6.1)
SODIUM SERPL-SCNC: 139 MMOL/L (ref 136–145)
WBC # BLD AUTO: 11.68 X10(3)/MCL (ref 4.5–11.5)

## 2025-03-27 PROCEDURE — 36415 COLL VENOUS BLD VENIPUNCTURE: CPT | Performed by: INTERNAL MEDICINE

## 2025-03-27 PROCEDURE — 25000003 PHARM REV CODE 250: Performed by: STUDENT IN AN ORGANIZED HEALTH CARE EDUCATION/TRAINING PROGRAM

## 2025-03-27 PROCEDURE — 11000001 HC ACUTE MED/SURG PRIVATE ROOM

## 2025-03-27 PROCEDURE — 85025 COMPLETE CBC W/AUTO DIFF WBC: CPT | Performed by: INTERNAL MEDICINE

## 2025-03-27 PROCEDURE — 25000003 PHARM REV CODE 250: Performed by: INTERNAL MEDICINE

## 2025-03-27 PROCEDURE — S4991 NICOTINE PATCH NONLEGEND: HCPCS | Performed by: STUDENT IN AN ORGANIZED HEALTH CARE EDUCATION/TRAINING PROGRAM

## 2025-03-27 PROCEDURE — 25000003 PHARM REV CODE 250: Performed by: NURSE PRACTITIONER

## 2025-03-27 PROCEDURE — 63600175 PHARM REV CODE 636 W HCPCS: Performed by: STUDENT IN AN ORGANIZED HEALTH CARE EDUCATION/TRAINING PROGRAM

## 2025-03-27 PROCEDURE — 80048 BASIC METABOLIC PNL TOTAL CA: CPT | Performed by: INTERNAL MEDICINE

## 2025-03-27 PROCEDURE — 63600175 PHARM REV CODE 636 W HCPCS: Performed by: INTERNAL MEDICINE

## 2025-03-27 PROCEDURE — 21400001 HC TELEMETRY ROOM

## 2025-03-27 RX ADMIN — FENOFIBRATE 145 MG: 145 TABLET, FILM COATED ORAL at 09:03

## 2025-03-27 RX ADMIN — CEFAZOLIN SODIUM 2 G: 2 SOLUTION INTRAVENOUS at 08:03

## 2025-03-27 RX ADMIN — ASPIRIN 81 MG: 81 TABLET, COATED ORAL at 09:03

## 2025-03-27 RX ADMIN — HYDROXYZINE PAMOATE 50 MG: 50 CAPSULE ORAL at 02:03

## 2025-03-27 RX ADMIN — MUPIROCIN: 20 OINTMENT TOPICAL at 09:03

## 2025-03-27 RX ADMIN — OXYCODONE HYDROCHLORIDE 5 MG: 5 TABLET ORAL at 05:03

## 2025-03-27 RX ADMIN — MUPIROCIN: 20 OINTMENT TOPICAL at 08:03

## 2025-03-27 RX ADMIN — OXYCODONE HYDROCHLORIDE 5 MG: 5 TABLET ORAL at 09:03

## 2025-03-27 RX ADMIN — ACETAMINOPHEN 1000 MG: 500 TABLET ORAL at 02:03

## 2025-03-27 RX ADMIN — NALOXEGOL OXALATE 25 MG: 25 TABLET, FILM COATED ORAL at 09:03

## 2025-03-27 RX ADMIN — OXYCODONE HYDROCHLORIDE 5 MG: 5 TABLET ORAL at 01:03

## 2025-03-27 RX ADMIN — HYDROXYZINE PAMOATE 50 MG: 50 CAPSULE ORAL at 09:03

## 2025-03-27 RX ADMIN — CEFAZOLIN SODIUM 2 G: 2 SOLUTION INTRAVENOUS at 12:03

## 2025-03-27 RX ADMIN — NICOTINE 1 PATCH: 14 PATCH TRANSDERMAL at 09:03

## 2025-03-27 RX ADMIN — METOPROLOL SUCCINATE 25 MG: 25 TABLET, EXTENDED RELEASE ORAL at 09:03

## 2025-03-27 RX ADMIN — HYDROXYZINE PAMOATE 50 MG: 50 CAPSULE ORAL at 08:03

## 2025-03-27 RX ADMIN — ENOXAPARIN SODIUM 40 MG: 40 INJECTION SUBCUTANEOUS at 05:03

## 2025-03-27 RX ADMIN — Medication 6 MG: at 09:03

## 2025-03-27 RX ADMIN — CEFAZOLIN SODIUM 2 G: 2 SOLUTION INTRAVENOUS at 04:03

## 2025-03-27 NOTE — PROGRESS NOTES
Inpatient Nutrition Evaluation    Admit Date: 3/15/2025   Total duration of encounter: 12 days   Patient Age: 59 y.o.    Nutrition Recommendation/Prescription     Continue regular diet.  Terry (provides 90 kcal, 2.5 g protein per serving) BID to support wound healing.   Obtain standing weight.     Nutrition Assessment     Chart Review    Reason Seen: length of stay and follow-up    Malnutrition Screening Tool Results   Have you recently lost weight without trying?: No  Have you been eating poorly because of a decreased appetite?: No   MST Score: 0   Diagnosis:  MSSA bacteremia likely secondary to left shoulder abscess  Left shoulder abscess status post I and D - 03/15--> MSSA  Essential hypertension    Relevant Medical History: MI/CAD, CMO 45% with RWMA, DD     Scheduled Medications:  aspirin, 81 mg, Daily  ceFAZolin (Ancef) IV (PEDS and ADULTS), 2 g, Q8H  enoxparin, 40 mg, Q24H (prophylaxis, 1700)  fenofibrate, 145 mg, Daily  metoprolol succinate, 25 mg, Daily  mupirocin, , BID  naloxegoL, 25 mg, Daily  nicotine, 1 patch, Daily    Continuous Infusions:   PRN Medications:   Current Facility-Administered Medications:     acetaminophen, 1,000 mg, Oral, Q6H PRN    aluminum-magnesium hydroxide-simethicone, 30 mL, Oral, QID PRN    bisacodyL, 10 mg, Rectal, Daily PRN    dextrose 50%, 12.5 g, Intravenous, PRN    dextrose 50%, 25 g, Intravenous, PRN    glucagon (human recombinant), 1 mg, Intramuscular, PRN    glucose, 16 g, Oral, PRN    glucose, 24 g, Oral, PRN    hydrOXYzine pamoate, 50 mg, Oral, Q6H PRN    melatonin, 6 mg, Oral, Nightly PRN    naloxone, 0.02 mg, Intravenous, PRN    ondansetron, 4 mg, Intravenous, Q4H PRN    oxyCODONE, 5 mg, Oral, Q4H PRN    prochlorperazine, 5 mg, Intravenous, Q6H PRN    senna-docusate 8.6-50 mg, 1 tablet, Oral, BID PRN    sodium chloride 0.9%, 10 mL, Intravenous, PRN    Flushing PICC/Midline Protocol, , , Until Discontinued **AND** sodium chloride 0.9%, 10 mL, Intravenous, Q12H  "PRN    Recent Labs   Lab 03/21/25  0345 03/21/25  0744 03/23/25  0900 03/23/25  0901 03/24/25  1042 03/25/25  0327 03/27/25  0310   NA  --  137 139  --   --   --  139   K  --  4.1 5.3*  --  4.3  --  4.5   CALCIUM  --  9.1 9.4  --   --   --  9.1   MG  --  2.00  --   --   --   --   --    CL  --  102 104  --   --   --  104   CO2  --  27 26  --   --   --  27   BUN  --  17.7 20.1  --   --   --  20.5   CREATININE  --  0.70* 0.71*  --   --   --  0.68*   EGFRNORACEVR  --  >60 >60  --   --   --  >60   GLUCOSE  --  113* 89  --   --   --  108*   BILITOT  --  0.3  --   --   --   --   --    ALKPHOS  --  121  --   --   --   --   --    ALT  --  16  --   --   --   --   --    AST  --  14  --   --   --   --   --    ALBUMIN  --  2.7*  --   --   --   --   --    WBC 15.55*  --   --  13.46*  --  13.91* 11.68*   HGB 15.2  --   --  14.9  --  15.6 14.2   HCT 45.5  --   --  45.1  --  46.4 43.2     Nutrition Orders:  Diet Adult Regular Standard Tray  Dietary nutrition supplements BID; Terry - Any flavor    Appetite/Oral Intake: good/50-75% of meals  Factors Affecting Nutritional Intake:  preferences; being in the hospital  Food/[employer]/Cultural Preferences: none reported  Food Allergies: none reported  Last Bowel Movement: 03/26/25  Wound(s):[REMOVED]      Wound 03/15/25 0110 Abscess Left Shoulder-Tissue loss description: Full thickness     Comments    3/21/25 Reports decreased appetite and ~5lbs weight loss PTA s/t pain. Appetite has improved since admit and tolerating, just with decreased intake s/t preferences. Likes cajun/seasoned meals. Politely declined Boost oral supplement at this time. Will send Terry to trial for full thickness abscess on shoulder. UBW ranges from 160-165lbs.     3/27/25 Patient reports good oral intake of meals served. Drinking Terry supplement BID.     Anthropometrics    Height: 5' 5" (165.1 cm), Height Method: Stated  Last Weight: 74.8 kg (165 lb) (03/15/25 1202), Weight Method: Stated  BMI (Calculated): " 27.5  BMI Classification: overweight (BMI 25-29.9)        Ideal Body Weight (IBW), Male: 136 lb     % Ideal Body Weight, Male (lb): 121.32 %                 Usual Body Weight (UBW), k.7 kg  % Usual Body Weight: 103.16     Usual Weight Provided By: patient    Wt Readings from Last 5 Encounters:   03/15/25 74.8 kg (165 lb)   25 74.8 kg (165 lb)   25 72.9 kg (160 lb 11.2 oz)   25 74.8 kg (165 lb)   25 74.8 kg (165 lb)     Weight Change(s) Since Admission:   Wt Readings from Last 1 Encounters:   03/15/25 1202 74.8 kg (165 lb)   03/15/25 0033 74.8 kg (165 lb)   Admit Weight: 74.8 kg (165 lb) (03/15/25 0033), Weight Method: Stated    Patient Education     Not applicable.    Nutrition Goals & Monitoring     Dietitian will monitor: energy intake    Nutrition Risk/Follow-Up: low (follow-up in 5-7 days)  Patients assigned 'low nutrition risk' status do not qualify for a full nutritional assessment but will be monitored and re-evaluated in a 5-7 day time period. Please consult if re-evaluation needed sooner.

## 2025-03-27 NOTE — PROGRESS NOTES
"[facility] Hospital Medicine Progress Note        Chief Complaint: Inpatient Follow-up for     HPI:    "Kimani Redd is a 59 y.o. male who  has a past medical history of Kidney stone.. The patient presented to [facility] on 3/15/2025 with a primary complaint of left shoulder pain.  Patient arrived from an outlying facility for concern of septic arthritis and abscess over the left shoulder.  Patient has not had no recent injury to his left shoulder or prior surgery.  Patient noted a bump that got worsened along with redness and warmth.  Status post I&D done in the ER.  Patient notes improvement in his shoulder pain and symptoms after I&D.  Labs were significant for leukocytosis along with the elevated inflammatory markers.  Patient was admitted to us for further workup   Left shoulder abscess was I and D on March 15th and that showed MSSA, patient had repeat bedside I and D with Orthopedics blood culture also grew MSSA Infectious diseases following and we are continuing with cefazolin SHRUTHI done on March 20th was negative for any kind of vegetation patient is still having a lot of purulent drainage  MRI showed AC joint septic arthritis with osteomyelitis of the distal clavicle and acromion"     3/15- left shoulder abscess culture -MSSA  3/17- repeat bedside I and D per orthopedic  2/2 blood cultures - MSSA  3/17- Repeat Blood cx x 2- negative at 24 hours     Patient underwent incision drainage left shoulder abscess, excision of left distal clavicle 03/25/2025 with Dr. Jefferson.      Interval Hx:   No acute events overnight.  Pain is well controlled.  Wife at bedside.  He has no new complaints or concerns.  Awaiting transfer to [facility].        Objective/physical exam:  Vitals:    03/27/25 0927 03/27/25 0928 03/27/25 1028 03/27/25 1044   BP:  118/76 118/76 123/78   Pulse:  75  76   Resp: 18      Temp:   97.9 °F (36.6 °C) 97.9 °F (36.6 °C)   TempSrc:    Oral   SpO2:    98%   Weight:       Height:     "     General: In no acute distress, afebrile  Respiratory: Clear to auscultation bilaterally  Cardiovascular: S1, S2, no appreciable murmur  Abdomen: Soft, nontender, BS +    Neurologic: Alert and oriented x4, moving all extremities      Lab Results   Component Value Date     03/27/2025    K 4.5 03/27/2025     03/27/2025    CO2 27 03/27/2025    BUN 20.5 03/27/2025    CREATININE 0.68 (L) 03/27/2025    CALCIUM 9.1 03/27/2025    EGFRNONAA >60 07/17/2021      Lab Results   Component Value Date    ALT 16 03/21/2025    AST 14 03/21/2025    ALKPHOS 121 03/21/2025    BILITOT 0.3 03/21/2025      Lab Results   Component Value Date    WBC 11.68 (H) 03/27/2025    HGB 14.2 03/27/2025    HCT 43.2 03/27/2025    MCV 90.0 03/27/2025     (H) 03/27/2025           Medications:   aspirin  81 mg Oral Daily    ceFAZolin (Ancef) IV (PEDS and ADULTS)  2 g Intravenous Q8H    enoxparin  40 mg Subcutaneous Q24H (prophylaxis, 1700)    fenofibrate  145 mg Oral Daily    metoprolol succinate  25 mg Oral Daily    mupirocin   Nasal BID    naloxegoL  25 mg Oral Daily    nicotine  1 patch Transdermal Daily        Current Facility-Administered Medications:     acetaminophen, 1,000 mg, Oral, Q6H PRN    aluminum-magnesium hydroxide-simethicone, 30 mL, Oral, QID PRN    bisacodyL, 10 mg, Rectal, Daily PRN    dextrose 50%, 12.5 g, Intravenous, PRN    dextrose 50%, 25 g, Intravenous, PRN    glucagon (human recombinant), 1 mg, Intramuscular, PRN    glucose, 16 g, Oral, PRN    glucose, 24 g, Oral, PRN    hydrOXYzine pamoate, 50 mg, Oral, Q6H PRN    melatonin, 6 mg, Oral, Nightly PRN    naloxone, 0.02 mg, Intravenous, PRN    ondansetron, 4 mg, Intravenous, Q4H PRN    oxyCODONE, 5 mg, Oral, Q4H PRN    prochlorperazine, 5 mg, Intravenous, Q6H PRN    senna-docusate 8.6-50 mg, 1 tablet, Oral, BID PRN    sodium chloride 0.9%, 10 mL, Intravenous, PRN    Flushing PICC/Midline Protocol, , , Until Discontinued **AND** sodium chloride 0.9%, 10 mL,  Intravenous, Q12H PRN     Assessment/Plan:    Left AC joint septic arthritis with osteomyelitis of the distal clavicle and acromion  MSSA bacteremia likely secondary to left shoulder abscess  Left shoulder abscess status post I and D - 03/15--> MSSA  Essential hypertension  History of MI/CAD, CMO 45% with RWMA, DD  Tobacco use      -continue surgical site care.  Awaiting transfer to [facility].    -ID recommendations noted.  Continue renew cefazolin.  Follow cultures until finalized   -fatou showed no signs of vegetations.  Needs outpatient cardiology follow up for EF 45%    Lovenox            Francisco Chen MD

## 2025-03-28 LAB — BACTERIA SPEC ANAEROBE CULT: NORMAL

## 2025-03-28 PROCEDURE — 11000001 HC ACUTE MED/SURG PRIVATE ROOM

## 2025-03-28 PROCEDURE — 25000003 PHARM REV CODE 250: Performed by: NURSE PRACTITIONER

## 2025-03-28 PROCEDURE — 63600175 PHARM REV CODE 636 W HCPCS: Performed by: INTERNAL MEDICINE

## 2025-03-28 PROCEDURE — 63600175 PHARM REV CODE 636 W HCPCS: Performed by: STUDENT IN AN ORGANIZED HEALTH CARE EDUCATION/TRAINING PROGRAM

## 2025-03-28 PROCEDURE — 25000003 PHARM REV CODE 250: Performed by: INTERNAL MEDICINE

## 2025-03-28 PROCEDURE — 25000003 PHARM REV CODE 250: Performed by: STUDENT IN AN ORGANIZED HEALTH CARE EDUCATION/TRAINING PROGRAM

## 2025-03-28 PROCEDURE — S4991 NICOTINE PATCH NONLEGEND: HCPCS | Performed by: STUDENT IN AN ORGANIZED HEALTH CARE EDUCATION/TRAINING PROGRAM

## 2025-03-28 PROCEDURE — 21400001 HC TELEMETRY ROOM

## 2025-03-28 RX ADMIN — OXYCODONE HYDROCHLORIDE 5 MG: 5 TABLET ORAL at 02:03

## 2025-03-28 RX ADMIN — METOPROLOL SUCCINATE 25 MG: 25 TABLET, EXTENDED RELEASE ORAL at 08:03

## 2025-03-28 RX ADMIN — CEFAZOLIN SODIUM 2 G: 2 SOLUTION INTRAVENOUS at 08:03

## 2025-03-28 RX ADMIN — OXYCODONE HYDROCHLORIDE 5 MG: 5 TABLET ORAL at 06:03

## 2025-03-28 RX ADMIN — MUPIROCIN: 20 OINTMENT TOPICAL at 08:03

## 2025-03-28 RX ADMIN — MUPIROCIN: 20 OINTMENT TOPICAL at 09:03

## 2025-03-28 RX ADMIN — HYDROXYZINE PAMOATE 50 MG: 50 CAPSULE ORAL at 02:03

## 2025-03-28 RX ADMIN — Medication 6 MG: at 08:03

## 2025-03-28 RX ADMIN — CEFAZOLIN SODIUM 2 G: 2 SOLUTION INTRAVENOUS at 06:03

## 2025-03-28 RX ADMIN — ENOXAPARIN SODIUM 40 MG: 40 INJECTION SUBCUTANEOUS at 05:03

## 2025-03-28 RX ADMIN — CEFAZOLIN SODIUM 2 G: 2 SOLUTION INTRAVENOUS at 11:03

## 2025-03-28 RX ADMIN — FENOFIBRATE 145 MG: 145 TABLET, FILM COATED ORAL at 08:03

## 2025-03-28 RX ADMIN — HYDROXYZINE PAMOATE 50 MG: 50 CAPSULE ORAL at 08:03

## 2025-03-28 RX ADMIN — ALUMINUM HYDROXIDE, MAGNESIUM HYDROXIDE, AND DIMETHICONE 30 ML: 200; 20; 200 SUSPENSION ORAL at 08:03

## 2025-03-28 RX ADMIN — OXYCODONE HYDROCHLORIDE 5 MG: 5 TABLET ORAL at 10:03

## 2025-03-28 RX ADMIN — NALOXEGOL OXALATE 25 MG: 25 TABLET, FILM COATED ORAL at 08:03

## 2025-03-28 RX ADMIN — NICOTINE 1 PATCH: 14 PATCH TRANSDERMAL at 08:03

## 2025-03-28 RX ADMIN — ASPIRIN 81 MG: 81 TABLET, COATED ORAL at 08:03

## 2025-03-28 NOTE — PROGRESS NOTES
Patient Name: Kimani Redd  MRN: [id number]  Admission Date: 3/15/2025  Hospital Length of Stay: 13 days  Attending Provider: Mirian Recio MD  Primary Care Provider: Jessi Leyva FNP  Follow-up For: Procedure(s) (LRB):  INCISION AND DRAINAGE, UPPER EXTREMITY (Left)    Post-Operative Day: 3 Days Post-Op  Subjective:       Principal Orthopedic Problem: 3 Days Post-Op       Interval History:  Status post incision drainage and excision of distal clavicle left shoulder.  He states that he has been feeling better over the last couple of days.  Wound care has been change in the packing daily.  It is still sore as expected.  Continuing IV antibiotics per Infectious Disease recommendations.    Review of patient's allergies indicates:  No Known Allergies    Current Facility-Administered Medications   Medication    acetaminophen tablet 1,000 mg    aluminum-magnesium hydroxide-simethicone 200-200-20 mg/5 mL suspension 30 mL    aspirin EC tablet 81 mg    bisacodyL suppository 10 mg    cefazolin (ANCEF) 2 gram in dextrose 5% 50 mL IVPB (premix)    dextrose 50% injection 12.5 g    dextrose 50% injection 25 g    enoxaparin injection 40 mg    fenofibrate tablet 145 mg    glucagon (human recombinant) injection 1 mg    glucose chewable tablet 16 g    glucose chewable tablet 24 g    hydrOXYzine pamoate capsule 50 mg    melatonin tablet 6 mg    metoprolol succinate (TOPROL-XL) 24 hr tablet 25 mg    mupirocin 2 % ointment    naloxegoL (MOVANTIK) tablet 25 mg    naloxone 0.4 mg/mL injection 0.02 mg    nicotine 14 mg/24 hr 1 patch    ondansetron injection 4 mg    oxyCODONE immediate release tablet 5 mg    prochlorperazine injection Soln 5 mg    senna-docusate 8.6-50 mg per tablet 1 tablet    sodium chloride 0.9% flush 10 mL    sodium chloride 0.9% flush 10 mL     Objective:     Vital Signs (Most Recent):  Temp: 97.8 °F (36.6 °C) (03/28/25 0215)  Pulse: 79 (03/28/25 0215)  Resp: 17 (03/28/25 0615)  BP: 122/77 (03/28/25  "0215)  SpO2: 98 % (03/28/25 0215) Vital Signs (24h Range):  Temp:  [97.8 °F (36.6 °C)-98 °F (36.7 °C)] 97.8 °F (36.6 °C)  Pulse:  [75-83] 79  Resp:  [17-18] 17  SpO2:  [97 %-99 %] 98 %  BP: (117-123)/(73-78) 122/77     Weight: 74.8 kg (165 lb)  Height: 5' 5" (165.1 cm)  Body mass index is 27.46 kg/m².      Intake/Output Summary (Last 24 hours) at 3/28/2025 0712  Last data filed at 3/27/2025 1848  Gross per 24 hour   Intake 650 ml   Output --   Net 650 ml       Physical Exam:   Ortho/SPM Exam    General the patient is alert and oriented x3 no acute distress nontoxic-appearing appropriate affect.    Constitutional: Vital signs are examined and stable.  Resp: No signs of labored breathing    Musculoskeletal Exam:  Left upper extremity:  Dressing with spots of serosanguineous drainage, wound evaluated.  Packing in place, left in place this morning.  No surrounding spreading erythema, no areas of fluctuance noted.  Sutures intact to the lateral aspect of the incision, medial aspect is packed.  He is able to perform range motion of the shoulder.  Neurovascularly intact distally.    Diagnostic Findings:   Significant Labs: BMP:   Recent Labs   Lab 03/27/25  0310      K 4.5      CO2 27   BUN 20.5   CREATININE 0.68*   CALCIUM 9.1     CBC:   Recent Labs   Lab 03/27/25 0310   WBC 11.68*   HGB 14.2   HCT 43.2   *     CMP:   Recent Labs   Lab 03/27/25 0310      K 4.5      CO2 27   BUN 20.5   CREATININE 0.68*   CALCIUM 9.1     All pertinent labs within the past 24 hours have been reviewed.        Significant Imaging: I have reviewed and interpreted all pertinent imaging results/findings.     Assessment/Plan:     Afebrile, white blood cell count improving.  Continue IV antibiotics per Infectious Disease recommendations.  Continue local wound care daily.  No plans for any further operative intervention at this point.  Sutures we will need to come out in 2-3 weeks.  I will have him continue to avoid " overhead activities, he can perform waist level ADLs and range motion.  He is happy with this plan of care and all questions and concerns were addressed.  We will monitor weekly while in-house, call with any questions or concerns.    This note/OR report was created with the assistance of  voice recognition software or phone  dictation.  There may be transcription errors as a result of using this technology however minimal. Effort has been made to assure accuracy of transcription but any obvious errors or omissions should be clarified with the author of the document.           Ernesto Jefferson MD  Orthopedic Trauma Surgery  [facility] - 5th Floor Med Surg

## 2025-03-28 NOTE — PROGRESS NOTES
"[facility] Hospital Medicine Progress Note        Chief Complaint: Inpatient Follow-up for     HPI:    "Kimani Redd is a 59 y.o. male who  has a past medical history of Kidney stone.. The patient presented to [facility] on 3/15/2025 with a primary complaint of left shoulder pain.  Patient arrived from an outlying facility for concern of septic arthritis and abscess over the left shoulder.  Patient has not had no recent injury to his left shoulder or prior surgery.  Patient noted a bump that got worsened along with redness and warmth.  Status post I&D done in the ER.  Patient notes improvement in his shoulder pain and symptoms after I&D.  Labs were significant for leukocytosis along with the elevated inflammatory markers.  Patient was admitted to us for further workup   Left shoulder abscess was I and D on March 15th and that showed MSSA, patient had repeat bedside I and D with Orthopedics blood culture also grew MSSA Infectious diseases following and we are continuing with cefazolin SHRUTHI done on March 20th was negative for any kind of vegetation patient is still having a lot of purulent drainage  MRI showed AC joint septic arthritis with osteomyelitis of the distal clavicle and acromion"     3/15- left shoulder abscess culture -MSSA  3/17- repeat bedside I and D per orthopedic  2/2 blood cultures - MSSA  3/17- Repeat Blood cx x 2- negative at 24 hours     Patient underwent incision drainage left shoulder abscess, excision of left distal clavicle 03/25/2025 with Dr. Jefferson.      Interval Hx:   Pain is well controlled.  Hemodynamically stable.  No new complaints or concerns.  Tolerating PT      Objective/physical exam:  Vitals:    03/27/25 2134 03/28/25 0215 03/28/25 0219 03/28/25 0615   BP:  122/77     Pulse:  79     Resp: 17 17 17   Temp:  97.8 °F (36.6 °C)     TempSrc:  Oral     SpO2:  98%     Weight:       Height:         General: In no acute distress, afebrile  Respiratory: Clear to " auscultation bilaterally  Cardiovascular: S1, S2, no appreciable murmur  Abdomen: Soft, nontender, BS +    Neurologic: Alert and oriented x4, moving all extremities      Lab Results   Component Value Date     03/27/2025    K 4.5 03/27/2025     03/27/2025    CO2 27 03/27/2025    BUN 20.5 03/27/2025    CREATININE 0.68 (L) 03/27/2025    CALCIUM 9.1 03/27/2025    EGFRNONAA >60 07/17/2021      Lab Results   Component Value Date    ALT 16 03/21/2025    AST 14 03/21/2025    ALKPHOS 121 03/21/2025    BILITOT 0.3 03/21/2025      Lab Results   Component Value Date    WBC 11.68 (H) 03/27/2025    HGB 14.2 03/27/2025    HCT 43.2 03/27/2025    MCV 90.0 03/27/2025     (H) 03/27/2025           Medications:   aspirin  81 mg Oral Daily    ceFAZolin (Ancef) IV (PEDS and ADULTS)  2 g Intravenous Q8H    enoxparin  40 mg Subcutaneous Q24H (prophylaxis, 1700)    fenofibrate  145 mg Oral Daily    metoprolol succinate  25 mg Oral Daily    mupirocin   Nasal BID    naloxegoL  25 mg Oral Daily    nicotine  1 patch Transdermal Daily        Current Facility-Administered Medications:     acetaminophen, 1,000 mg, Oral, Q6H PRN    aluminum-magnesium hydroxide-simethicone, 30 mL, Oral, QID PRN    bisacodyL, 10 mg, Rectal, Daily PRN    dextrose 50%, 12.5 g, Intravenous, PRN    dextrose 50%, 25 g, Intravenous, PRN    glucagon (human recombinant), 1 mg, Intramuscular, PRN    glucose, 16 g, Oral, PRN    glucose, 24 g, Oral, PRN    hydrOXYzine pamoate, 50 mg, Oral, Q6H PRN    melatonin, 6 mg, Oral, Nightly PRN    naloxone, 0.02 mg, Intravenous, PRN    ondansetron, 4 mg, Intravenous, Q4H PRN    oxyCODONE, 5 mg, Oral, Q4H PRN    prochlorperazine, 5 mg, Intravenous, Q6H PRN    senna-docusate 8.6-50 mg, 1 tablet, Oral, BID PRN    sodium chloride 0.9%, 10 mL, Intravenous, PRN    Flushing PICC/Midline Protocol, , , Until Discontinued **AND** sodium chloride 0.9%, 10 mL, Intravenous, Q12H PRN     Assessment/Plan:    Left AC joint septic  arthritis with osteomyelitis of the distal clavicle and acromion  MSSA bacteremia likely secondary to left shoulder abscess  Left shoulder abscess status post I and D - 03/15--> MSSA  Essential hypertension  History of MI/CAD, CMO 45% with RWMA, DD  Tobacco use      Plan:  -aerobic culture showed MSSA.  Fungal cultures in process.  continue surgical site care.  Awaiting transfer to [facility].  ID recommendations noted.  Continue Cefazolin.   6 weeks of antibiotics until 05/02/2025.    -fatou showed no signs of vegetations.  Needs outpatient cardiology follow up for EF 45%    Lovenox            Francisco Chen MD

## 2025-03-29 PROCEDURE — 25000003 PHARM REV CODE 250: Performed by: INTERNAL MEDICINE

## 2025-03-29 PROCEDURE — 25000003 PHARM REV CODE 250: Performed by: NURSE PRACTITIONER

## 2025-03-29 PROCEDURE — S4991 NICOTINE PATCH NONLEGEND: HCPCS | Performed by: STUDENT IN AN ORGANIZED HEALTH CARE EDUCATION/TRAINING PROGRAM

## 2025-03-29 PROCEDURE — 25000003 PHARM REV CODE 250: Performed by: STUDENT IN AN ORGANIZED HEALTH CARE EDUCATION/TRAINING PROGRAM

## 2025-03-29 PROCEDURE — 11000001 HC ACUTE MED/SURG PRIVATE ROOM

## 2025-03-29 PROCEDURE — 63600175 PHARM REV CODE 636 W HCPCS: Performed by: INTERNAL MEDICINE

## 2025-03-29 PROCEDURE — 63600175 PHARM REV CODE 636 W HCPCS: Performed by: STUDENT IN AN ORGANIZED HEALTH CARE EDUCATION/TRAINING PROGRAM

## 2025-03-29 PROCEDURE — 21400001 HC TELEMETRY ROOM

## 2025-03-29 RX ADMIN — CEFAZOLIN SODIUM 2 G: 2 SOLUTION INTRAVENOUS at 05:03

## 2025-03-29 RX ADMIN — MUPIROCIN: 20 OINTMENT TOPICAL at 08:03

## 2025-03-29 RX ADMIN — CEFAZOLIN SODIUM 2 G: 2 SOLUTION INTRAVENOUS at 11:03

## 2025-03-29 RX ADMIN — HYDROXYZINE PAMOATE 50 MG: 50 CAPSULE ORAL at 08:03

## 2025-03-29 RX ADMIN — FENOFIBRATE 145 MG: 145 TABLET, FILM COATED ORAL at 08:03

## 2025-03-29 RX ADMIN — OXYCODONE HYDROCHLORIDE 5 MG: 5 TABLET ORAL at 10:03

## 2025-03-29 RX ADMIN — HYDROXYZINE PAMOATE 50 MG: 50 CAPSULE ORAL at 02:03

## 2025-03-29 RX ADMIN — OXYCODONE HYDROCHLORIDE 5 MG: 5 TABLET ORAL at 02:03

## 2025-03-29 RX ADMIN — NALOXEGOL OXALATE 25 MG: 25 TABLET, FILM COATED ORAL at 08:03

## 2025-03-29 RX ADMIN — ASPIRIN 81 MG: 81 TABLET, COATED ORAL at 08:03

## 2025-03-29 RX ADMIN — NICOTINE 1 PATCH: 14 PATCH TRANSDERMAL at 08:03

## 2025-03-29 RX ADMIN — CEFAZOLIN SODIUM 2 G: 2 SOLUTION INTRAVENOUS at 08:03

## 2025-03-29 RX ADMIN — OXYCODONE HYDROCHLORIDE 5 MG: 5 TABLET ORAL at 06:03

## 2025-03-29 RX ADMIN — OXYCODONE HYDROCHLORIDE 5 MG: 5 TABLET ORAL at 05:03

## 2025-03-29 RX ADMIN — METOPROLOL SUCCINATE 25 MG: 25 TABLET, EXTENDED RELEASE ORAL at 08:03

## 2025-03-29 RX ADMIN — Medication 6 MG: at 08:03

## 2025-03-29 RX ADMIN — ENOXAPARIN SODIUM 40 MG: 40 INJECTION SUBCUTANEOUS at 05:03

## 2025-03-29 NOTE — PROGRESS NOTES
"[facility] Hospital Medicine Progress Note        Chief Complaint: Inpatient Follow-up for     HPI:    "Kimani Redd is a 59 y.o. male who  has a past medical history of Kidney stone.. The patient presented to [facility] on 3/15/2025 with a primary complaint of left shoulder pain.  Patient arrived from an outlying facility for concern of septic arthritis and abscess over the left shoulder.  Patient has not had no recent injury to his left shoulder or prior surgery.  Patient noted a bump that got worsened along with redness and warmth.  Status post I&D done in the ER.  Patient notes improvement in his shoulder pain and symptoms after I&D.  Labs were significant for leukocytosis along with the elevated inflammatory markers.  Patient was admitted to us for further workup   Left shoulder abscess was I and D on March 15th and that showed MSSA, patient had repeat bedside I and D with Orthopedics blood culture also grew MSSA Infectious diseases following and we are continuing with cefazolin SHRUTHI done on March 20th was negative for any kind of vegetation patient is still having a lot of purulent drainage  MRI showed AC joint septic arthritis with osteomyelitis of the distal clavicle and acromion"     3/15- left shoulder abscess culture -MSSA  3/17- repeat bedside I and D per orthopedic  2/2 blood cultures - MSSA  3/17- Repeat Blood cx x 2- negative at 24 hours     Patient underwent incision drainage left shoulder abscess, excision of left distal clavicle 03/25/2025 with Dr. Jefferson.      Interval Hx:   No acute events overnight.  Afebrile.  Hemodynamically stable.  Pain is well controlled.    Objective/physical exam:  Vitals:    03/29/25 0203 03/29/25 0332 03/29/25 0522 03/29/25 0602   BP:  114/75 132/84    Pulse:  76 69    Resp: 17   17   Temp:  97.6 °F (36.4 °C) 97.6 °F (36.4 °C)    TempSrc:  Oral Oral    SpO2:  97% 97%    Weight:       Height:         General: In no acute distress, " afebrile  Respiratory: Clear to auscultation bilaterally  Cardiovascular: S1, S2, no appreciable murmur  Abdomen: Soft, nontender, BS +    Neurologic: Alert and oriented x4, moving all extremities      Lab Results   Component Value Date     03/27/2025    K 4.5 03/27/2025     03/27/2025    CO2 27 03/27/2025    BUN 20.5 03/27/2025    CREATININE 0.68 (L) 03/27/2025    CALCIUM 9.1 03/27/2025    EGFRNONAA >60 07/17/2021      Lab Results   Component Value Date    ALT 16 03/21/2025    AST 14 03/21/2025    ALKPHOS 121 03/21/2025    BILITOT 0.3 03/21/2025      Lab Results   Component Value Date    WBC 11.68 (H) 03/27/2025    HGB 14.2 03/27/2025    HCT 43.2 03/27/2025    MCV 90.0 03/27/2025     (H) 03/27/2025           Medications:   aspirin  81 mg Oral Daily    ceFAZolin (Ancef) IV (PEDS and ADULTS)  2 g Intravenous Q8H    enoxparin  40 mg Subcutaneous Q24H (prophylaxis, 1700)    fenofibrate  145 mg Oral Daily    metoprolol succinate  25 mg Oral Daily    mupirocin   Nasal BID    naloxegoL  25 mg Oral Daily    nicotine  1 patch Transdermal Daily        Current Facility-Administered Medications:     acetaminophen, 1,000 mg, Oral, Q6H PRN    aluminum-magnesium hydroxide-simethicone, 30 mL, Oral, QID PRN    bisacodyL, 10 mg, Rectal, Daily PRN    dextrose 50%, 12.5 g, Intravenous, PRN    dextrose 50%, 25 g, Intravenous, PRN    glucagon (human recombinant), 1 mg, Intramuscular, PRN    glucose, 16 g, Oral, PRN    glucose, 24 g, Oral, PRN    hydrOXYzine pamoate, 50 mg, Oral, Q6H PRN    melatonin, 6 mg, Oral, Nightly PRN    naloxone, 0.02 mg, Intravenous, PRN    ondansetron, 4 mg, Intravenous, Q4H PRN    oxyCODONE, 5 mg, Oral, Q4H PRN    prochlorperazine, 5 mg, Intravenous, Q6H PRN    senna-docusate 8.6-50 mg, 1 tablet, Oral, BID PRN    sodium chloride 0.9%, 10 mL, Intravenous, PRN    Flushing PICC/Midline Protocol, , , Until Discontinued **AND** sodium chloride 0.9%, 10 mL, Intravenous, Q12H PRN      Assessment/Plan:    Left AC joint septic arthritis with osteomyelitis of the distal clavicle and acromion  MSSA bacteremia likely secondary to left shoulder abscess  Left shoulder abscess status post I and D - 03/15--> MSSA  Essential hypertension  History of MI/CAD, CMO 45% with RWMA, DD  Tobacco use      Plan:  -aerobic culture showed MSSA.  Fungal cultures in process.  continue surgical site care.  Awaiting transfer to [facility].  ID recommendations noted.  Continue Cefazolin.   6 weeks of antibiotics until 05/02/2025.    -fatou showed no signs of vegetations.  Needs outpatient cardiology follow up for EF 45%    Tamara Chen MD

## 2025-03-30 PROCEDURE — 11000001 HC ACUTE MED/SURG PRIVATE ROOM

## 2025-03-30 PROCEDURE — 25000003 PHARM REV CODE 250: Performed by: INTERNAL MEDICINE

## 2025-03-30 PROCEDURE — S4991 NICOTINE PATCH NONLEGEND: HCPCS | Performed by: STUDENT IN AN ORGANIZED HEALTH CARE EDUCATION/TRAINING PROGRAM

## 2025-03-30 PROCEDURE — 25000003 PHARM REV CODE 250: Performed by: NURSE PRACTITIONER

## 2025-03-30 PROCEDURE — 63600175 PHARM REV CODE 636 W HCPCS: Performed by: INTERNAL MEDICINE

## 2025-03-30 PROCEDURE — 25000003 PHARM REV CODE 250: Performed by: STUDENT IN AN ORGANIZED HEALTH CARE EDUCATION/TRAINING PROGRAM

## 2025-03-30 PROCEDURE — 63600175 PHARM REV CODE 636 W HCPCS: Performed by: STUDENT IN AN ORGANIZED HEALTH CARE EDUCATION/TRAINING PROGRAM

## 2025-03-30 RX ADMIN — HYDROXYZINE PAMOATE 50 MG: 50 CAPSULE ORAL at 07:03

## 2025-03-30 RX ADMIN — FENOFIBRATE 145 MG: 145 TABLET, FILM COATED ORAL at 09:03

## 2025-03-30 RX ADMIN — CEFAZOLIN SODIUM 2 G: 2 SOLUTION INTRAVENOUS at 09:03

## 2025-03-30 RX ADMIN — ALUMINUM HYDROXIDE, MAGNESIUM HYDROXIDE, AND DIMETHICONE 30 ML: 200; 20; 200 SUSPENSION ORAL at 10:03

## 2025-03-30 RX ADMIN — ENOXAPARIN SODIUM 40 MG: 40 INJECTION SUBCUTANEOUS at 05:03

## 2025-03-30 RX ADMIN — NALOXEGOL OXALATE 25 MG: 25 TABLET, FILM COATED ORAL at 09:03

## 2025-03-30 RX ADMIN — METOPROLOL SUCCINATE 25 MG: 25 TABLET, EXTENDED RELEASE ORAL at 09:03

## 2025-03-30 RX ADMIN — CEFAZOLIN SODIUM 2 G: 2 SOLUTION INTRAVENOUS at 11:03

## 2025-03-30 RX ADMIN — OXYCODONE HYDROCHLORIDE 5 MG: 5 TABLET ORAL at 07:03

## 2025-03-30 RX ADMIN — HYDROXYZINE PAMOATE 50 MG: 50 CAPSULE ORAL at 03:03

## 2025-03-30 RX ADMIN — NICOTINE 1 PATCH: 14 PATCH TRANSDERMAL at 09:03

## 2025-03-30 RX ADMIN — OXYCODONE HYDROCHLORIDE 5 MG: 5 TABLET ORAL at 03:03

## 2025-03-30 RX ADMIN — OXYCODONE HYDROCHLORIDE 5 MG: 5 TABLET ORAL at 11:03

## 2025-03-30 RX ADMIN — ASPIRIN 81 MG: 81 TABLET, COATED ORAL at 09:03

## 2025-03-30 RX ADMIN — CEFAZOLIN SODIUM 2 G: 2 SOLUTION INTRAVENOUS at 03:03

## 2025-03-30 RX ADMIN — HYDROXYZINE PAMOATE 50 MG: 50 CAPSULE ORAL at 10:03

## 2025-03-30 NOTE — PROGRESS NOTES
"[facility] Hospital Medicine Progress Note        Chief Complaint: Inpatient Follow-up for     HPI:    "Kimani Redd is a 59 y.o. male who  has a past medical history of Kidney stone.. The patient presented to [facility] on 3/15/2025 with a primary complaint of left shoulder pain.  Patient arrived from an outlying facility for concern of septic arthritis and abscess over the left shoulder.  Patient has not had no recent injury to his left shoulder or prior surgery.  Patient noted a bump that got worsened along with redness and warmth.  Status post I&D done in the ER.  Patient notes improvement in his shoulder pain and symptoms after I&D.  Labs were significant for leukocytosis along with the elevated inflammatory markers.  Patient was admitted to us for further workup   Left shoulder abscess was I and D on March 15th and that showed MSSA, patient had repeat bedside I and D with Orthopedics blood culture also grew MSSA Infectious diseases following and we are continuing with cefazolin SHRUTHI done on March 20th was negative for any kind of vegetation patient is still having a lot of purulent drainage  MRI showed AC joint septic arthritis with osteomyelitis of the distal clavicle and acromion"     3/15- left shoulder abscess culture -MSSA  3/17- repeat bedside I and D per orthopedic  2/2 blood cultures - MSSA  3/17- Repeat Blood cx x 2- negative at 24 hours     Patient underwent incision drainage left shoulder abscess, excision of left distal clavicle 03/25/2025 with Dr. Jefferson.      Interval Hx:   Hemodynamically stable.  Pain is well controlled with medications.  Doing well on room air.    Objective/physical exam:  Vitals:    03/29/25 1928 03/29/25 2207 03/30/25 0335 03/30/25 0338   BP: 116/74   134/87   Pulse: 80   76   Resp:  19 18 18   Temp: 98.1 °F (36.7 °C)   97.6 °F (36.4 °C)   TempSrc: Oral      SpO2: 96%   96%   Weight:       Height:         General: In no acute distress, " afebrile  Respiratory: Clear to auscultation bilaterally  Cardiovascular: S1, S2, no appreciable murmur  Abdomen: Soft, nontender, BS +    Neurologic: Alert and oriented x4, moving all extremities      Lab Results   Component Value Date     03/27/2025    K 4.5 03/27/2025     03/27/2025    CO2 27 03/27/2025    BUN 20.5 03/27/2025    CREATININE 0.68 (L) 03/27/2025    CALCIUM 9.1 03/27/2025    EGFRNONAA >60 07/17/2021      Lab Results   Component Value Date    ALT 16 03/21/2025    AST 14 03/21/2025    ALKPHOS 121 03/21/2025    BILITOT 0.3 03/21/2025      Lab Results   Component Value Date    WBC 11.68 (H) 03/27/2025    HGB 14.2 03/27/2025    HCT 43.2 03/27/2025    MCV 90.0 03/27/2025     (H) 03/27/2025           Medications:   aspirin  81 mg Oral Daily    ceFAZolin (Ancef) IV (PEDS and ADULTS)  2 g Intravenous Q8H    enoxparin  40 mg Subcutaneous Q24H (prophylaxis, 1700)    fenofibrate  145 mg Oral Daily    metoprolol succinate  25 mg Oral Daily    naloxegoL  25 mg Oral Daily    nicotine  1 patch Transdermal Daily        Current Facility-Administered Medications:     acetaminophen, 1,000 mg, Oral, Q6H PRN    aluminum-magnesium hydroxide-simethicone, 30 mL, Oral, QID PRN    bisacodyL, 10 mg, Rectal, Daily PRN    dextrose 50%, 12.5 g, Intravenous, PRN    dextrose 50%, 25 g, Intravenous, PRN    glucagon (human recombinant), 1 mg, Intramuscular, PRN    glucose, 16 g, Oral, PRN    glucose, 24 g, Oral, PRN    hydrOXYzine pamoate, 50 mg, Oral, Q6H PRN    melatonin, 6 mg, Oral, Nightly PRN    naloxone, 0.02 mg, Intravenous, PRN    ondansetron, 4 mg, Intravenous, Q4H PRN    oxyCODONE, 5 mg, Oral, Q4H PRN    prochlorperazine, 5 mg, Intravenous, Q6H PRN    senna-docusate 8.6-50 mg, 1 tablet, Oral, BID PRN    sodium chloride 0.9%, 10 mL, Intravenous, PRN    Flushing PICC/Midline Protocol, , , Until Discontinued **AND** sodium chloride 0.9%, 10 mL, Intravenous, Q12H PRN     Assessment/Plan:    Left AC joint  septic arthritis with osteomyelitis of the distal clavicle and acromion  MSSA bacteremia likely secondary to left shoulder abscess  Left shoulder abscess status post I and D - 03/15--> MSSA  Essential hypertension  History of MI/CAD, CMO 45% with RWMA, DD  Tobacco use      Plan:  -aerobic culture showed MSSA.  Fungal cultures in process.  continue surgical site care.  Awaiting transfer to [facility].  ID recommendations noted.  Continue Cefazolin.   6 weeks of antibiotics until 05/02/2025.    -fatou showed no signs of vegetations.  Needs outpatient cardiology follow up for EF 45%    Lovenox            Francisco Chen MD

## 2025-03-31 PROCEDURE — 25000003 PHARM REV CODE 250: Performed by: STUDENT IN AN ORGANIZED HEALTH CARE EDUCATION/TRAINING PROGRAM

## 2025-03-31 PROCEDURE — S4991 NICOTINE PATCH NONLEGEND: HCPCS | Performed by: STUDENT IN AN ORGANIZED HEALTH CARE EDUCATION/TRAINING PROGRAM

## 2025-03-31 PROCEDURE — 63600175 PHARM REV CODE 636 W HCPCS: Performed by: STUDENT IN AN ORGANIZED HEALTH CARE EDUCATION/TRAINING PROGRAM

## 2025-03-31 PROCEDURE — 25000003 PHARM REV CODE 250: Performed by: NURSE PRACTITIONER

## 2025-03-31 PROCEDURE — 25000003 PHARM REV CODE 250: Performed by: INTERNAL MEDICINE

## 2025-03-31 PROCEDURE — 63600175 PHARM REV CODE 636 W HCPCS: Performed by: INTERNAL MEDICINE

## 2025-03-31 PROCEDURE — 11000001 HC ACUTE MED/SURG PRIVATE ROOM

## 2025-03-31 RX ADMIN — CEFAZOLIN SODIUM 2 G: 2 SOLUTION INTRAVENOUS at 12:03

## 2025-03-31 RX ADMIN — ACETAMINOPHEN 1000 MG: 500 TABLET ORAL at 10:03

## 2025-03-31 RX ADMIN — OXYCODONE HYDROCHLORIDE 5 MG: 5 TABLET ORAL at 12:03

## 2025-03-31 RX ADMIN — FENOFIBRATE 145 MG: 145 TABLET, FILM COATED ORAL at 09:03

## 2025-03-31 RX ADMIN — OXYCODONE HYDROCHLORIDE 5 MG: 5 TABLET ORAL at 09:03

## 2025-03-31 RX ADMIN — CEFAZOLIN SODIUM 2 G: 2 SOLUTION INTRAVENOUS at 04:03

## 2025-03-31 RX ADMIN — OXYCODONE HYDROCHLORIDE 5 MG: 5 TABLET ORAL at 04:03

## 2025-03-31 RX ADMIN — NICOTINE 1 PATCH: 14 PATCH TRANSDERMAL at 09:03

## 2025-03-31 RX ADMIN — ASPIRIN 81 MG: 81 TABLET, COATED ORAL at 09:03

## 2025-03-31 RX ADMIN — NALOXEGOL OXALATE 25 MG: 25 TABLET, FILM COATED ORAL at 09:03

## 2025-03-31 RX ADMIN — ENOXAPARIN SODIUM 40 MG: 40 INJECTION SUBCUTANEOUS at 04:03

## 2025-03-31 RX ADMIN — CEFAZOLIN SODIUM 2 G: 2 SOLUTION INTRAVENOUS at 08:03

## 2025-03-31 RX ADMIN — Medication 6 MG: at 08:03

## 2025-03-31 RX ADMIN — OXYCODONE HYDROCHLORIDE 5 MG: 5 TABLET ORAL at 08:03

## 2025-03-31 RX ADMIN — HYDROXYZINE PAMOATE 50 MG: 50 CAPSULE ORAL at 04:03

## 2025-03-31 RX ADMIN — METOPROLOL SUCCINATE 25 MG: 25 TABLET, EXTENDED RELEASE ORAL at 09:03

## 2025-03-31 RX ADMIN — HYDROXYZINE PAMOATE 50 MG: 50 CAPSULE ORAL at 10:03

## 2025-03-31 NOTE — PROGRESS NOTES
"[facility] Hospital Medicine Progress Note        Chief Complaint: Inpatient Follow-up for     HPI:    "Kimani Redd is a 59 y.o. male who  has a past medical history of Kidney stone.. The patient presented to [facility] on 3/15/2025 with a primary complaint of left shoulder pain.  Patient arrived from an outlying facility for concern of septic arthritis and abscess over the left shoulder.  Patient has not had no recent injury to his left shoulder or prior surgery.  Patient noted a bump that got worsened along with redness and warmth.  Status post I&D done in the ER.  Patient notes improvement in his shoulder pain and symptoms after I&D.  Labs were significant for leukocytosis along with the elevated inflammatory markers.  Patient was admitted to us for further workup   Left shoulder abscess was I and D on March 15th and that showed MSSA, patient had repeat bedside I and D with Orthopedics blood culture also grew MSSA Infectious diseases following and we are continuing with cefazolin SHRUTHI done on March 20th was negative for any kind of vegetation patient is still having a lot of purulent drainage  MRI showed AC joint septic arthritis with osteomyelitis of the distal clavicle and acromion"     3/15- left shoulder abscess culture -MSSA  3/17- repeat bedside I and D per orthopedic  2/2 blood cultures - MSSA  3/17- Repeat Blood cx x 2- negative at 24 hours     Patient underwent incision drainage left shoulder abscess, excision of left distal clavicle 03/25/2025 with Dr. Jefferson.      Interval Hx:   No acute events overnight.  Family members at bedside.  He has no new complaints or concerns.  Pain is well controlled.    Objective/physical exam:  Vitals:    03/31/25 0008 03/31/25 0008 03/31/25 0350 03/31/25 0433   BP:  118/74 123/83    BP Location:       Patient Position:       Pulse:  71 80    Resp: 18   18   Temp:  97.8 °F (36.6 °C) 98.2 °F (36.8 °C)    TempSrc:  Oral Oral    SpO2:  95% 95%  "   Weight:       Height:         General: In no acute distress, afebrile  Respiratory: Clear to auscultation bilaterally  Cardiovascular: S1, S2, no appreciable murmur  Abdomen: Soft, nontender, BS +    Neurologic: Alert and oriented x4, moving all extremities      Lab Results   Component Value Date     03/27/2025    K 4.5 03/27/2025     03/27/2025    CO2 27 03/27/2025    BUN 20.5 03/27/2025    CREATININE 0.68 (L) 03/27/2025    CALCIUM 9.1 03/27/2025    EGFRNONAA >60 07/17/2021      Lab Results   Component Value Date    ALT 16 03/21/2025    AST 14 03/21/2025    ALKPHOS 121 03/21/2025    BILITOT 0.3 03/21/2025      Lab Results   Component Value Date    WBC 11.68 (H) 03/27/2025    HGB 14.2 03/27/2025    HCT 43.2 03/27/2025    MCV 90.0 03/27/2025     (H) 03/27/2025           Medications:   aspirin  81 mg Oral Daily    ceFAZolin (Ancef) IV (PEDS and ADULTS)  2 g Intravenous Q8H    enoxparin  40 mg Subcutaneous Q24H (prophylaxis, 1700)    fenofibrate  145 mg Oral Daily    metoprolol succinate  25 mg Oral Daily    naloxegoL  25 mg Oral Daily    nicotine  1 patch Transdermal Daily        Current Facility-Administered Medications:     acetaminophen, 1,000 mg, Oral, Q6H PRN    aluminum-magnesium hydroxide-simethicone, 30 mL, Oral, QID PRN    bisacodyL, 10 mg, Rectal, Daily PRN    dextrose 50%, 12.5 g, Intravenous, PRN    dextrose 50%, 25 g, Intravenous, PRN    glucagon (human recombinant), 1 mg, Intramuscular, PRN    glucose, 16 g, Oral, PRN    glucose, 24 g, Oral, PRN    hydrOXYzine pamoate, 50 mg, Oral, Q6H PRN    melatonin, 6 mg, Oral, Nightly PRN    naloxone, 0.02 mg, Intravenous, PRN    ondansetron, 4 mg, Intravenous, Q4H PRN    oxyCODONE, 5 mg, Oral, Q4H PRN    prochlorperazine, 5 mg, Intravenous, Q6H PRN    senna-docusate 8.6-50 mg, 1 tablet, Oral, BID PRN    sodium chloride 0.9%, 10 mL, Intravenous, PRN    Flushing PICC/Midline Protocol, , , Until Discontinued **AND** sodium chloride 0.9%, 10 mL,  Intravenous, Q12H PRN     Assessment/Plan:    Left AC joint septic arthritis with osteomyelitis of the distal clavicle and acromion  MSSA bacteremia likely secondary to left shoulder abscess  Left shoulder abscess status post I and D - 03/15--> MSSA  Essential hypertension  History of MI/CAD, CMO 45% with RWMA, DD  Tobacco use      Plan:  -aerobic culture showed MSSA.  Fungal cultures in process.  continue surgical site care.  Awaiting transfer to [facility].  ID recommendations noted.  Continue Cefazolin.   6 weeks of antibiotics until 05/02/2025.    -fatou showed no signs of vegetations.  Needs outpatient cardiology follow up for EF 45%  -okay for hospital privileges while inpatient    Lovenox            Francisco Chen MD

## 2025-03-31 NOTE — PLAN OF CARE
Reached out to [facility] with transfer center, still pending bed availability at [facility] at this time.

## 2025-04-01 PROCEDURE — 25000003 PHARM REV CODE 250: Performed by: STUDENT IN AN ORGANIZED HEALTH CARE EDUCATION/TRAINING PROGRAM

## 2025-04-01 PROCEDURE — 25000003 PHARM REV CODE 250: Performed by: NURSE PRACTITIONER

## 2025-04-01 PROCEDURE — 63600175 PHARM REV CODE 636 W HCPCS: Performed by: INTERNAL MEDICINE

## 2025-04-01 PROCEDURE — 25000003 PHARM REV CODE 250: Performed by: INTERNAL MEDICINE

## 2025-04-01 PROCEDURE — 11000001 HC ACUTE MED/SURG PRIVATE ROOM

## 2025-04-01 PROCEDURE — S4991 NICOTINE PATCH NONLEGEND: HCPCS | Performed by: STUDENT IN AN ORGANIZED HEALTH CARE EDUCATION/TRAINING PROGRAM

## 2025-04-01 RX ADMIN — HYDROXYZINE PAMOATE 50 MG: 50 CAPSULE ORAL at 02:04

## 2025-04-01 RX ADMIN — Medication 6 MG: at 08:04

## 2025-04-01 RX ADMIN — HYDROXYZINE PAMOATE 50 MG: 50 CAPSULE ORAL at 09:04

## 2025-04-01 RX ADMIN — METOPROLOL SUCCINATE 25 MG: 25 TABLET, EXTENDED RELEASE ORAL at 09:04

## 2025-04-01 RX ADMIN — NICOTINE 1 PATCH: 14 PATCH TRANSDERMAL at 09:04

## 2025-04-01 RX ADMIN — ASPIRIN 81 MG: 81 TABLET, COATED ORAL at 09:04

## 2025-04-01 RX ADMIN — OXYCODONE HYDROCHLORIDE 5 MG: 5 TABLET ORAL at 07:04

## 2025-04-01 RX ADMIN — OXYCODONE HYDROCHLORIDE 5 MG: 5 TABLET ORAL at 11:04

## 2025-04-01 RX ADMIN — CEFAZOLIN SODIUM 2 G: 2 SOLUTION INTRAVENOUS at 12:04

## 2025-04-01 RX ADMIN — NALOXEGOL OXALATE 25 MG: 25 TABLET, FILM COATED ORAL at 09:04

## 2025-04-01 RX ADMIN — FENOFIBRATE 145 MG: 145 TABLET, FILM COATED ORAL at 09:04

## 2025-04-01 RX ADMIN — OXYCODONE HYDROCHLORIDE 5 MG: 5 TABLET ORAL at 02:04

## 2025-04-01 RX ADMIN — HYDROXYZINE PAMOATE 50 MG: 50 CAPSULE ORAL at 03:04

## 2025-04-01 RX ADMIN — CEFAZOLIN SODIUM 2 G: 2 SOLUTION INTRAVENOUS at 07:04

## 2025-04-01 RX ADMIN — OXYCODONE HYDROCHLORIDE 5 MG: 5 TABLET ORAL at 03:04

## 2025-04-01 RX ADMIN — CEFAZOLIN SODIUM 2 G: 2 SOLUTION INTRAVENOUS at 04:04

## 2025-04-01 NOTE — PROGRESS NOTES
"[facility] Hospital Medicine Progress Note        Chief Complaint: Inpatient Follow-up for     HPI:    "Kimani Redd is a 59 y.o. male who  has a past medical history of Kidney stone.. The patient presented to [facility] on 3/15/2025 with a primary complaint of left shoulder pain.  Patient arrived from an outlying facility for concern of septic arthritis and abscess over the left shoulder.  Patient has not had no recent injury to his left shoulder or prior surgery.  Patient noted a bump that got worsened along with redness and warmth.  Status post I&D done in the ER.  Patient notes improvement in his shoulder pain and symptoms after I&D.  Labs were significant for leukocytosis along with the elevated inflammatory markers.  Patient was admitted to us for further workup   Left shoulder abscess was I and D on March 15th and that showed MSSA, patient had repeat bedside I and D with Orthopedics blood culture also grew MSSA Infectious diseases following and we are continuing with cefazolin SHRUTHI done on March 20th was negative for any kind of vegetation patient is still having a lot of purulent drainage  MRI showed AC joint septic arthritis with osteomyelitis of the distal clavicle and acromion"     3/15- left shoulder abscess culture -MSSA  3/17- repeat bedside I and D per orthopedic  2/2 blood cultures - MSSA  3/17- Repeat Blood cx x 2- negative at 24 hours     Patient underwent incision drainage left shoulder abscess, excision of left distal clavicle 03/25/2025 with Dr. Jefferson.    Interval Hx:   Patient was seen and evaluated at bedside, oxygenating well on room air.  Family in room.  We are awaiting insurance approval/arrangement of outpatient antibiotic.    Case was discussed with patient's nurse and  on the floor.    Objective/physical exam:  General: In no acute distress, afebrile  Chest: Clear to auscultation bilaterally  Heart: RRR, +S1, S2, no appreciable murmur  Abdomen: " Soft, nontender, BS +  MSK: Warm, no lower extremity edema, no clubbing or cyanosis, left shoulder with dressing in place.  Neurologic: Alert and oriented x4, Cranial nerve II-XII intact, Strength 5/5 in all 4 extremities    VITAL SIGNS: 24 HRS MIN & MAX LAST   Temp  Min: 97.8 °F (36.6 °C)  Max: 99.2 °F (37.3 °C) 99.2 °F (37.3 °C)   BP  Min: 109/73  Max: 152/74 109/73   Pulse  Min: 64  Max: 79  79   Resp  Min: 17  Max: 18 17   SpO2  Min: 96 %  Max: 98 % 98 %     I have reviewed the following labs:  Recent Labs   Lab 03/27/25  0310   WBC 11.68*   RBC 4.80   HGB 14.2   HCT 43.2   MCV 90.0   MCH 29.6   MCHC 32.9*   RDW 11.9   *   MPV 9.3     Recent Labs   Lab 03/27/25  0310      K 4.5      CO2 27   BUN 20.5   CREATININE 0.68*   CALCIUM 9.1     Microbiology Results (last 7 days)       Procedure Component Value Units Date/Time    Anaerobic Culture [[id number]] Collected: 03/25/25 1039    Order Status: Completed Specimen: Tissue from Clavicle, Left Updated: 03/28/25 0849     Anaerobe Culture No Anaerobes Isolated    Tissue Culture - Aerobic [[id number]]  (Abnormal)  (Susceptibility) Collected: 03/25/25 1039    Order Status: Completed Specimen: Tissue from Clavicle, Left Updated: 03/27/25 1008     Tissue - Aerobic Culture Moderate Methicillin Sensitive Staphylococcus aureus    Gram Stain [[id number]] Collected: 03/25/25 1039    Order Status: Completed Specimen: Tissue from Clavicle, Left Updated: 03/25/25 1520     GRAM STAIN Moderate WBC observed      Moderate Gram positive cocci    Fungal Culture [[id number]] Collected: 03/25/25 1039    Order Status: Sent Specimen: Tissue from Clavicle, Left Updated: 03/25/25 1045             See below for Radiology    Assessment/Plan:  Left AC joint septic arthritis with osteomyelitis of the distal clavicle and acromion  MSSA bacteremia likely secondary to left shoulder abscess  Left shoulder abscess status post I and D - 03/15--> MSSA  Essential hypertension  History  of MI/CAD, CMO 45% with RWMA, DD  Tobacco use        Plan:  -Aerobic culture showed MSSA.  Fungal cultures in process.  continue surgical site care.  Awaiting transfer to [facility].  ID recommendations noted.  Continue Cefazolin.   6 weeks of antibiotics until 05/02/2025.    -SHRUTHI showed no signs of vegetations.  Needs outpatient cardiology follow up for EF 45%  -Continue home medication  -Continue bowel regimen  -Ambulate as tolerated  -Continue with lab holiday    VTE prophylaxis:  Lovenox    Patient condition:  Stable    Anticipated discharge and Disposition:   Home at discharge; needs arrangement for antibiotics    All diagnosis and differential diagnosis have been reviewed; assessment and plan has been documented; I have personally reviewed the labs and test results that are presently available; I have reviewed the patients medication list; I have reviewed the consulting providers response and recommendations. I have reviewed or attempted to review medical records based upon their availability    All of the patient's questions have been  addressed and answered. Patient's is agreeable to the above stated plan. I will continue to monitor closely and make adjustments to medical management as needed.    Portions of this note dictated using EMR integrated voice recognition software, and may be subject to voice recognition errors not corrected at proofreading. Please contact writer for clarification if needed.   _____________________________________________________________________    Malnutrition Status:  Nutrition consulted. Most recent weight and BMI monitored-     Measurements:  Wt Readings from Last 1 Encounters:   03/15/25 74.8 kg (165 lb)   Body mass index is 27.46 kg/m².    Patient has been screened and assessed by RD.    Malnutrition Type:  Context:    Level:      Malnutrition Characteristic Summary:       Interventions/Recommendations (treatment strategy):        Scheduled Med:   aspirin  81 mg Oral Daily     ceFAZolin (Ancef) IV (PEDS and ADULTS)  2 g Intravenous Q8H    enoxparin  40 mg Subcutaneous Q24H (prophylaxis, 1700)    fenofibrate  145 mg Oral Daily    metoprolol succinate  25 mg Oral Daily    naloxegoL  25 mg Oral Daily    nicotine  1 patch Transdermal Daily      Continuous Infusions:     PRN Meds:    Current Facility-Administered Medications:     acetaminophen, 1,000 mg, Oral, Q6H PRN    aluminum-magnesium hydroxide-simethicone, 30 mL, Oral, QID PRN    bisacodyL, 10 mg, Rectal, Daily PRN    dextrose 50%, 12.5 g, Intravenous, PRN    dextrose 50%, 25 g, Intravenous, PRN    glucagon (human recombinant), 1 mg, Intramuscular, PRN    glucose, 16 g, Oral, PRN    glucose, 24 g, Oral, PRN    hydrOXYzine pamoate, 50 mg, Oral, Q6H PRN    melatonin, 6 mg, Oral, Nightly PRN    naloxone, 0.02 mg, Intravenous, PRN    ondansetron, 4 mg, Intravenous, Q4H PRN    oxyCODONE, 5 mg, Oral, Q4H PRN    prochlorperazine, 5 mg, Intravenous, Q6H PRN    senna-docusate 8.6-50 mg, 1 tablet, Oral, BID PRN    sodium chloride 0.9%, 10 mL, Intravenous, PRN    Flushing PICC/Midline Protocol, , , Until Discontinued **AND** sodium chloride 0.9%, 10 mL, Intravenous, Q12H PRN     Radiology:  I have personally reviewed the following imaging and agree with the radiologist.     SURG FL Surgery Fluoro Usage  See OP Notes for results.     IMPRESSION: See OP Notes for results.     This procedure was auto-finalized by: Virtual Radiologist      Theresa Ivey MD  Department of Hospital Medicine   [facility]   04/01/2025

## 2025-04-01 NOTE — PROGRESS NOTES
[facility] - 5th Floor Med Surg  Wound Care    Patient Name:  Kimani Redd   MRN:  [id number]  Date: 4/1/2025  Diagnosis: <principal problem not specified>    History:     Past Medical History:   Diagnosis Date    Kidney stone     passed 2 weeks ago       Social History[1]    Precautions:     Allergies as of 03/14/2025    (No Known Allergies)       WOC Assessment Details/Treatment     WOCN follow up. Attempted to see patient. Pt informed me that wound care has been done for the day by the nurse. Continue current treatment recommendations in place. Wound care will follow up.     04/01/2025         [1]   Social History  Socioeconomic History    Marital status:    Occupational History     Comment: employed   Tobacco Use    Smoking status: Every Day     Current packs/day: 0.50     Types: Cigarettes    Smokeless tobacco: Never   Substance and Sexual Activity    Alcohol use: Never    Drug use: Never    Sexual activity: Not Currently   Social History Narrative    ** Merged History Encounter **

## 2025-04-01 NOTE — PLAN OF CARE
Spoke to patient and his wife who is at bedside unable to afford cost of medication at home. Will continue to wait for bed at [facility]

## 2025-04-02 PROCEDURE — 63600175 PHARM REV CODE 636 W HCPCS: Performed by: INTERNAL MEDICINE

## 2025-04-02 PROCEDURE — 25000003 PHARM REV CODE 250: Performed by: STUDENT IN AN ORGANIZED HEALTH CARE EDUCATION/TRAINING PROGRAM

## 2025-04-02 PROCEDURE — 25000003 PHARM REV CODE 250: Performed by: NURSE PRACTITIONER

## 2025-04-02 PROCEDURE — 25000003 PHARM REV CODE 250: Performed by: INTERNAL MEDICINE

## 2025-04-02 PROCEDURE — 63600175 PHARM REV CODE 636 W HCPCS: Performed by: STUDENT IN AN ORGANIZED HEALTH CARE EDUCATION/TRAINING PROGRAM

## 2025-04-02 PROCEDURE — S4991 NICOTINE PATCH NONLEGEND: HCPCS | Performed by: STUDENT IN AN ORGANIZED HEALTH CARE EDUCATION/TRAINING PROGRAM

## 2025-04-02 PROCEDURE — 11000001 HC ACUTE MED/SURG PRIVATE ROOM

## 2025-04-02 RX ADMIN — HYDROXYZINE PAMOATE 50 MG: 50 CAPSULE ORAL at 03:04

## 2025-04-02 RX ADMIN — OXYCODONE HYDROCHLORIDE 5 MG: 5 TABLET ORAL at 01:04

## 2025-04-02 RX ADMIN — FENOFIBRATE 145 MG: 145 TABLET, FILM COATED ORAL at 09:04

## 2025-04-02 RX ADMIN — CEFAZOLIN SODIUM 2 G: 2 SOLUTION INTRAVENOUS at 04:04

## 2025-04-02 RX ADMIN — CEFAZOLIN SODIUM 2 G: 2 SOLUTION INTRAVENOUS at 02:04

## 2025-04-02 RX ADMIN — NICOTINE 1 PATCH: 14 PATCH TRANSDERMAL at 09:04

## 2025-04-02 RX ADMIN — Medication 6 MG: at 08:04

## 2025-04-02 RX ADMIN — HYDROXYZINE PAMOATE 50 MG: 50 CAPSULE ORAL at 09:04

## 2025-04-02 RX ADMIN — OXYCODONE HYDROCHLORIDE 5 MG: 5 TABLET ORAL at 07:04

## 2025-04-02 RX ADMIN — ASPIRIN 81 MG: 81 TABLET, COATED ORAL at 09:04

## 2025-04-02 RX ADMIN — HYDROXYZINE PAMOATE 50 MG: 50 CAPSULE ORAL at 01:04

## 2025-04-02 RX ADMIN — OXYCODONE HYDROCHLORIDE 5 MG: 5 TABLET ORAL at 10:04

## 2025-04-02 RX ADMIN — CEFAZOLIN SODIUM 2 G: 2 SOLUTION INTRAVENOUS at 08:04

## 2025-04-02 RX ADMIN — OXYCODONE HYDROCHLORIDE 5 MG: 5 TABLET ORAL at 02:04

## 2025-04-02 RX ADMIN — METOPROLOL SUCCINATE 25 MG: 25 TABLET, EXTENDED RELEASE ORAL at 09:04

## 2025-04-02 RX ADMIN — NALOXEGOL OXALATE 25 MG: 25 TABLET, FILM COATED ORAL at 09:04

## 2025-04-02 RX ADMIN — ENOXAPARIN SODIUM 40 MG: 40 INJECTION SUBCUTANEOUS at 04:04

## 2025-04-02 RX ADMIN — OXYCODONE HYDROCHLORIDE 5 MG: 5 TABLET ORAL at 06:04

## 2025-04-02 NOTE — PROGRESS NOTES
"[facility] Hospital Medicine Progress Note        Chief Complaint: Inpatient Follow-up for     HPI:    "Kimani Redd is a 59 y.o. male who  has a past medical history of Kidney stone.. The patient presented to [facility] on 3/15/2025 with a primary complaint of left shoulder pain.  Patient arrived from an outlying facility for concern of septic arthritis and abscess over the left shoulder.  Patient has not had no recent injury to his left shoulder or prior surgery.  Patient noted a bump that got worsened along with redness and warmth.  Status post I&D done in the ER.  Patient notes improvement in his shoulder pain and symptoms after I&D.  Labs were significant for leukocytosis along with the elevated inflammatory markers.  Patient was admitted to us for further workup   Left shoulder abscess was I and D on March 15th and that showed MSSA, patient had repeat bedside I and D with Orthopedics blood culture also grew MSSA Infectious diseases following and we are continuing with cefazolin SHRUTHI done on March 20th was negative for any kind of vegetation patient is still having a lot of purulent drainage  MRI showed AC joint septic arthritis with osteomyelitis of the distal clavicle and acromion"     3/15- left shoulder abscess culture -MSSA  3/17- repeat bedside I&D per orthopedic  2/2- blood cultures - MSSA  3/17- Repeat Blood cx x 2- negative at 24 hours     Patient underwent incision drainage left shoulder abscess, excision of left distal clavicle 03/25/2025 with Dr. Jefferson.    Interval Hx:   Patient was seen and evaluated at bedside, oxygenating well on room air.  Family in room.  We are awaiting insurance approval/arrangement of outpatient antibiotic. CM following.    Case was discussed with patient's nurse and  on the floor.    Objective/physical exam:  General: In no acute distress, afebrile  Chest: Clear to auscultation bilaterally  Heart: RRR, +S1, S2, no appreciable " murmur  Abdomen: Soft, nontender, BS +  MSK: Warm, no lower extremity edema, no clubbing or cyanosis, left shoulder with dressing in place.  Neurologic: Alert and oriented x4, Cranial nerve II-XII intact, Strength 5/5 in all 4 extremities    VITAL SIGNS: 24 HRS MIN & MAX LAST   Temp  Min: 97.9 °F (36.6 °C)  Max: 98.4 °F (36.9 °C) 97.9 °F (36.6 °C)   BP  Min: 107/73  Max: 134/75 116/73   Pulse  Min: 71  Max: 81  71   Resp  Min: 17  Max: 18 18   SpO2  Min: 97 %  Max: 98 % 98 %     I have reviewed the following labs:  Recent Labs   Lab 03/27/25  0310   WBC 11.68*   RBC 4.80   HGB 14.2   HCT 43.2   MCV 90.0   MCH 29.6   MCHC 32.9*   RDW 11.9   *   MPV 9.3     Recent Labs   Lab 03/27/25  0310      K 4.5      CO2 27   BUN 20.5   CREATININE 0.68*   CALCIUM 9.1     Microbiology Results (last 7 days)       Procedure Component Value Units Date/Time    Anaerobic Culture [[id number]] Collected: 03/25/25 1039    Order Status: Completed Specimen: Tissue from Clavicle, Left Updated: 03/28/25 0849     Anaerobe Culture No Anaerobes Isolated    Tissue Culture - Aerobic [[id number]]  (Abnormal)  (Susceptibility) Collected: 03/25/25 1039    Order Status: Completed Specimen: Tissue from Clavicle, Left Updated: 03/27/25 1008     Tissue - Aerobic Culture Moderate Methicillin Sensitive Staphylococcus aureus             See below for Radiology    Assessment/Plan:  Left AC joint septic arthritis with osteomyelitis of the distal clavicle and acromion  MSSA bacteremia likely secondary to left shoulder abscess  Left shoulder abscess status post I and D - 03/15--> MSSA  Essential hypertension  History of MI/CAD, CMO 45% with RWMA, DD  Tobacco use        Plan:  -Aerobic culture showed MSSA.  Fungal cultures in process.  continue surgical site care.  Awaiting transfer to [facility].    -ID recommendations noted.  Continue Cefazolin.   6 weeks of antibiotics End Date of 05/02/2025.    -SHRUTHI showed no signs of vegetations.  Needs  outpatient cardiology follow up for EF 45%  -Continue home medication  -Continue bowel regimen  -Ambulate as tolerated  -Labs in AM    VTE prophylaxis:  Lovenox    Patient condition:  Stable    Anticipated discharge and Disposition:   Home at discharge; needs arrangement for antibiotics    All diagnosis and differential diagnosis have been reviewed; assessment and plan has been documented; I have personally reviewed the labs and test results that are presently available; I have reviewed the patients medication list; I have reviewed the consulting providers response and recommendations. I have reviewed or attempted to review medical records based upon their availability    All of the patient's questions have been  addressed and answered. Patient's is agreeable to the above stated plan. I will continue to monitor closely and make adjustments to medical management as needed.    Portions of this note dictated using EMR integrated voice recognition software, and may be subject to voice recognition errors not corrected at proofreading. Please contact writer for clarification if needed.   _____________________________________________________________________    Malnutrition Status:  Nutrition consulted. Most recent weight and BMI monitored-     Measurements:  Wt Readings from Last 1 Encounters:   04/01/25 74.8 kg (165 lb)   Body mass index is 27.46 kg/m².    Patient has been screened and assessed by RD.    Malnutrition Type:  Context:    Level:      Malnutrition Characteristic Summary:       Interventions/Recommendations (treatment strategy):        Scheduled Med:   aspirin  81 mg Oral Daily    ceFAZolin (Ancef) IV (PEDS and ADULTS)  2 g Intravenous Q8H    enoxparin  40 mg Subcutaneous Q24H (prophylaxis, 1700)    fenofibrate  145 mg Oral Daily    metoprolol succinate  25 mg Oral Daily    naloxegoL  25 mg Oral Daily    nicotine  1 patch Transdermal Daily      Continuous Infusions:     PRN Meds:    Current  Facility-Administered Medications:     acetaminophen, 1,000 mg, Oral, Q6H PRN    aluminum-magnesium hydroxide-simethicone, 30 mL, Oral, QID PRN    bisacodyL, 10 mg, Rectal, Daily PRN    dextrose 50%, 12.5 g, Intravenous, PRN    dextrose 50%, 25 g, Intravenous, PRN    glucagon (human recombinant), 1 mg, Intramuscular, PRN    glucose, 16 g, Oral, PRN    glucose, 24 g, Oral, PRN    hydrOXYzine pamoate, 50 mg, Oral, Q6H PRN    melatonin, 6 mg, Oral, Nightly PRN    naloxone, 0.02 mg, Intravenous, PRN    ondansetron, 4 mg, Intravenous, Q4H PRN    oxyCODONE, 5 mg, Oral, Q4H PRN    prochlorperazine, 5 mg, Intravenous, Q6H PRN    senna-docusate, 1 tablet, Oral, BID PRN    sodium chloride 0.9%, 10 mL, Intravenous, PRN    Flushing PICC/Midline Protocol, , , Until Discontinued **AND** sodium chloride 0.9%, 10 mL, Intravenous, Q12H PRN     Radiology:  I have personally reviewed the following imaging and agree with the radiologist.     X-Ray Chest 1 View for Line/Tube Placement  Narrative: EXAMINATION:  XR CHEST 1 VIEW FOR LINE/TUBE PLACEMENT    CLINICAL HISTORY:  PICC placement;    TECHNIQUE:  Single frontal portable view of the chest was performed.    COMPARISON:  03/20/2025 there    FINDINGS:  Is a PICC line on the right with the distal end overlying superior vena cava.  Lungs are clear.  Heart size is within normal limits.  Costophrenic angles are clear.  Impression: No acute disease is seen    Electronically signed by: Vitaly Moreno MD  Date:    04/01/2025  Time:    20:10      Theresa Ivey MD  Department of Hospital Medicine   [facility]   04/02/2025

## 2025-04-02 NOTE — PROGRESS NOTES
[facility] - 5th Floor Med Surg  Wound Care    Patient Name:  Kimani Redd   MRN:  [id number]  Date: 4/2/2025  Diagnosis: <principal problem not specified>    History:     Past Medical History:   Diagnosis Date    Kidney stone     passed 2 weeks ago       Social History[1]    Precautions:     Allergies as of 03/14/2025    (No Known Allergies)       WO Assessment Details/Treatment      04/02/25 0947   WOCN Assessment   Visit Date 04/02/25   Visit Time 0947   Consult Type Follow Up   [facility] Wound   Wound surgical   Intervention chart review;coordination of care   Teaching on-going        Wound 03/25/25 1044 Incision Left Shoulder   Date First Assessed/Time First Assessed: 03/25/25 1044   Primary Wound Type: Incision  Side: Left  Location: Shoulder  Closure Method: Sutures  Additional Comments: 1/2 iodoform packing, island dressing   Wound Image    Dressing Appearance Moist drainage   Drainage Amount Small   Drainage Characteristics/Odor No odor;Purulent   Appearance Tan   Black (%), Wound Tissue Color 0 %   Red (%), Wound Tissue Color 100 %  (tan)   Yellow (%), Wound Tissue Color 0 %   Periwound Area Redness   Wound Edges Defined   Wound Length (cm) 3 cm   Wound Width (cm) 2.2 cm   Wound Depth (cm) 1.3 cm   Wound Volume (cm^3) 4.492 cm^3   Wound Surface Area (cm^2) 5.18 cm^2   Care Cleansed with:;Antimicrobial agent;Sterile normal saline;Other (see comments)  (vashe)   Dressing Applied;Other (comment)  (Idoform packing,abd, tape)   Packing packing removed;packed with  (Iodoform packing)       [facility] follow up for left shoulder wound. Discussed POC with nurse. No family at bedside.  Informed pt step by step on the care that was being done. Continue Current treatment recommendations Left Shoulder: removed dressing. Removed packing. Cleansed well with Vashe. Measurements obtained along with photos. Treatment performed: Pack with Iodoform, covered with ABD, apply tape.  Pt had no further questions or  concerns. Wound care to be performed daily. Wound care will follow up.     04/02/2025         [1]   Social History  Socioeconomic History    Marital status:    Occupational History     Comment: employed   Tobacco Use    Smoking status: Every Day     Current packs/day: 0.50     Types: Cigarettes    Smokeless tobacco: Never   Substance and Sexual Activity    Alcohol use: Never    Drug use: Never    Sexual activity: Not Currently   Social History Narrative    ** Merged History Encounter **          Social Drivers of Health     Financial Resource Strain: Low Risk  (4/1/2025)    Overall Financial Resource Strain (CARDIA)     Difficulty of Paying Living Expenses: Not hard at all   Food Insecurity: No Food Insecurity (4/1/2025)    Hunger Vital Sign     Worried About Running Out of Food in the Last Year: Never true     Ran Out of Food in the Last Year: Never true   Transportation Needs: No Transportation Needs (4/1/2025)    PRAPARE - Transportation     Lack of Transportation (Medical): No     Lack of Transportation (Non-Medical): No   Stress: No Stress Concern Present (4/1/2025)    [employer] [place] [employer] - Occupational Stress Questionnaire     Feeling of Stress : Not at all   Housing Stability: Low Risk  (4/1/2025)    Housing Stability Vital Sign     Unable to Pay for Housing in the Last Year: No     Number of Times Moved in the Last Year: 0     Homeless in the Last Year: No

## 2025-04-02 NOTE — NURSING
Assumed care of pt 1930, PICC line was noted to be hanging with portion of the line exposed. XR was obtained to confirm placement. MD was notified to review XR, PICC was noted to be in proper position. PICC line redressed and secured into place at this time.

## 2025-04-03 ENCOUNTER — HOSPITAL ENCOUNTER (INPATIENT)
Facility: HOSPITAL | Age: 60
LOS: 4 days | Discharge: HOME | DRG: 540 | End: 2025-04-07
Attending: PSYCHIATRY & NEUROLOGY | Admitting: STUDENT IN AN ORGANIZED HEALTH CARE EDUCATION/TRAINING PROGRAM
Payer: MEDICAID

## 2025-04-03 VITALS
HEIGHT: 65 IN | SYSTOLIC BLOOD PRESSURE: 126 MMHG | TEMPERATURE: 98 F | RESPIRATION RATE: 18 BRPM | DIASTOLIC BLOOD PRESSURE: 81 MMHG | OXYGEN SATURATION: 98 % | BODY MASS INDEX: 27.49 KG/M2 | HEART RATE: 75 BPM | WEIGHT: 165 LBS

## 2025-04-03 DIAGNOSIS — M86.9 OSTEOMYELITIS: ICD-10-CM

## 2025-04-03 DIAGNOSIS — I25.10 CORONARY ARTERY DISEASE, UNSPECIFIED VESSEL OR LESION TYPE, UNSPECIFIED WHETHER ANGINA PRESENT, UNSPECIFIED WHETHER NATIVE OR TRANSPLANTED HEART: ICD-10-CM

## 2025-04-03 DIAGNOSIS — M00.9 SEPTIC ARTHRITIS, DUE TO UNSPECIFIED ORGANISM, SEPTIC ARTHRITIS OF UNSPECIFIED LOCATION: Primary | ICD-10-CM

## 2025-04-03 LAB
ANION GAP SERPL CALC-SCNC: 11 MEQ/L
BASOPHILS # BLD AUTO: 0.06 X10(3)/MCL
BASOPHILS NFR BLD AUTO: 0.5 %
BUN SERPL-MCNC: 17.3 MG/DL (ref 8.4–25.7)
CALCIUM SERPL-MCNC: 9.4 MG/DL (ref 8.4–10.2)
CHLORIDE SERPL-SCNC: 103 MMOL/L (ref 98–107)
CO2 SERPL-SCNC: 25 MMOL/L (ref 22–29)
CREAT SERPL-MCNC: 0.78 MG/DL (ref 0.72–1.25)
CREAT/UREA NIT SERPL: 22
EOSINOPHIL # BLD AUTO: 0.5 X10(3)/MCL (ref 0–0.9)
EOSINOPHIL NFR BLD AUTO: 4.3 %
ERYTHROCYTE [DISTWIDTH] IN BLOOD BY AUTOMATED COUNT: 12 % (ref 11.5–17)
GFR SERPLBLD CREATININE-BSD FMLA CKD-EPI: >60 ML/MIN/1.73/M2
GLUCOSE SERPL-MCNC: 93 MG/DL (ref 74–100)
HCT VFR BLD AUTO: 43.4 % (ref 42–52)
HGB BLD-MCNC: 14.4 G/DL (ref 14–18)
IMM GRANULOCYTES # BLD AUTO: 0.05 X10(3)/MCL (ref 0–0.04)
IMM GRANULOCYTES NFR BLD AUTO: 0.4 %
LYMPHOCYTES # BLD AUTO: 3.87 X10(3)/MCL (ref 0.6–4.6)
LYMPHOCYTES NFR BLD AUTO: 32.9 %
MCH RBC QN AUTO: 29.6 PG (ref 27–31)
MCHC RBC AUTO-ENTMCNC: 33.2 G/DL (ref 33–36)
MCV RBC AUTO: 89.1 FL (ref 80–94)
MONOCYTES # BLD AUTO: 1.27 X10(3)/MCL (ref 0.1–1.3)
MONOCYTES NFR BLD AUTO: 10.8 %
NEUTROPHILS # BLD AUTO: 6.01 X10(3)/MCL (ref 2.1–9.2)
NEUTROPHILS NFR BLD AUTO: 51.1 %
NRBC BLD AUTO-RTO: 0 %
PLATELET # BLD AUTO: 451 X10(3)/MCL (ref 130–400)
PMV BLD AUTO: 9.4 FL (ref 7.4–10.4)
POTASSIUM SERPL-SCNC: 4.7 MMOL/L (ref 3.5–5.1)
RBC # BLD AUTO: 4.87 X10(6)/MCL (ref 4.7–6.1)
SODIUM SERPL-SCNC: 139 MMOL/L (ref 136–145)
WBC # BLD AUTO: 11.76 X10(3)/MCL (ref 4.5–11.5)

## 2025-04-03 PROCEDURE — 25000003 PHARM REV CODE 250: Performed by: STUDENT IN AN ORGANIZED HEALTH CARE EDUCATION/TRAINING PROGRAM

## 2025-04-03 PROCEDURE — 25000003 PHARM REV CODE 250: Performed by: NURSE PRACTITIONER

## 2025-04-03 PROCEDURE — 87040 BLOOD CULTURE FOR BACTERIA: CPT

## 2025-04-03 PROCEDURE — 25000003 PHARM REV CODE 250: Performed by: INTERNAL MEDICINE

## 2025-04-03 PROCEDURE — 85025 COMPLETE CBC W/AUTO DIFF WBC: CPT | Performed by: STUDENT IN AN ORGANIZED HEALTH CARE EDUCATION/TRAINING PROGRAM

## 2025-04-03 PROCEDURE — 25000003 PHARM REV CODE 250

## 2025-04-03 PROCEDURE — 36415 COLL VENOUS BLD VENIPUNCTURE: CPT

## 2025-04-03 PROCEDURE — 63600175 PHARM REV CODE 636 W HCPCS: Performed by: INTERNAL MEDICINE

## 2025-04-03 PROCEDURE — S4991 NICOTINE PATCH NONLEGEND: HCPCS | Performed by: STUDENT IN AN ORGANIZED HEALTH CARE EDUCATION/TRAINING PROGRAM

## 2025-04-03 PROCEDURE — 36415 COLL VENOUS BLD VENIPUNCTURE: CPT | Performed by: STUDENT IN AN ORGANIZED HEALTH CARE EDUCATION/TRAINING PROGRAM

## 2025-04-03 PROCEDURE — 11000001 HC ACUTE MED/SURG PRIVATE ROOM

## 2025-04-03 PROCEDURE — 80048 BASIC METABOLIC PNL TOTAL CA: CPT | Performed by: STUDENT IN AN ORGANIZED HEALTH CARE EDUCATION/TRAINING PROGRAM

## 2025-04-03 PROCEDURE — 63600175 PHARM REV CODE 636 W HCPCS: Performed by: STUDENT IN AN ORGANIZED HEALTH CARE EDUCATION/TRAINING PROGRAM

## 2025-04-03 RX ORDER — CEFAZOLIN SODIUM 2 G/50ML
2000 SOLUTION INTRAVENOUS EVERY 8 HOURS
Start: 2025-04-03

## 2025-04-03 RX ORDER — CEFAZOLIN 2 G/1
2 INJECTION, POWDER, FOR SOLUTION INTRAMUSCULAR; INTRAVENOUS
Status: DISCONTINUED | OUTPATIENT
Start: 2025-04-04 | End: 2025-04-08 | Stop reason: HOSPADM

## 2025-04-03 RX ORDER — ASPIRIN 81 MG/1
81 TABLET ORAL DAILY
Status: DISCONTINUED | OUTPATIENT
Start: 2025-04-04 | End: 2025-04-08 | Stop reason: HOSPADM

## 2025-04-03 RX ORDER — CEFAZOLIN SODIUM 1 G/3ML
2 INJECTION, POWDER, FOR SOLUTION INTRAMUSCULAR; INTRAVENOUS
Status: DISCONTINUED | OUTPATIENT
Start: 2025-04-03 | End: 2025-04-03

## 2025-04-03 RX ORDER — METOPROLOL SUCCINATE 25 MG/1
25 TABLET, EXTENDED RELEASE ORAL DAILY
Start: 2025-04-04 | End: 2026-04-04

## 2025-04-03 RX ORDER — POLYETHYLENE GLYCOL 3350 17 G/17G
17 POWDER, FOR SOLUTION ORAL DAILY
Status: DISCONTINUED | OUTPATIENT
Start: 2025-04-04 | End: 2025-04-08 | Stop reason: HOSPADM

## 2025-04-03 RX ORDER — OXYCODONE HYDROCHLORIDE 5 MG/1
5 TABLET ORAL EVERY 4 HOURS PRN
Refills: 0 | Status: DISCONTINUED | OUTPATIENT
Start: 2025-04-04 | End: 2025-04-08 | Stop reason: HOSPADM

## 2025-04-03 RX ORDER — ACETAMINOPHEN 325 MG/1
650 TABLET ORAL EVERY 6 HOURS PRN
Status: DISCONTINUED | OUTPATIENT
Start: 2025-04-03 | End: 2025-04-08 | Stop reason: HOSPADM

## 2025-04-03 RX ORDER — ENOXAPARIN SODIUM 100 MG/ML
40 INJECTION SUBCUTANEOUS EVERY 24 HOURS
Status: DISCONTINUED | OUTPATIENT
Start: 2025-04-04 | End: 2025-04-08 | Stop reason: HOSPADM

## 2025-04-03 RX ORDER — DIPHENHYDRAMINE HCL 25 MG
25 CAPSULE ORAL NIGHTLY PRN
Status: DISCONTINUED | OUTPATIENT
Start: 2025-04-03 | End: 2025-04-08 | Stop reason: HOSPADM

## 2025-04-03 RX ORDER — METOPROLOL SUCCINATE 25 MG/1
25 TABLET, EXTENDED RELEASE ORAL DAILY
Status: DISCONTINUED | OUTPATIENT
Start: 2025-04-04 | End: 2025-04-08 | Stop reason: HOSPADM

## 2025-04-03 RX ADMIN — HYDROXYZINE PAMOATE 50 MG: 50 CAPSULE ORAL at 01:04

## 2025-04-03 RX ADMIN — FENOFIBRATE 145 MG: 145 TABLET, FILM COATED ORAL at 08:04

## 2025-04-03 RX ADMIN — OXYCODONE HYDROCHLORIDE 5 MG: 5 TABLET ORAL at 11:04

## 2025-04-03 RX ADMIN — CEFAZOLIN SODIUM 2 G: 2 SOLUTION INTRAVENOUS at 08:04

## 2025-04-03 RX ADMIN — OXYCODONE HYDROCHLORIDE 5 MG: 5 TABLET ORAL at 02:04

## 2025-04-03 RX ADMIN — NICOTINE 1 PATCH: 14 PATCH TRANSDERMAL at 08:04

## 2025-04-03 RX ADMIN — ENOXAPARIN SODIUM 40 MG: 40 INJECTION SUBCUTANEOUS at 05:04

## 2025-04-03 RX ADMIN — CEFAZOLIN SODIUM 2 G: 2 SOLUTION INTRAVENOUS at 04:04

## 2025-04-03 RX ADMIN — OXYCODONE HYDROCHLORIDE 5 MG: 5 TABLET ORAL at 05:04

## 2025-04-03 RX ADMIN — HYDROXYZINE PAMOATE 50 MG: 50 CAPSULE ORAL at 02:04

## 2025-04-03 RX ADMIN — OXYCODONE HYDROCHLORIDE 5 MG: 5 TABLET ORAL at 06:04

## 2025-04-03 RX ADMIN — METOPROLOL SUCCINATE 25 MG: 25 TABLET, EXTENDED RELEASE ORAL at 08:04

## 2025-04-03 RX ADMIN — HYDROXYZINE PAMOATE 50 MG: 50 CAPSULE ORAL at 08:04

## 2025-04-03 RX ADMIN — OXYCODONE HYDROCHLORIDE 5 MG: 5 TABLET ORAL at 09:04

## 2025-04-03 RX ADMIN — OXYCODONE HYDROCHLORIDE 5 MG: 5 TABLET ORAL at 01:04

## 2025-04-03 RX ADMIN — CEFAZOLIN SODIUM 2 G: 2 SOLUTION INTRAVENOUS at 12:04

## 2025-04-03 RX ADMIN — NALOXEGOL OXALATE 25 MG: 25 TABLET, FILM COATED ORAL at 08:04

## 2025-04-03 RX ADMIN — ASPIRIN 81 MG: 81 TABLET, COATED ORAL at 08:04

## 2025-04-03 RX ADMIN — DIPHENHYDRAMINE HYDROCHLORIDE 25 MG: 25 CAPSULE ORAL at 11:04

## 2025-04-03 NOTE — PROGRESS NOTES
Inpatient Nutrition Evaluation    Admit Date: 3/15/2025   Total duration of encounter: 19 days   Patient Age: 59 y.o.    Nutrition Recommendation/Prescription     Continue regular diet.  Terry (provides 90 kcal, 2.5 g protein per serving) BID to support wound healing.   Obtain standing weight.     Nutrition Assessment     Chart Review    Reason Seen: length of stay and follow-up    Malnutrition Screening Tool Results   Have you recently lost weight without trying?: No  Have you been eating poorly because of a decreased appetite?: No   MST Score: 0   Diagnosis:  MSSA bacteremia likely secondary to left shoulder abscess  Left shoulder abscess status post I and D - 03/15--> MSSA  Essential hypertension    Relevant Medical History: MI/CAD, CMO 45% with RWMA, DD     Scheduled Medications:  aspirin, 81 mg, Daily  ceFAZolin (Ancef) IV (PEDS and ADULTS), 2 g, Q8H  enoxparin, 40 mg, Q24H (prophylaxis, 1700)  fenofibrate, 145 mg, Daily  metoprolol succinate, 25 mg, Daily  naloxegoL, 25 mg, Daily  nicotine, 1 patch, Daily    Continuous Infusions:   PRN Medications:   Current Facility-Administered Medications:     acetaminophen, 1,000 mg, Oral, Q6H PRN    aluminum-magnesium hydroxide-simethicone, 30 mL, Oral, QID PRN    bisacodyL, 10 mg, Rectal, Daily PRN    dextrose 50%, 12.5 g, Intravenous, PRN    dextrose 50%, 25 g, Intravenous, PRN    glucagon (human recombinant), 1 mg, Intramuscular, PRN    glucose, 16 g, Oral, PRN    glucose, 24 g, Oral, PRN    hydrOXYzine pamoate, 50 mg, Oral, Q6H PRN    melatonin, 6 mg, Oral, Nightly PRN    naloxone, 0.02 mg, Intravenous, PRN    ondansetron, 4 mg, Intravenous, Q4H PRN    oxyCODONE, 5 mg, Oral, Q4H PRN    prochlorperazine, 5 mg, Intravenous, Q6H PRN    senna-docusate, 1 tablet, Oral, BID PRN    sodium chloride 0.9%, 10 mL, Intravenous, PRN    Flushing PICC/Midline Protocol, , , Until Discontinued **AND** sodium chloride 0.9%, 10 mL, Intravenous, Q12H PRN    Recent Labs   Lab  "25  0600      K 4.7   CALCIUM 9.4      CO2 25   BUN 17.3   CREATININE 0.78   EGFRNORACEVR >60   GLUCOSE 93   WBC 11.76*   HGB 14.4   HCT 43.4     Nutrition Orders:  Diet Adult Regular Standard Tray  Dietary nutrition supplements BID; Terry - Any flavor    Appetite/Oral Intake: good/% of meals  Factors Affecting Nutritional Intake:  preferences; being in the hospital  Food/Temple/Cultural Preferences: none reported  Food Allergies: none reported  Last Bowel Movement: 25  Wound(s):[REMOVED]      Wound 03/15/25 0110 Abscess Left Shoulder-Tissue loss description: Full thickness     Comments    3/21/25 Reports decreased appetite and ~5lbs weight loss PTA s/t pain. Appetite has improved since admit and tolerating, just with decreased intake s/t preferences. Likes cajun/seasoned meals. Politely declined Boost oral supplement at this time. Will send Terry to trial for full thickness abscess on shoulder. UBW ranges from 160-165lbs.     3/27/25 Patient reports good oral intake of meals served. Drinking Terry supplement BID.     4/3/25 Reports good appetite and intake. Drinking Terry occasionally. Denies any GI complaints.     Anthropometrics    Height: 5' 5" (165.1 cm), Height Method: Stated  Last Weight: 74.8 kg (165 lb) (25), Weight Method: Bed Scale  BMI (Calculated): 27.5  BMI Classification: overweight (BMI 25-29.9)        Ideal Body Weight (IBW), Male: 136 lb     % Ideal Body Weight, Male (lb): 121.32 %                 Usual Body Weight (UBW), k.7 kg  % Usual Body Weight: 103.16     Usual Weight Provided By: patient    Wt Readings from Last 5 Encounters:   25 74.8 kg (165 lb)   25 74.8 kg (165 lb)   25 72.9 kg (160 lb 11.2 oz)   25 74.8 kg (165 lb)   25 74.8 kg (165 lb)     Weight Change(s) Since Admission: stable  Wt Readings from Last 1 Encounters:   25 74.8 kg (165 lb)   03/15/25 1202 74.8 kg (165 lb)   03/15/25 0033 74.8 kg " (165 lb)   Admit Weight: 74.8 kg (165 lb) (03/15/25 0033), Weight Method: Stated    Patient Education     Not applicable.    Nutrition Goals & Monitoring     Dietitian will monitor: energy intake    Nutrition Risk/Follow-Up: low (follow-up in 5-7 days)  Patients assigned 'low nutrition risk' status do not qualify for a full nutritional assessment but will be monitored and re-evaluated in a 5-7 day time period. Please consult if re-evaluation needed sooner.

## 2025-04-03 NOTE — PROGRESS NOTES
"This is a christopher Hemal quintero [facility] Hospital Medicine Progress Note        Chief Complaint: Inpatient Follow-up for     HPI: 59 y.o. male who  has a past medical history of Kidney stone.. The patient presented to [facility] on 3/15/2025 with a primary complaint of left shoulder pain.  Patient arrived from an outlying facility for concern of septic arthritis and abscess over the left shoulder.  Patient has not had no recent injury to his left shoulder or prior surgery.  Patient noted a bump that got worsened along with redness and warmth.  Status post I&D done in the ER.  Patient notes improvement in his shoulder pain and symptoms after I&D.  Labs were significant for leukocytosis along with the elevated inflammatory markers.  Patient was admitted to us for further workup   Left shoulder abscess was I and D on March 15th and that showed MSSA, patient had repeat bedside I and D with Orthopedics blood culture also grew MSSA Infectious diseases following and we are continuing with cefazolin SHRUTHI done on March 20th was negative for any kind of vegetation patient is still having a lot of purulent drainage  MRI showed AC joint septic arthritis with osteomyelitis of the distal clavicle and acromion" patient had the drainage of the abscess that showed MSSA repeat blood cultures remain negative patient also underwent drainage with excision of the left distal clavicle on March 25th by Dr. Jefferson ID is recommending 6 weeks of antibiotics with end date of May 2nd    Interval Hx:   Patient seen and examined this morning denied any complaints but reports he is tired of staying in the room  Case was discussed with patient's nurse and  on the floor.    Objective/physical exam:  General: In no acute distress, afebrile  Chest: Clear to auscultation bilaterally  Heart: RRR, +S1, S2, no appreciable murmur  Abdomen: Soft, nontender, BS +  MSK: Warm, no lower extremity edema, no clubbing or " cyanosis  Neurologic: Alert and oriented x4,   Temp  Min: 97.6 °F (36.4 °C)  Max: 97.9 °F (36.6 °C) 97.6 °F (36.4 °C)   BP  Min: 116/77  Max: 149/85 116/77   Pulse  Min: 70  Max: 81  75   Resp  Min: 18  Max: 18 18   SpO2  Min: 98 %  Max: 99 % 99 %     I have reviewed the following labs:  Recent Labs   Lab 04/03/25  0600   WBC 11.76*   RBC 4.87   HGB 14.4   HCT 43.4   MCV 89.1   MCH 29.6   MCHC 33.2   RDW 12.0   *   MPV 9.4     Recent Labs   Lab 04/03/25  0600      K 4.7      CO2 25   BUN 17.3   CREATININE 0.78   CALCIUM 9.4     Microbiology Results (last 7 days)       Procedure Component Value Units Date/Time    Anaerobic Culture [[id number]] Collected: 03/25/25 1039    Order Status: Completed Specimen: Tissue from Clavicle, Left Updated: 03/28/25 0849     Anaerobe Culture No Anaerobes Isolated    Tissue Culture - Aerobic [[id number]]  (Abnormal)  (Susceptibility) Collected: 03/25/25 1039    Order Status: Completed Specimen: Tissue from Clavicle, Left Updated: 03/27/25 1008     Tissue - Aerobic Culture Moderate Methicillin Sensitive Staphylococcus aureus             See below for Radiology    Assessment/Plan:  Left AC joint septic arthritis with osteomyelitis of the distal clavicle and acromion  MSSA bacteremia likely secondary to left shoulder abscess  Left shoulder abscess status post I and D - 03/15--> MSSA  Essential hypertension  History of MI/CAD, CMO 45% with RWMA, DD  Tobacco use     culture showed MSSA. .    ID  recommendations noted.  Continue Cefazolin.   6 weeks of antibiotics End Date of 05/02/2025  SHRUTHI NEGATIVE   No plans for any further operative intervention at this point as per last orthopedic note   Continue with wound care      VTE prophylaxis: lovenox    Patient condition:  Stable/Fair/Guarded/ Serious/ Critical    Anticipated discharge and Disposition:         All diagnosis and differential diagnosis have been reviewed; assessment and plan has been documented; I have  personally reviewed the labs and test results that are presently available; I have reviewed the patients medication list; I have reviewed the consulting providers response and recommendations. I have reviewed or attempted to review medical records based upon their availability    All of the patient's questions have been  addressed and answered. Patient's is agreeable to the above stated plan. I will continue to monitor closely and make adjustments to medical management as needed.    Portions of this note dictated using EMR integrated voice recognition software, and may be subject to voice recognition errors not corrected at proofreading. Please contact writer for clarification if needed.   _____________________________________________________________________    Malnutrition Status:  Nutrition consulted. Most recent weight and BMI monitored-     Measurements:  Wt Readings from Last 1 Encounters:   04/01/25 74.8 kg (165 lb)   Body mass index is 27.46 kg/m².    Patient has been screened and assessed by RD.    Malnutrition Type:  Context:    Level:      Malnutrition Characteristic Summary:       Interventions/Recommendations (treatment strategy):        Scheduled Med:   aspirin  81 mg Oral Daily    ceFAZolin (Ancef) IV (PEDS and ADULTS)  2 g Intravenous Q8H    enoxparin  40 mg Subcutaneous Q24H (prophylaxis, 1700)    fenofibrate  145 mg Oral Daily    metoprolol succinate  25 mg Oral Daily    naloxegoL  25 mg Oral Daily    nicotine  1 patch Transdermal Daily      Continuous Infusions:     PRN Meds:    Current Facility-Administered Medications:     acetaminophen, 1,000 mg, Oral, Q6H PRN    aluminum-magnesium hydroxide-simethicone, 30 mL, Oral, QID PRN    bisacodyL, 10 mg, Rectal, Daily PRN    dextrose 50%, 12.5 g, Intravenous, PRN    dextrose 50%, 25 g, Intravenous, PRN    glucagon (human recombinant), 1 mg, Intramuscular, PRN    glucose, 16 g, Oral, PRN    glucose, 24 g, Oral, PRN    hydrOXYzine pamoate, 50 mg, Oral, Q6H  PRN    melatonin, 6 mg, Oral, Nightly PRN    naloxone, 0.02 mg, Intravenous, PRN    ondansetron, 4 mg, Intravenous, Q4H PRN    oxyCODONE, 5 mg, Oral, Q4H PRN    prochlorperazine, 5 mg, Intravenous, Q6H PRN    senna-docusate, 1 tablet, Oral, BID PRN    sodium chloride 0.9%, 10 mL, Intravenous, PRN    Flushing PICC/Midline Protocol, , , Until Discontinued **AND** sodium chloride 0.9%, 10 mL, Intravenous, Q12H PRN     Radiology:  I have personally reviewed the following imaging and agree with the radiologist.     X-Ray Chest 1 View for Line/Tube Placement  Narrative: EXAMINATION:  XR CHEST 1 VIEW FOR LINE/TUBE PLACEMENT    CLINICAL HISTORY:  PICC placement;    TECHNIQUE:  Single frontal portable view of the chest was performed.    COMPARISON:  03/20/2025 there    FINDINGS:  Is a PICC line on the right with the distal end overlying superior vena cava.  Lungs are clear.  Heart size is within normal limits.  Costophrenic angles are clear.  Impression: No acute disease is seen    Electronically signed by: Vitaly Moreno MD  Date:    04/01/2025  Time:    20:10      Mirian Recio MD  Department of Hospital Medicine   [facility]   04/03/2025

## 2025-04-04 LAB
ALBUMIN SERPL-MCNC: 3.5 G/DL (ref 3.5–5)
ALBUMIN/GLOB SERPL: 0.8 RATIO (ref 1.1–2)
ALP SERPL-CCNC: 89 UNIT/L (ref 40–150)
ALT SERPL-CCNC: 11 UNIT/L (ref 0–55)
ANION GAP SERPL CALC-SCNC: 7 MEQ/L
AST SERPL-CCNC: 16 UNIT/L (ref 11–45)
BASOPHILS # BLD AUTO: 0.05 X10(3)/MCL
BASOPHILS NFR BLD AUTO: 0.5 %
BILIRUB SERPL-MCNC: 0.4 MG/DL
BUN SERPL-MCNC: 15.5 MG/DL (ref 8.4–25.7)
C TRACH DNA SPEC QL NAA+PROBE: NOT DETECTED
CALCIUM SERPL-MCNC: 9.7 MG/DL (ref 8.4–10.2)
CHLORIDE SERPL-SCNC: 106 MMOL/L (ref 98–107)
CO2 SERPL-SCNC: 26 MMOL/L (ref 22–29)
CREAT SERPL-MCNC: 0.72 MG/DL (ref 0.72–1.25)
CREAT/UREA NIT SERPL: 22
EOSINOPHIL # BLD AUTO: 0.48 X10(3)/MCL (ref 0–0.9)
EOSINOPHIL NFR BLD AUTO: 4.6 %
ERYTHROCYTE [DISTWIDTH] IN BLOOD BY AUTOMATED COUNT: 11.8 % (ref 11.5–17)
GFR SERPLBLD CREATININE-BSD FMLA CKD-EPI: >60 ML/MIN/1.73/M2
GLOBULIN SER-MCNC: 4.5 GM/DL (ref 2.4–3.5)
GLUCOSE SERPL-MCNC: 93 MG/DL (ref 74–100)
HAV IGM SERPL QL IA: NONREACTIVE
HBV CORE IGM SERPL QL IA: NONREACTIVE
HBV SURFACE AB SER-ACNC: 1.38 MIU/ML
HBV SURFACE AB SERPL IA-ACNC: NONREACTIVE M[IU]/ML
HBV SURFACE AG SERPL QL IA: NONREACTIVE
HCT VFR BLD AUTO: 43.5 % (ref 42–52)
HCV AB SERPL QL IA: NONREACTIVE
HGB BLD-MCNC: 14.6 G/DL (ref 14–18)
HOLD SPECIMEN: NORMAL
IMM GRANULOCYTES # BLD AUTO: 0.03 X10(3)/MCL (ref 0–0.04)
IMM GRANULOCYTES NFR BLD AUTO: 0.3 %
LYMPHOCYTES # BLD AUTO: 2.99 X10(3)/MCL (ref 0.6–4.6)
LYMPHOCYTES NFR BLD AUTO: 28.9 %
MAGNESIUM SERPL-MCNC: 2 MG/DL (ref 1.6–2.6)
MCH RBC QN AUTO: 29.7 PG (ref 27–31)
MCHC RBC AUTO-ENTMCNC: 33.6 G/DL (ref 33–36)
MCV RBC AUTO: 88.4 FL (ref 80–94)
MONOCYTES # BLD AUTO: 1.19 X10(3)/MCL (ref 0.1–1.3)
MONOCYTES NFR BLD AUTO: 11.5 %
N GONORRHOEA DNA SPEC QL NAA+PROBE: NOT DETECTED
NEUTROPHILS # BLD AUTO: 5.59 X10(3)/MCL (ref 2.1–9.2)
NEUTROPHILS NFR BLD AUTO: 54.2 %
NRBC BLD AUTO-RTO: 0 %
PHOSPHATE SERPL-MCNC: 3.1 MG/DL (ref 2.3–4.7)
PLATELET # BLD AUTO: 476 X10(3)/MCL (ref 130–400)
PMV BLD AUTO: 9.5 FL (ref 7.4–10.4)
POTASSIUM SERPL-SCNC: 4.3 MMOL/L (ref 3.5–5.1)
PROT SERPL-MCNC: 8 GM/DL (ref 6.4–8.3)
RBC # BLD AUTO: 4.92 X10(6)/MCL (ref 4.7–6.1)
SODIUM SERPL-SCNC: 139 MMOL/L (ref 136–145)
SOURCE (OHS): NORMAL
WBC # BLD AUTO: 10.33 X10(3)/MCL (ref 4.5–11.5)

## 2025-04-04 PROCEDURE — 36415 COLL VENOUS BLD VENIPUNCTURE: CPT | Performed by: INTERNAL MEDICINE

## 2025-04-04 PROCEDURE — C1751 CATH, INF, PER/CENT/MIDLINE: HCPCS

## 2025-04-04 PROCEDURE — 25000003 PHARM REV CODE 250

## 2025-04-04 PROCEDURE — 80053 COMPREHEN METABOLIC PANEL: CPT

## 2025-04-04 PROCEDURE — 02HV33Z INSERTION OF INFUSION DEVICE INTO SUPERIOR VENA CAVA, PERCUTANEOUS APPROACH: ICD-10-PCS | Performed by: INTERNAL MEDICINE

## 2025-04-04 PROCEDURE — 11000001 HC ACUTE MED/SURG PRIVATE ROOM

## 2025-04-04 PROCEDURE — 80074 ACUTE HEPATITIS PANEL: CPT | Performed by: NURSE PRACTITIONER

## 2025-04-04 PROCEDURE — 36415 COLL VENOUS BLD VENIPUNCTURE: CPT

## 2025-04-04 PROCEDURE — 63600175 PHARM REV CODE 636 W HCPCS

## 2025-04-04 PROCEDURE — S4991 NICOTINE PATCH NONLEGEND: HCPCS

## 2025-04-04 PROCEDURE — 85025 COMPLETE CBC W/AUTO DIFF WBC: CPT

## 2025-04-04 PROCEDURE — 87491 CHLMYD TRACH DNA AMP PROBE: CPT | Performed by: NURSE PRACTITIONER

## 2025-04-04 PROCEDURE — 86706 HEP B SURFACE ANTIBODY: CPT | Performed by: NURSE PRACTITIONER

## 2025-04-04 PROCEDURE — 84100 ASSAY OF PHOSPHORUS: CPT

## 2025-04-04 PROCEDURE — 83735 ASSAY OF MAGNESIUM: CPT

## 2025-04-04 RX ORDER — IBUPROFEN 200 MG
1 TABLET ORAL DAILY
Status: DISCONTINUED | OUTPATIENT
Start: 2025-04-04 | End: 2025-04-08 | Stop reason: HOSPADM

## 2025-04-04 RX ORDER — SODIUM CHLORIDE 0.9 % (FLUSH) 0.9 %
10 SYRINGE (ML) INJECTION EVERY 12 HOURS PRN
Status: DISCONTINUED | OUTPATIENT
Start: 2025-04-04 | End: 2025-04-08 | Stop reason: HOSPADM

## 2025-04-04 RX ORDER — HYDROXYZINE PAMOATE 25 MG/1
50 CAPSULE ORAL EVERY 6 HOURS PRN
Status: DISCONTINUED | OUTPATIENT
Start: 2025-04-04 | End: 2025-04-08 | Stop reason: HOSPADM

## 2025-04-04 RX ADMIN — ENOXAPARIN SODIUM 40 MG: 40 INJECTION SUBCUTANEOUS at 04:04

## 2025-04-04 RX ADMIN — HYDROXYZINE PAMOATE 50 MG: 25 CAPSULE ORAL at 07:04

## 2025-04-04 RX ADMIN — POLYETHYLENE GLYCOL 3350 17 G: 17 POWDER, FOR SOLUTION ORAL at 08:04

## 2025-04-04 RX ADMIN — ASPIRIN 81 MG: 81 TABLET, COATED ORAL at 08:04

## 2025-04-04 RX ADMIN — OXYCODONE HYDROCHLORIDE 5 MG: 5 TABLET ORAL at 05:04

## 2025-04-04 RX ADMIN — OXYCODONE HYDROCHLORIDE 5 MG: 5 TABLET ORAL at 07:04

## 2025-04-04 RX ADMIN — NICOTINE 1 PATCH: 14 PATCH TRANSDERMAL at 12:04

## 2025-04-04 RX ADMIN — HYDROXYZINE PAMOATE 50 MG: 25 CAPSULE ORAL at 08:04

## 2025-04-04 RX ADMIN — METOPROLOL SUCCINATE 25 MG: 25 TABLET, EXTENDED RELEASE ORAL at 08:04

## 2025-04-04 RX ADMIN — OXYCODONE HYDROCHLORIDE 5 MG: 5 TABLET ORAL at 10:04

## 2025-04-04 RX ADMIN — CEFAZOLIN 2 G: 2 INJECTION, POWDER, FOR SOLUTION INTRAMUSCULAR; INTRAVENOUS at 11:04

## 2025-04-04 RX ADMIN — OXYCODONE HYDROCHLORIDE 5 MG: 5 TABLET ORAL at 03:04

## 2025-04-04 RX ADMIN — DIPHENHYDRAMINE HYDROCHLORIDE 25 MG: 25 CAPSULE ORAL at 09:04

## 2025-04-04 NOTE — PLAN OF CARE
04/04/25 1505   Discharge Assessment   Assessment Type Discharge Planning Assessment   Confirmed/corrected address, phone number and insurance Yes   Confirmed Demographics Correct on Facesheet   Source of Information patient   When was your last doctors appointment?   (PCP: Jessi Leyva)   Does patient/caregiver understand observation status   (Inpatient)   Communicated WON with patient/caregiver Date not available/Unable to determine   Reason For Admission M86.9 Osteomyelitis (Osteomyelitis)   People in Home child(joe), adult;spouse   Facility Arrived From: Home   Do you expect to return to your current living situation? Yes   Do you have help at home or someone to help you manage your care at home? Yes   Who are your caregiver(s) and their phone number(s)? Mary Redd, spouse, P: [phone]   Prior to hospitilization cognitive status: Alert/Oriented;No Deficits   Current cognitive status: Alert/Oriented;No Deficits   Walking or Climbing Stairs Difficulty no   Dressing/Bathing Difficulty no   Home Accessibility wheelchair accessible   Home Layout Able to live on 1st floor   Equipment Currently Used at Home none   Readmission within 30 days? No   Patient currently being followed by outpatient case management? No   Do you currently have service(s) that help you manage your care at home? No   Do you take prescription medications? Yes   Do you have prescription coverage? No   Do you have any problems affording any of your prescribed medications? TBD   Is the patient taking medications as prescribed? yes   Who is going to help you get home at discharge? Family   How do you get to doctors appointments? car, drives self   Are you on dialysis? No   Do you take coumadin? No   Discharge Plan A Home with family   DME Needed Upon Discharge  none   Discharge Plan discussed with: Patient   Transition of Care Barriers Unisured   Physical Activity   On average, how many days per week do you engage in moderate to  strenuous exercise (like a brisk walk)? 5 days   On average, how many minutes do you engage in exercise at this level? 150+ min   Alcohol Use   Q1: How often do you have a drink containing alcohol? Never   Q2: How many drinks containing alcohol do you have on a typical day when you are drinking? None   Q3: How often do you have six or more drinks on one occasion? Never   OTHER   Name(s) of People in Home Marymelly Redd, spouse, P: [phone]; Myles Redd, son     Patient is  and lives with her spouse and adult son; independent with ADL's; works for the [employer]; CM will follow for DC planning needs.

## 2025-04-04 NOTE — CONSULTS
Consult for OPAT noted. Patient is uninsured, but is interested in pricing to possibly pay out of pocket. He does not have a preference as to home infusion company. Referral has been sent to [employer] via [employer].

## 2025-04-04 NOTE — PROGRESS NOTES
U Internal Medicine History and Physical     Date of Admit: 4/3/2025    Chief Complaint     No chief complaint on file.   for IV abx therapy for L shoulder osteo/septic arthritis    Subjective:      History of Present Illness:  Kimani Redd is a 59 y.o. male who has a PMH of CAD with ICMO (EF45%), carotid stenosis, HTN, HLD. The patient presented as transfer to [facility] from [facility] as direct in patient transfer on 4/3/2025 for IV abx therapy for L clavicular osteomyelitis and AC joint septic arthritis; patient is s/p I&D and excision of distal clavicle 3/25/2025. Patient initially also had blood cultures + for MSSA on admission to [facility], but repeat cultures were negative 2-3 days later. Patient had SHRUTHI without vegetations. ID at [facility] recommended 6 weeks IV abx with ancef, but patient is self pay, so was transferred to [facility] for abx therapy. Upon admission, patient without complaints, and feels overall well. The patient denies headaches, changes in vision, nausea, emesis, fevers, chills, chest pain, palpitations, dyspnea, abdominal pain, or changes in urinary or bowel habits.     Interval Hx  NAEON, patient states he is doing well, pain controlled. Would like to discuss pricing of OPAT. He denies any hx of illicut drug use or alcohol use. His sister works as a nurse and is able to care for his abx treatment. He is asking for approximate pricing of OPAT.     Past Medical History:  Past Medical History:   Diagnosis Date    Kidney stone     passed 2 weeks ago       Past Surgical History:  Past Surgical History:   Procedure Laterality Date    CAROTID STENT      INCISION AND DRAINAGE, UPPER EXTREMITY Left 3/25/2025    Procedure: INCISION AND DRAINAGE, UPPER EXTREMITY;  Surgeon: Ernesto Jefferson MD;  Location: [facility];  Service: Orthopedics;  Laterality: Left;  Irrigation and debridement left clavicle; left distal clavicle excision; supine, reverse on vascular bed, wash stuff, second case       Allergies:  Review of patient's  allergies indicates:  No Known Allergies    Home Medications:  Prior to Admission medications    Medication Sig Start Date End Date Taking? Authorizing Provider   aspirin (ECOTRIN) 81 MG EC tablet Take 81 mg by mouth once daily.    Provider, Historical   ceFAZolin 2 g/50mL Dextrose IVPB (ANCEF) 2 gram/50 mL PgBk Inject 50 mLs (2,000 mg total) into the vein every 8 (eight) hours. 4/3/25   Mirian Recio MD   fenofibrate 160 MG Tab Take 1 tablet (160 mg total) by mouth once daily. 3/11/25   Jessi Leyva FNP   gabapentin (NEURONTIN) 300 MG capsule Take 1 capsule (300 mg total) by mouth 3 (three) times daily. for 10 days 3/8/25 3/18/25  Azra Mclean MD   metoprolol succinate (TOPROL-XL) 25 MG 24 hr tablet Take 1 tablet (25 mg total) by mouth once daily. 4/4/25 4/4/26  Mirian Recio MD   cyclobenzaprine (FLEXERIL) 10 MG tablet Take 1 tablet (10 mg total) by mouth 3 (three) times daily as needed for Muscle spasms. 3/11/25 4/3/25  Jessi Leyva FNP   diclofenac (VOLTAREN) 50 MG EC tablet Take 1 tablet (50 mg total) by mouth 3 (three) times daily as needed (pain). 3/11/25 4/3/25  Jessi Leyva FNP   HYDROcodone-acetaminophen (NORCO) 7.5-325 mg per tablet Take 1 tablet by mouth every 6 (six) hours as needed for Pain. 3/11/25 4/3/25  Jessi Leyva FNP   LIDOcaine (LIDODERM) 5 % Place 1 patch onto the skin once daily. Remove & Discard patch within 12 hours or as directed by MD 3/6/25 4/3/25  Hudson Dougherty MD   metoprolol tartrate (LOPRESSOR) 25 MG tablet Take 0.5 tablets (12.5 mg total) by mouth once daily. 8/20/24 4/3/25  Jessi Leyva FNP   predniSONE (DELTASONE) 20 MG tablet Take 1 tablet (20 mg total) by mouth once daily. 3/11/25 4/3/25  Jessi Leyva FNP   simvastatin (ZOCOR) 40 MG tablet TAKE ONE TABLET BY MOUTH EVERY EVENING 10/30/23 4/3/25  Jessi Leyva FNP       Family History:  No family history on file.    Social History:  Social  History[1]    Review of Systems:  Review of Systems   Constitutional:  Negative for chills and fever.   Respiratory:  Negative for cough, sputum production and shortness of breath.    Cardiovascular:  Negative for chest pain and leg swelling.   Gastrointestinal:  Negative for abdominal pain, diarrhea, heartburn, nausea and vomiting.   Genitourinary:  Negative for dysuria.   Musculoskeletal:  Negative for myalgias and neck pain.   Neurological:  Negative for dizziness and weakness.              Objective:   Last 24 Hour Vital Signs:  Vitals  BP: 122/77  Temp: 98.2 °F (36.8 °C)  Temp Source: Oral  Pulse: 78  Resp: 17  SpO2: 98 %  Weight: 68.5 kg (151 lb)    Physical Examination:  General: Patient resting comfortably, in no acute distress   Eye: pupils equal, EOMI, clear conjunctiva, eyelids normal  HENT: Head-normocephalic and atraumatic  Neck: full range of motion, no thyromegaly or lymphadenopathy, trachea midline, supple  Respiratory: clear to auscultation bilaterally without wheezes, rales, rhonchi  Cardiovascular: regular rate and rhythm without murmurs.  No gallops or rubs, no JVD.  Capillary refill within normal limits.  Gastrointestinal: soft, non-tender, non-distended with normal bowel sounds, without masses to palpation  Genitourinary: no CVA tenderness to palpation  Musculoskeletal: full range of motion of all extremities/spine without limitation or discomfort; L shoulder with dressed surgical site; clean/dry/intact  Integumentary: no rashes or skin lesions present  Neurologic: no signs of peripheral neurological deficit, motor/sensory function intact  Psychiatric:  alert and oriented, cognitive function intact, cooperative with exam        Laboratory:  Most Recent Data:  Most Recent Data:  CBC:   Lab Results   Component Value Date    WBC 10.33 04/04/2025    HGB 14.6 04/04/2025    HCT 43.5 04/04/2025     (H) 04/04/2025    MCV 88.4 04/04/2025    RDW 11.8 04/04/2025     WBC Differential:   Recent Labs  "  Lab 04/03/25  0600 04/04/25  0554   WBC 11.76* 10.33   HGB 14.4 14.6   HCT 43.4 43.5   * 476*   MCV 89.1 88.4     BMP:   Lab Results   Component Value Date     04/04/2025    K 4.3 04/04/2025     04/04/2025    CO2 26 04/04/2025    BUN 15.5 04/04/2025    CREATININE 0.72 04/04/2025    CALCIUM 9.7 04/04/2025    MG 2.00 04/04/2025    PHOS 3.1 04/04/2025     LFTs:   Lab Results   Component Value Date    ALBUMIN 3.5 04/04/2025    BILITOT 0.4 04/04/2025    AST 16 04/04/2025    ALKPHOS 89 04/04/2025    ALT 11 04/04/2025     Coags:   Lab Results   Component Value Date    INR 1.01 (L) 07/03/2017     FLP:   Lab Results   Component Value Date    CHOL 255 (H) 09/07/2024    HDL 40 09/07/2024    TRIG 138 09/07/2024     DM:   Lab Results   Component Value Date    HGBA1C 4.8 09/07/2024    HGBA1C 5.0 09/02/2023    HGBA1C 5.1 08/09/2022    CREATININE 0.72 04/04/2025     Thyroid:   Lab Results   Component Value Date    TSH 1.168 09/07/2024     Anemia: No results found for: "IRON", "TIBC", "FERRITIN", "[id number]", "FOLATE"  Cardiac: No results found for: "TROPONINI", "CKTOTAL", "CKMB", "BNP"  Urinalysis:   Lab Results   Component Value Date    COLORU Yellow 06/24/2024    NITRITE Negative 06/24/2024    KETONESU Negative 07/17/2021    UROBILINOGEN 0.2 06/24/2024    WBCUA None Seen 06/24/2024       Radiology:           Assessment & Plan:   Left AC joint septic arthritis with osteomyelitis of the distal clavicle and acromion; s/p distal clavicle excision (3/25/2025)  MSSA bacteremia (resolved) likely secondary to left shoulder abscess  Left shoulder abscess status post I and D - 03/15  - patient was transferred to [facility] for orthopedic services, had L shoulder I&D, and found to have MSSA in tissue culture and blood cultures; (repeat blood cultures negative on 3/17/2025)  - MRI showed evidence of osteo of L clavicle and AC joint septic arthritis  - had excision of L distal clavicle 03/25/2025  - ID recommended 6 weeks IV " abx therapy with ancef 2 mg q8h, with end date May 2nd.  - transferred to [facility] due to insurance issues  - will start on Ancef 2mg q8h  - abx end date will be May 2nd, 2025  - patient presented with PICC, so will get blood cultures  - will start oxycodone 5 mg q4h; will start miralax daily for bowel ppx  - ID consulted for OPAT     CAD  ICMO, EF 45%, RMWA  - will start home ASA 81 mg daily  - will start home toprol 25 mg daily    Insomnia  - started home benadryl 25 mg daily      CODE STATUS: Full Code  Access: Peripheral  Antibiotics: Ancef  Diet: Cardiac  DVT Prophylaxis: Lovenox  GI Prophylaxis: none needed  Fluids: none    Dispo: Patient being treated for L clavicular osteo; IV abx for 6 weeks, pending OPAT option    Refugio Holley MD  Family Medicine, [facility]            [1]   Social History  Tobacco Use    Smoking status: Every Day     Current packs/day: 0.50     Types: Cigarettes    Smokeless tobacco: Never   Substance Use Topics    Alcohol use: Never    Drug use: Never

## 2025-04-04 NOTE — CARE UPDATE
[facility]  Infectious Diseases OPAT Orders  2025     PATIENT: Kimani Redd  :          1965   MRN:         [id number]     DIAGNOSIS:  Left clavicular osteomyelitis in the setting of MSSA bacteremia with a left shoulder abscess and septic arthritis    Review of patient's allergies indicates:  No Known Allergies     WEIGHT: 68.5 kg  CURRENT: Estimated Creatinine Clearance: 98.5 mL/min (based on SCr of 0.72 mg/dL).     MEDICATIONS:   1)  Ancef 2 grams IV q 8 hours to complete a 42 day course   - End date: 2025          **PLEASE FAX LAB RESULTS TO [phone]**  LABS:     Please check the following labs weekly x 5 weeks: CBC, CMP, CRP, and ESR    ** LABS are NOT to be drawn from PICC site**      NURSING / OTHER:     [] Please place PICC line  [x] Routine PICC line care with weekly PICC line dressing changes  [] Patient to receive first dose of IV antimicrobials in a supervised setting  [x] Please provide home IV antimicrobial teaching  [x] OK to pull PICC line after completion of IV antimicrobial therapy    ADDITIONAL NOTES:       Cecelia Tee FNP / Dr Pb Carvalho  [facility] Infectious Diseases

## 2025-04-04 NOTE — NURSING
Assumed care of pt at 1930, Bedding informed nursing of an available bed at [facility] for pt transfer. Report was called to Nurse at [facility] at 2005, all questions were answered. Pt was informed of room number and transfer in progress. 2030 Abx was given, abx was completed and PICC was saline locked. [facility] arrived to get pt at 2150, [facility] and all questions were answered. Pt departed at 2200 and [facility] was notified of patient depart.

## 2025-04-04 NOTE — CONSULTS
[facility] and [facility]   Inpatient Infectious Diseases Consultation    Physician requesting consultation: Luba Storm MD  Service requesting consultation: Internal Medicine  Reason for consultation:  Osteomyelitis / septic arthritis     Historian: Patient, Electronic Medical Record, and Family Member/Spouse    Isolation Status: No active isolations     HPI:     Mr Redd is a 59 yr old WM with past medical history that is significant for hypertension, hyperlipidemia, coronary artery disease, and carotid stenosis who was transferred in from [facility] for continuation of IV antibiotics as he currently has no health care insurance.  Patient had a left shoulder abscess and was found to be bacteremic with MSSA.  Patient underwent a bedside I&D of his left shoulder on 03/15/2025 which showed growth of MSSA also.  Blood cultures were shown to clear as of 03/17/2025.  SHRUTHI was performed on 03/20/2025 showed no concerns for valvular vegetations.  PICC was placed on 03/202/2025 which is after clearance of blood cultures.  MRI of left shoulder was done on 03/21/2025 and showed left shoulder AC joint septic arthritis with osteomyelitis of the distal clavicle and acromion.  Patient was seen by orthopedic surgery and underwent further left clavicle debridement in which purulence was encountered and the left shoulder abscess was further addressed on 03/25/2025.  Intraoperative cultures once again showed MSSA.  Patient was transitioned to antibiotic therapy with Cefazolin on 03/17/2025.  He currently has plans for 42 days of IV antibiotics with Ancef in which originally end date was planned for 05/02/2025, but will extend therapy until 05/05/2025 starting from intraoperative source control on 03/25/2025 as additional purulence was encountered at this time and addressed.  ID has been consulted for antibiotic recommendations for osteomyelitis.  Upon arrival, patient was found to be afebrile with leukocytosis at 11.7  which now has now resolved.  Blood cultures x2 have been repeated and remain pending.  Routine screening for hepatitis/gonorrhea/chlamydia are all negative.  Upon exam, patient has a dressing to his left shoulder and reports pain.  He is also noted with a PICC to the right upper arm which is currently dislodged and we will need to be replaced.  Patient lives with wife in [place], [place].  Reports he works in construction.  Denies tobacco, alcohol, or illicit drug use to include IVDU.      Antibiotic / Antiviral / Antifungal history:  Vancomycin IV trough 15-20 - 03/14/2025 to 03/17/2025  Zosyn 4.5 grams IV q 8 hours - 03/14/2025 to 03/16/2025  Cefazolin 2 g IV q.8 hours - 03/17/2025 to present  Vancomycin IV x1 dose - 03/25/2025         Past Medical History/Past Surgical History/Social History     Past Medical History:   Diagnosis Date    Kidney stone     passed 2 weeks ago       Past Surgical History:   Procedure Laterality Date    CAROTID STENT      INCISION AND DRAINAGE, UPPER EXTREMITY Left 3/25/2025    Procedure: INCISION AND DRAINAGE, UPPER EXTREMITY;  Surgeon: Ernesto Jefferson MD;  [facility]: [facility];  Service: Orthopedics;  Laterality: Left;  Irrigation and debridement left clavicle; left distal clavicle excision; supine, reverse on vascular bed, wash stuff, second case        FAMILY HISTORY:  family history is not on file.     SOCIAL HISTORY:   Social History     Socioeconomic History    Marital status:    Occupational History     Comment: employed   Tobacco Use    Smoking status: Every Day     Current packs/day: 0.50     Types: Cigarettes    Smokeless tobacco: Never   Substance and Sexual Activity    Alcohol use: Never    Drug use: Never    Sexual activity: Not Currently   Social History Narrative    ** Merged History Encounter **          Social Drivers of Health     Financial Resource Strain: High Risk (4/4/2025)    Overall Financial Resource Strain (CARDIA)     Difficulty of Paying  Living Expenses: Very hard   Food Insecurity: No Food Insecurity (4/4/2025)    Hunger Vital Sign     Worried About Running Out of Food in the Last Year: Never true     Ran Out of Food in the Last Year: Never true   Transportation Needs: No Transportation Needs (4/4/2025)    PRAPARE - Transportation     Lack of Transportation (Medical): No     Lack of Transportation (Non-Medical): No   Stress: No Stress Concern Present (4/4/2025)    [employer] - Occupational Stress Questionnaire     Feeling of Stress : Only a little   Housing Stability: Low Risk  (4/4/2025)    Housing Stability Vital Sign     Unable to Pay for Housing in the Last Year: No     Number of Times Moved in the Last Year: 0     Homeless in the Last Year: No        ALLERGIES: Review of patient's allergies indicates:  No Known Allergies      Review of Systems     Review of Systems   Constitutional:  Negative for chills, diaphoresis, fever and malaise/fatigue.   HENT:  Negative for congestion, ear pain, hearing loss and sore throat.    Eyes:  Negative for blurred vision, photophobia and pain.   Respiratory:  Negative for cough, hemoptysis, sputum production and shortness of breath.    Cardiovascular:  Negative for chest pain, palpitations and leg swelling.   Gastrointestinal:  Negative for abdominal pain, constipation, diarrhea, nausea and vomiting.   Genitourinary:  Negative for dysuria, flank pain, frequency, hematuria and urgency.   Musculoskeletal:  Positive for joint pain (LT shoulder pain). Negative for back pain, myalgias and neck pain.   Skin:  Negative for itching and rash.   Neurological:  Negative for dizziness, weakness and headaches.   Endo/Heme/Allergies:  Does not bruise/bleed easily.   Psychiatric/Behavioral:  Negative for depression and hallucinations.          MEDICATIONS:   Scheduled Meds:   aspirin  81 mg Oral Daily    ceFAZolin  2 g Injection Q8H    enoxparin  40 mg Subcutaneous Daily    metoprolol succinate   25 mg Oral Daily    nicotine  1 patch Transdermal Daily    polyethylene glycol  17 g Oral Daily     Continuous Infusions:  PRN Meds:.  Current Facility-Administered Medications:     acetaminophen, 650 mg, Oral, Q6H PRN    diphenhydrAMINE, 25 mg, Oral, Nightly PRN    hydrOXYzine pamoate, 50 mg, Oral, Q6H PRN    oxyCODONE, 5 mg, Oral, Q4H PRN    Flushing PICC/Midline Protocol, , , Until Discontinued **AND** sodium chloride 0.9%, 10 mL, Intravenous, Q12H PRN    Physical exam:     Temp:  [97.8 °F (36.6 °C)-98.2 °F (36.8 °C)] 98.2 °F (36.8 °C)  Pulse:  [75-80] 78  Resp:  [17-20] 17  SpO2:  [96 %-98 %] 98 %  BP: (108-126)/(69-81) 122/77     GENERAL: Middle age WM who is A&Ox3, NAD, does not appear overtly ill  HEENT: atraumatic, normocephalic, anicteric, moist oral mucosa without lesions, exudate, or erythema; no thrush  LUNGS: breathing unlabored, lungs CTA bilateral  HEART: RRR; + murmur, no rub or gallop  ABDOMEN: abdomen soft, nondistended, nontender to palpation  : no CVA tenderness  EXTREMITIES:  Dressing to left shoulder  SKIN: no obvious rashes or lesions  NEURO: speech fluent and intact, facial symmetry preserved, no tremor  PSYCH: cooperative, normal mood and affect  LINES: PICC to RT upper am which is pulled out further than recommended        LABS:     I have personally reviewed patient's labs.  Pertinent results noted below.    CBC  Recent Labs     04/03/25  0600 04/04/25  0554   WBC 11.76* 10.33   HGB 14.4 14.6   HCT 43.4 43.5   * 476*       Differential  Recent Labs   Lab 04/04/25  0554   WBC 10.33   HGB 14.6   HCT 43.5   *        Basic Metabolic Panel  Recent Labs     04/03/25  0600 04/04/25  0554    139   K 4.7 4.3    106   CO2 25 26   BUN 17.3 15.5   CREATININE 0.78 0.72        Hepatic Panel  Lab Results   Component Value Date    ALT 11 04/04/2025    AST 16 04/04/2025    ALKPHOS 89 04/04/2025    BILITOT 0.4 04/04/2025        Urinalysis:  Results for orders placed or performed  during the hospital encounter of 06/24/24   Urinalysis, Reflex to Urine Culture    Specimen: Urine, Clean Catch   Result Value Ref Range    Color, UA Yellow Yellow, Light-Yellow, Dark Yellow, Hayde, Straw    Appearance, UA Clear Clear    Specific Gravity, UA 1.025 1.005 - 1.030    pH, UA 6.0 5.0 - 8.5    Protein, UA Negative Negative    Glucose, UA Negative Negative, Normal    Ketones, UA Negative Negative    Blood, UA Negative Negative    Bilirubin, UA Negative Negative    Urobilinogen, UA 0.2 0.2, 1.0, Normal    Nitrites, UA Negative Negative    Leukocyte Esterase, UA Negative Negative       Estimated Creatinine Clearance: 98.5 mL/min (based on SCr of 0.72 mg/dL).     ESR:  Results for orders placed or performed during the hospital encounter of 03/14/25   Sedimentation Rate   Result Value Ref Range    Sed Rate 56 (H) 0 - 15 mm/hr      CRP:  Results for orders placed or performed during the hospital encounter of 03/14/25   C-reactive protein   Result Value Ref Range    .40 (H) <5.00 mg/L         MICRO AND PATHOLOGY:   Colonization:  Results for orders placed or performed during the hospital encounter of 03/15/25   MRSA PCR   Result Value Ref Range    MRSA PCR Screen Not Detected Not Detected    Narrative    The Xpert MRSA Assay utilizes automated real-time polymerase chain reaction (PCR) to detect MRSA DNA.  A positive test result does not necessarily indicate the presence of viable organism.  It is however, presumptive for the presence of MRSA.     03/14/2025 blood cultures x2 with MSSA (2/2 sets)  03/15/2025 left shoulder abscess with MSSA   03/17/2025 blood cultures x2 NGTD   03/25/2025 left tissue clavicle intraoperative culture with MSSA   03/25/2025 left fungal tissue clavicle intraoperative culture pending   04/03/2025 blood cultures x2 pending       IMAGING:     I have personally reviewed patient's imaging. Pertinent results noted below.  X-Ray Chest 1 View for Line/Tube Placement    (Results  Pending)       SHRUTHI 03/20/2025:   Echocardiography Findings    Left Ventricle The left ventricle is normal in size. Normal wall thickness. Normal wall motion. There is normal systolic function with a visually estimated ejection fraction of 55 - 60%.   Right Ventricle The right ventricle is normal in size. Wall thickness is normal. Systolic function is normal.   Left Atrium Normal left atrial size. The left atrial appendage appears normal. Appendage velocity is normal at greater than 40 cm/sec. There is no thrombus in the left atrial cavity.   Right Atrium Normal right atrial size.   Aortic Valve The aortic valve is structurally normal. Mildly calcified noncoronary cusp. There is normal leaflet mobility. There is no mass/vegetation present. There is no significant regurgitation.   Mitral Valve The mitral valve is structurally normal. There is normal leaflet mobility. There is no mass/vegetation present. There is no stenosis. There is trace regurgitation.   Tricuspid Valve The tricuspid valve is structurally normal. There is no mass/vegetation present. There is normal leaflet mobility. There is no stenosis. There is trace regurgitation.   Pulmonic Valve The pulmonic valve is structurally normal.   Ascending Aorta Ascending aorta is normal. Descending aorta is normal. Grade 2 atherosclerosis. There is no mass present.   Pericardium and Other Findings There is no pericardial effusion.       MRI left shoulder with and without contrast 03/21/2025 :   Impression:  AC joint septic arthritis with osteomyelitis of the distal clavicle and acromion.        IMPRESSION     MSSA bacteremia  Left shoulder abscess with septic arthritis   Bedside I&D 03/15/2025  Left clavicular osteomyelitis   S/P left distal clavicle debridement with I&D of left shoulder abscess 03/25/2025  Dislodged PICC line   Heart murmur   Leukocytosis - resolved       RECOMMENDATIONS:      Follow up repeat blood cultures  Patient noted with dislodged PICC line.   Patient will need current PICC line pulled and replaced.  Once PICC is replaced, then can resume antibiotic therapy  Continue Cefazolin 2 g IV q.8 hours for osteomyelitis.  Will extend therapy until 05/05/2025 as using starting date 03/25/2025 from intraoperative source control date.  Patient is noted with no current insurance coverage.  He would like to see cost of treatment if he would elect for OPAT at home.  Will write OPAT orders for pricing.  Case management to assist with this.   Follow up results of routine STI screening to include: HIV, syphilis   Thank you for the consult. We will follow along with you. Please do not hesitate to call with any questions or concerns.         BRUCE Benjamin  [facility] Infectious Diseases     *Portions of this note dictated using EMR integrated voice recognition software, and may be subject to voice recognition errors not corrected at proofreading. Please contact writer for clarification if needed. *

## 2025-04-04 NOTE — H&P
U Internal Medicine History and Physical     Date of Admit: 4/3/2025    Chief Complaint     No chief complaint on file.   for IV abx therapy for L shoulder osteo/septic arthritis    Subjective:      History of Present Illness:  Kimani Redd is a 59 y.o. male who has a PMH of CAD with ICMO (EF45%), carotid stenosis, HTN, HLD. The patient presented as transfer to [facility] from [facility] as direct in patient transfer on 4/3/2025 for IV abx therapy for L clavicular osteomyelitis and AC joint septic arthritis; patient is s/p I&D and excision of distal clavicle 3/25/2025. Patient initially also had blood cultures + for MSSA on admission to [facility], but repeat cultures were negative 2-3 days later. Patient had SHRUTHI without vegetations. ID at [facility] recommended 6 weeks IV abx with ancef, but patient is self pay, so was transferred to [facility] for abx therapy. Upon admission, patient without complaints, and feels overall well. The patient denies headaches, changes in vision, nausea, emesis, fevers, chills, chest pain, palpitations, dyspnea, abdominal pain, or changes in urinary or bowel habits.       Past Medical History:  Past Medical History:   Diagnosis Date    Kidney stone     passed 2 weeks ago       Past Surgical History:  Past Surgical History:   Procedure Laterality Date    CAROTID STENT      INCISION AND DRAINAGE, UPPER EXTREMITY Left 3/25/2025    Procedure: INCISION AND DRAINAGE, UPPER EXTREMITY;  Surgeon: Ernesto Jefferson MD;  [facility];  Service: Orthopedics;  Laterality: Left;  Irrigation and debridement left clavicle; left distal clavicle excision; supine, reverse on vascular bed, wash stuff, second case       Allergies:  Review of patient's allergies indicates:  No Known Allergies    Home Medications:  Prior to Admission medications    Medication Sig Start Date End Date Taking? Authorizing Provider   aspirin (ECOTRIN) 81 MG EC tablet Take 81 mg by mouth once daily.    Provider, Historical   ceFAZolin 2 g/50mL Dextrose  IVPB (ANCEF) 2 gram/50 mL PgBk Inject 50 mLs (2,000 mg total) into the vein every 8 (eight) hours. 4/3/25   Mirian Recio MD   fenofibrate 160 MG Tab Take 1 tablet (160 mg total) by mouth once daily. 3/11/25   Jessi Leyva FNP   gabapentin (NEURONTIN) 300 MG capsule Take 1 capsule (300 mg total) by mouth 3 (three) times daily. for 10 days 3/8/25 3/18/25  Azra Mclean MD   metoprolol succinate (TOPROL-XL) 25 MG 24 hr tablet Take 1 tablet (25 mg total) by mouth once daily. 4/4/25 4/4/26  Mirian Recio MD   cyclobenzaprine (FLEXERIL) 10 MG tablet Take 1 tablet (10 mg total) by mouth 3 (three) times daily as needed for Muscle spasms. 3/11/25 4/3/25  Jessi Leyva FNP   diclofenac (VOLTAREN) 50 MG EC tablet Take 1 tablet (50 mg total) by mouth 3 (three) times daily as needed (pain). 3/11/25 4/3/25  Jessi Leyva FNP   HYDROcodone-acetaminophen (NORCO) 7.5-325 mg per tablet Take 1 tablet by mouth every 6 (six) hours as needed for Pain. 3/11/25 4/3/25  Jessi Leyva FNP   LIDOcaine (LIDODERM) 5 % Place 1 patch onto the skin once daily. Remove & Discard patch within 12 hours or as directed by MD 3/6/25 4/3/25  Hudson Dougherty MD   metoprolol tartrate (LOPRESSOR) 25 MG tablet Take 0.5 tablets (12.5 mg total) by mouth once daily. 8/20/24 4/3/25  Jessi Leyva FNP   predniSONE (DELTASONE) 20 MG tablet Take 1 tablet (20 mg total) by mouth once daily. 3/11/25 4/3/25  Jessi Leyva FNP   simvastatin (ZOCOR) 40 MG tablet TAKE ONE TABLET BY MOUTH EVERY EVENING 10/30/23 4/3/25  Carmouche, Jessi D, FNP       Family History:  No family history on file.    Social History:  Social History[1]    Review of Systems:  Review of Systems   Constitutional:  Negative for chills and fever.   Respiratory:  Negative for cough, sputum production and shortness of breath.    Cardiovascular:  Negative for chest pain and leg swelling.   Gastrointestinal:  Negative for abdominal pain,  diarrhea, heartburn, nausea and vomiting.   Genitourinary:  Negative for dysuria.   Musculoskeletal:  Negative for myalgias and neck pain.   Neurological:  Negative for dizziness and weakness.              Objective:   Last 24 Hour Vital Signs:  Vitals  Weight: 68.5 kg (151 lb)    Physical Examination:  General: Patient resting comfortably, in no acute distress   Eye: pupils equal, EOMI, clear conjunctiva, eyelids normal  HENT: Head-normocephalic and atraumatic  Neck: full range of motion, no thyromegaly or lymphadenopathy, trachea midline, supple  Respiratory: clear to auscultation bilaterally without wheezes, rales, rhonchi  Cardiovascular: regular rate and rhythm without murmurs.  No gallops or rubs, no JVD.  Capillary refill within normal limits.  Gastrointestinal: soft, non-tender, non-distended with normal bowel sounds, without masses to palpation  Genitourinary: no CVA tenderness to palpation  Musculoskeletal: full range of motion of all extremities/spine without limitation or discomfort; L shoulder with dressed surgical site; clean/dry/intact  Integumentary: no rashes or skin lesions present  Neurologic: no signs of peripheral neurological deficit, motor/sensory function intact  Psychiatric:  alert and oriented, cognitive function intact, cooperative with exam        Laboratory:  Most Recent Data:  Most Recent Data:  CBC:   Lab Results   Component Value Date    WBC 11.76 (H) 04/03/2025    HGB 14.4 04/03/2025    HCT 43.4 04/03/2025     (H) 04/03/2025    MCV 89.1 04/03/2025    RDW 12.0 04/03/2025     WBC Differential:   Recent Labs   Lab 04/03/25  0600   WBC 11.76*   HGB 14.4   HCT 43.4   *   MCV 89.1     BMP:   Lab Results   Component Value Date     04/03/2025    K 4.7 04/03/2025     04/03/2025    CO2 25 04/03/2025    BUN 17.3 04/03/2025    CREATININE 0.78 04/03/2025    CALCIUM 9.4 04/03/2025    MG 2.00 03/21/2025     LFTs:   Lab Results   Component Value Date    ALBUMIN 2.7 (L)  "03/21/2025    BILITOT 0.3 03/21/2025    AST 14 03/21/2025    ALKPHOS 121 03/21/2025    ALT 16 03/21/2025     Coags:   Lab Results   Component Value Date    INR 1.01 (L) 07/03/2017     FLP:   Lab Results   Component Value Date    CHOL 255 (H) 09/07/2024    HDL 40 09/07/2024    TRIG 138 09/07/2024     DM:   Lab Results   Component Value Date    HGBA1C 4.8 09/07/2024    HGBA1C 5.0 09/02/2023    HGBA1C 5.1 08/09/2022    CREATININE 0.78 04/03/2025     Thyroid:   Lab Results   Component Value Date    TSH 1.168 09/07/2024     Anemia: No results found for: "IRON", "TIBC", "FERRITIN", "[id number]", "FOLATE"  Cardiac: No results found for: "TROPONINI", "CKTOTAL", "CKMB", "BNP"  Urinalysis:   Lab Results   Component Value Date    COLORU Yellow 06/24/2024    NITRITE Negative 06/24/2024    KETONESU Negative 07/17/2021    UROBILINOGEN 0.2 06/24/2024    WBCUA None Seen 06/24/2024       Radiology:           Assessment & Plan:   Left AC joint septic arthritis with osteomyelitis of the distal clavicle and acromion; s/p distal clavicle excision (3/25/2025)  MSSA bacteremia (resolved) likely secondary to left shoulder abscess  Left shoulder abscess status post I and D - 03/15  - patient was transferred to [facility] for orthopedic services, had L shoulder I&D, and found to have MSSA in tissue culture and blood cultures; (repeat blood cultures negative on 3/17/2025)  - MRI showed evidence of osteo of L clavicle and AC joint septic arthritis  - had excision of L distal clavicle 03/25/2025  - ID recommended 6 weeks IV abx therapy with ancef 2 mg q8h, with end date May 2nd.  - transferred to [facility] due to insurance issues  - will start on Ancef 2mg q8h  - abx end date will be May 2nd, 2025  - patient presented with PICC, so will get blood cultures  - will start oxycodone 5 mg q4h; will start miralax daily for bowel ppx    CAD  ICMO, EF 45%, RMWA  - will start home ASA 81 mg daily  - will start home toprol 25 mg daily    Insomnia  - started home " benadryl 25 mg daily      CODE STATUS: Full Code  Access: Peripheral  Antibiotics: Ancef  Diet: Cardiac  DVT Prophylaxis: Lovenox  GI Prophylaxis: none needed  Fluids: none    Dispo: Patient being treated for L clavicular osteo; IV abx for 6 weeks, self pay      Britney Ashley MD  [facility] Internal Medicine  HO-1         [1]   Social History  Tobacco Use    Smoking status: Every Day     Current packs/day: 0.50     Types: Cigarettes    Smokeless tobacco: Never   Substance Use Topics    Alcohol use: Never    Drug use: Never

## 2025-04-04 NOTE — PROGRESS NOTES
Inpatient Nutrition Evaluation    Admit Date: 4/3/2025   Total duration of encounter: 1 day   Patient Age: 59 y.o.    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 s/p distal clavicle excision 3/25, MSSA Bacteremia, Left shoulder abscess s/p I&D on 3/15, CAD    Relevant Medical History: CAD, Carotid stenosis, HTN, HLD, Kidney stones    Scheduled Medications:  aspirin, 81 mg, Daily  ceFAZolin, 2 g, Q8H  enoxparin, 40 mg, Daily  metoprolol succinate, 25 mg, Daily  polyethylene glycol, 17 g, Daily    Continuous Infusions:   PRN Medications:   Current Facility-Administered Medications:     acetaminophen, 650 mg, Oral, Q6H PRN    diphenhydrAMINE, 25 mg, Oral, Nightly PRN    hydrOXYzine pamoate, 50 mg, Oral, Q6H PRN    oxyCODONE, 5 mg, Oral, Q4H PRN    Recent Labs   Lab 04/03/25  0600 04/04/25  0554    139   K 4.7 4.3   CALCIUM 9.4 9.7   PHOS  --  3.1   MG  --  2.00   CO2 25 26   BUN 17.3 15.5   CREATININE 0.78 0.72   EGFRNORACEVR >60 >60   GLUCOSE 93 93   BILITOT  --  0.4   ALKPHOS  --  89   ALT  --  11   AST  --  16   ALBUMIN  --  3.5   WBC 11.76* 10.33   HGB 14.4 14.6   HCT 43.4 43.5     Nutrition Orders:  Diet Heart Healthy      Appetite/Oral Intake: good/% of meals  Factors Affecting Nutritional Intake: none identified  Social Needs Impacting Access to Food: none identified  Food/Gnosticist/Cultural Preferences: none reported  Food Allergies: no known food allergies  Last Bowel Movement: 04/02/25  Wound(s):  left shoulder incision     Comments    4/4/25 -- Pt with good appetite, denies difficulty eating, 100% of meal  "intake documented; no GI symptoms; denies weight loss reporting UBW as 150 - 155 lb, reporting 160 - 165 lb documented weight was with his steel toe boots, will continue to monitor     Anthropometrics  Ht 65"   ,    Last Weight: 68.5 kg (151 lb) (25),    BMI (Calculated): 25.1  BMI Classification: overweight (BMI 25-29.9)                                Usual Body Weight (UBW), k kg (150-155 lb)  % Usual Body Weight: 99.47     Usual Weight Provided By: patient and EMR weight history    Wt Readings from Last 5 Encounters:   25 68.5 kg (151 lb)   25 74.8 kg (165 lb)   25 74.8 kg (165 lb)   25 72.9 kg (160 lb 11.2 oz)   25 74.8 kg (165 lb)     Weight Change(s) Since Admission: new admit  Wt Readings from Last 1 Encounters:   25 68.5 kg (151 lb)   Admit Weight: 68.5 kg (151 lb) (25),      Patient Education     Not applicable.    Nutrition Goals & Monitoring     Dietitian will monitor: food and beverage intake and weight  Discharge planning: continue heart healthy diet  Nutrition Risk/Follow-Up: low (follow-up in 5-7 days)  Patients assigned 'low nutrition risk' status do not qualify for a full nutritional assessment but will be monitored and re-evaluated in a 5-7 day time period. Please consult if re-evaluation needed sooner.   "

## 2025-04-04 NOTE — PROCEDURES
Fabiola Redd is a 59 y.o. male patient.    Temp: 98.2 °F (36.8 °C) (04/04/25 1131)  Pulse: 78 (04/04/25 1131)  Resp: 17 (04/04/25 1042)  BP: 122/77 (04/04/25 1131)  SpO2: 98 % (04/04/25 1131)  Weight: 68.5 kg (151 lb) (04/03/25 2232)    PICC  Date/Time: 4/4/2025 2:06 PM  Performed by: Jessica Tavarez, TEGAN  Consent Done: Yes  Time out: Immediately prior to procedure a time out was called to verify the correct patient, procedure, equipment, support staff and site/side marked as required  Indications: vascular access  Preparation: skin prepped with ChloraPrep  Skin prep agent dried: skin prep agent completely dried prior to procedure  Sterile barriers: all five maximum sterile barriers used - cap, mask, sterile gown, sterile gloves, and large sterile sheet  Hand hygiene: hand hygiene performed prior to central venous catheter insertion  Location details: right basilic  Catheter type: double lumen  Catheter size: 5 Fr  Catheter Length: 38cm    Ultrasound guidance: yes  Vessel Caliber: medium and patent, compressibility normal  Number of attempts: 1  Post-procedure: blood return through all ports, sterile dressing applied and chlorhexidine patch    Assessment: placement verified by x-ray  Complications: none  Comments: Arm circumference 29cm. Right upper arm picc noted to be out 20cm. Exchange done at this time. Site presents with no swelling redness or drainage. Picc removed easily measuring 39cm. No complications.           Name LUCRETIA Tavarez RN  4/4/2025

## 2025-04-05 LAB
ALBUMIN SERPL-MCNC: 3.4 G/DL (ref 3.5–5)
ALBUMIN/GLOB SERPL: 0.8 RATIO (ref 1.1–2)
ALP SERPL-CCNC: 89 UNIT/L (ref 40–150)
ALT SERPL-CCNC: 12 UNIT/L (ref 0–55)
ANION GAP SERPL CALC-SCNC: 7 MEQ/L
AST SERPL-CCNC: 15 UNIT/L (ref 11–45)
BASOPHILS # BLD AUTO: 0.05 X10(3)/MCL
BASOPHILS NFR BLD AUTO: 0.5 %
BILIRUB SERPL-MCNC: 0.3 MG/DL
BUN SERPL-MCNC: 15.7 MG/DL (ref 8.4–25.7)
CALCIUM SERPL-MCNC: 9.7 MG/DL (ref 8.4–10.2)
CHLORIDE SERPL-SCNC: 105 MMOL/L (ref 98–107)
CO2 SERPL-SCNC: 26 MMOL/L (ref 22–29)
CREAT SERPL-MCNC: 0.69 MG/DL (ref 0.72–1.25)
CREAT/UREA NIT SERPL: 23
CRP SERPL-MCNC: 11.5 MG/L
EOSINOPHIL # BLD AUTO: 0.42 X10(3)/MCL (ref 0–0.9)
EOSINOPHIL NFR BLD AUTO: 4.3 %
ERYTHROCYTE [DISTWIDTH] IN BLOOD BY AUTOMATED COUNT: 11.9 % (ref 11.5–17)
ERYTHROCYTE [SEDIMENTATION RATE] IN BLOOD: 40 MM/HR (ref 0–15)
GFR SERPLBLD CREATININE-BSD FMLA CKD-EPI: >60 ML/MIN/1.73/M2
GLOBULIN SER-MCNC: 4.1 GM/DL (ref 2.4–3.5)
GLUCOSE SERPL-MCNC: 97 MG/DL (ref 74–100)
HAV AB SER QL IA: NONREACTIVE
HCT VFR BLD AUTO: 44.8 % (ref 42–52)
HGB BLD-MCNC: 14.8 G/DL (ref 14–18)
HIV 1+2 AB+HIV1 P24 AG SERPL QL IA: NONREACTIVE
IMM GRANULOCYTES # BLD AUTO: 0.04 X10(3)/MCL (ref 0–0.04)
IMM GRANULOCYTES NFR BLD AUTO: 0.4 %
LYMPHOCYTES # BLD AUTO: 3.13 X10(3)/MCL (ref 0.6–4.6)
LYMPHOCYTES NFR BLD AUTO: 31.7 %
MAGNESIUM SERPL-MCNC: 2.1 MG/DL (ref 1.6–2.6)
MCH RBC QN AUTO: 29.2 PG (ref 27–31)
MCHC RBC AUTO-ENTMCNC: 33 G/DL (ref 33–36)
MCV RBC AUTO: 88.4 FL (ref 80–94)
MONOCYTES # BLD AUTO: 1.02 X10(3)/MCL (ref 0.1–1.3)
MONOCYTES NFR BLD AUTO: 10.3 %
NEUTROPHILS # BLD AUTO: 5.2 X10(3)/MCL (ref 2.1–9.2)
NEUTROPHILS NFR BLD AUTO: 52.8 %
NRBC BLD AUTO-RTO: 0 %
PHOSPHATE SERPL-MCNC: 3.2 MG/DL (ref 2.3–4.7)
PLATELET # BLD AUTO: 440 X10(3)/MCL (ref 130–400)
PMV BLD AUTO: 9.4 FL (ref 7.4–10.4)
POTASSIUM SERPL-SCNC: 4.6 MMOL/L (ref 3.5–5.1)
PROT SERPL-MCNC: 7.5 GM/DL (ref 6.4–8.3)
RBC # BLD AUTO: 5.07 X10(6)/MCL (ref 4.7–6.1)
SODIUM SERPL-SCNC: 138 MMOL/L (ref 136–145)
T PALLIDUM AB SER QL: NONREACTIVE
WBC # BLD AUTO: 9.86 X10(3)/MCL (ref 4.5–11.5)

## 2025-04-05 PROCEDURE — 80053 COMPREHEN METABOLIC PANEL: CPT

## 2025-04-05 PROCEDURE — 86140 C-REACTIVE PROTEIN: CPT | Performed by: NURSE PRACTITIONER

## 2025-04-05 PROCEDURE — 83735 ASSAY OF MAGNESIUM: CPT

## 2025-04-05 PROCEDURE — 86780 TREPONEMA PALLIDUM: CPT | Performed by: NURSE PRACTITIONER

## 2025-04-05 PROCEDURE — 25000003 PHARM REV CODE 250

## 2025-04-05 PROCEDURE — 86708 HEPATITIS A ANTIBODY: CPT | Performed by: NURSE PRACTITIONER

## 2025-04-05 PROCEDURE — S4991 NICOTINE PATCH NONLEGEND: HCPCS

## 2025-04-05 PROCEDURE — 11000001 HC ACUTE MED/SURG PRIVATE ROOM

## 2025-04-05 PROCEDURE — 85025 COMPLETE CBC W/AUTO DIFF WBC: CPT

## 2025-04-05 PROCEDURE — 63600175 PHARM REV CODE 636 W HCPCS

## 2025-04-05 PROCEDURE — 85652 RBC SED RATE AUTOMATED: CPT | Performed by: NURSE PRACTITIONER

## 2025-04-05 PROCEDURE — 84100 ASSAY OF PHOSPHORUS: CPT

## 2025-04-05 PROCEDURE — 87389 HIV-1 AG W/HIV-1&-2 AB AG IA: CPT | Performed by: NURSE PRACTITIONER

## 2025-04-05 PROCEDURE — 36415 COLL VENOUS BLD VENIPUNCTURE: CPT | Performed by: NURSE PRACTITIONER

## 2025-04-05 RX ADMIN — OXYCODONE HYDROCHLORIDE 5 MG: 5 TABLET ORAL at 05:04

## 2025-04-05 RX ADMIN — ASPIRIN 81 MG: 81 TABLET, COATED ORAL at 08:04

## 2025-04-05 RX ADMIN — DIPHENHYDRAMINE HYDROCHLORIDE 25 MG: 25 CAPSULE ORAL at 08:04

## 2025-04-05 RX ADMIN — HYDROXYZINE PAMOATE 50 MG: 25 CAPSULE ORAL at 05:04

## 2025-04-05 RX ADMIN — CEFAZOLIN 2 G: 2 INJECTION, POWDER, FOR SOLUTION INTRAMUSCULAR; INTRAVENOUS at 03:04

## 2025-04-05 RX ADMIN — HYDROXYZINE PAMOATE 50 MG: 25 CAPSULE ORAL at 12:04

## 2025-04-05 RX ADMIN — OXYCODONE HYDROCHLORIDE 5 MG: 5 TABLET ORAL at 07:04

## 2025-04-05 RX ADMIN — OXYCODONE HYDROCHLORIDE 5 MG: 5 TABLET ORAL at 02:04

## 2025-04-05 RX ADMIN — ENOXAPARIN SODIUM 40 MG: 40 INJECTION SUBCUTANEOUS at 06:04

## 2025-04-05 RX ADMIN — CEFAZOLIN 2 G: 2 INJECTION, POWDER, FOR SOLUTION INTRAMUSCULAR; INTRAVENOUS at 06:04

## 2025-04-05 RX ADMIN — NICOTINE 1 PATCH: 14 PATCH TRANSDERMAL at 08:04

## 2025-04-05 RX ADMIN — OXYCODONE HYDROCHLORIDE 5 MG: 5 TABLET ORAL at 10:04

## 2025-04-05 RX ADMIN — METOPROLOL SUCCINATE 25 MG: 25 TABLET, EXTENDED RELEASE ORAL at 08:04

## 2025-04-05 RX ADMIN — CEFAZOLIN 2 G: 2 INJECTION, POWDER, FOR SOLUTION INTRAMUSCULAR; INTRAVENOUS at 11:04

## 2025-04-05 RX ADMIN — POLYETHYLENE GLYCOL 3350 17 G: 17 POWDER, FOR SOLUTION ORAL at 08:04

## 2025-04-05 NOTE — PROGRESS NOTES
U Internal Medicine History and Physical     Date of Admit: 4/3/2025    Chief Complaint     No chief complaint on file.   for IV abx therapy for L shoulder osteo/septic arthritis    Subjective:      History of Present Illness:  Kimani Redd is a 59 y.o. male who has a PMH of CAD with ICMO (EF45%), carotid stenosis, HTN, HLD. The patient presented as transfer to [facility] from [facility] as direct in patient transfer on 4/3/2025 for IV abx therapy for L clavicular osteomyelitis and AC joint septic arthritis; patient is s/p I&D and excision of distal clavicle 3/25/2025. Patient initially also had blood cultures + for MSSA on admission to [facility], but repeat cultures were negative 2-3 days later. Patient had SHRUTHI without vegetations. ID at [facility] recommended 6 weeks IV abx with ancef, but patient is self pay, so was transferred to [facility] for abx therapy. Upon admission, patient without complaints, and feels overall well. The patient denies headaches, changes in vision, nausea, emesis, fevers, chills, chest pain, palpitations, dyspnea, abdominal pain, or changes in urinary or bowel habits.     Interval Hx  NAEON, patient states he is doing well, pain controlled. ID consulted yesterday for pricing. Pending decision if patient wants to do OPAT.    Past Medical History:  Past Medical History:   Diagnosis Date    Kidney stone     passed 2 weeks ago       Past Surgical History:  Past Surgical History:   Procedure Laterality Date    CAROTID STENT      INCISION AND DRAINAGE, UPPER EXTREMITY Left 3/25/2025    Procedure: INCISION AND DRAINAGE, UPPER EXTREMITY;  Surgeon: Ernesto Jefferson MD;  Location: [facility];  Service: Orthopedics;  Laterality: Left;  Irrigation and debridement left clavicle; left distal clavicle excision; supine, reverse on vascular bed, wash stuff, second case       Allergies:  Review of patient's allergies indicates:  No Known Allergies    Home Medications:  Prior to Admission medications    Medication Sig Start Date End  Date Taking? Authorizing Provider   aspirin (ECOTRIN) 81 MG EC tablet Take 81 mg by mouth once daily.    Provider, Historical   ceFAZolin 2 g/50mL Dextrose IVPB (ANCEF) 2 gram/50 mL PgBk Inject 50 mLs (2,000 mg total) into the vein every 8 (eight) hours. 4/3/25   Mirian Recio MD   fenofibrate 160 MG Tab Take 1 tablet (160 mg total) by mouth once daily. 3/11/25   Jessi Leyva FNP   gabapentin (NEURONTIN) 300 MG capsule Take 1 capsule (300 mg total) by mouth 3 (three) times daily. for 10 days 3/8/25 3/18/25  Azra Mclean MD   metoprolol succinate (TOPROL-XL) 25 MG 24 hr tablet Take 1 tablet (25 mg total) by mouth once daily. 4/4/25 4/4/26  Mirian Recio MD   cyclobenzaprine (FLEXERIL) 10 MG tablet Take 1 tablet (10 mg total) by mouth 3 (three) times daily as needed for Muscle spasms. 3/11/25 4/3/25  Jessi Leyva FNP   diclofenac (VOLTAREN) 50 MG EC tablet Take 1 tablet (50 mg total) by mouth 3 (three) times daily as needed (pain). 3/11/25 4/3/25  Jessi Leyva FNP   HYDROcodone-acetaminophen (NORCO) 7.5-325 mg per tablet Take 1 tablet by mouth every 6 (six) hours as needed for Pain. 3/11/25 4/3/25  Jessi Leyva FNP   LIDOcaine (LIDODERM) 5 % Place 1 patch onto the skin once daily. Remove & Discard patch within 12 hours or as directed by MD 3/6/25 4/3/25  Hudson Dougherty MD   metoprolol tartrate (LOPRESSOR) 25 MG tablet Take 0.5 tablets (12.5 mg total) by mouth once daily. 8/20/24 4/3/25  Jessi Leyva FNP   predniSONE (DELTASONE) 20 MG tablet Take 1 tablet (20 mg total) by mouth once daily. 3/11/25 4/3/25  Jessi Leyva FNP   simvastatin (ZOCOR) 40 MG tablet TAKE ONE TABLET BY MOUTH EVERY EVENING 10/30/23 4/3/25  Jessi Leyva FNP       Family History:  No family history on file.    Social History:  Social History[1]    Review of Systems:  Review of Systems   Constitutional:  Negative for chills and fever.   Respiratory:  Negative for cough, sputum  production and shortness of breath.    Cardiovascular:  Negative for chest pain and leg swelling.   Gastrointestinal:  Negative for abdominal pain, diarrhea, heartburn, nausea and vomiting.   Genitourinary:  Negative for dysuria.   Musculoskeletal:  Negative for myalgias and neck pain.   Neurological:  Negative for dizziness and weakness.              Objective:   Last 24 Hour Vital Signs:  Vitals  BP: 117/78  Temp: 98.2 °F (36.8 °C)  Temp Source: Oral  Pulse: 78  Resp: 18  SpO2: 98 %  Weight: 68.5 kg (151 lb)    Physical Examination:  General: Patient resting comfortably, in no acute distress   Eye: pupils equal, EOMI, clear conjunctiva, eyelids normal  HENT: Head-normocephalic and atraumatic  Neck: full range of motion, no thyromegaly or lymphadenopathy, trachea midline, supple  Respiratory: clear to auscultation bilaterally without wheezes, rales, rhonchi  Cardiovascular: regular rate and rhythm without murmurs.  No gallops or rubs, no JVD.  Capillary refill within normal limits.  Gastrointestinal: soft, non-tender, non-distended with normal bowel sounds, without masses to palpation  Genitourinary: no CVA tenderness to palpation  Musculoskeletal: full range of motion of all extremities/spine without limitation or discomfort; L shoulder with dressed surgical site; clean/dry/intact  Integumentary: no rashes or skin lesions present  Neurologic: no signs of peripheral neurological deficit, motor/sensory function intact  Psychiatric:  alert and oriented, cognitive function intact, cooperative with exam        Laboratory:  Most Recent Data:  Most Recent Data:  CBC:   Lab Results   Component Value Date    WBC 9.86 04/05/2025    HGB 14.8 04/05/2025    HCT 44.8 04/05/2025     (H) 04/05/2025    MCV 88.4 04/05/2025    RDW 11.9 04/05/2025     WBC Differential:   Recent Labs   Lab 04/03/25  0600 04/04/25  0554 04/05/25  0459   WBC 11.76* 10.33 9.86   HGB 14.4 14.6 14.8   HCT 43.4 43.5 44.8   * 476* 440*   MCV  "89.1 88.4 88.4     BMP:   Lab Results   Component Value Date     04/05/2025    K 4.6 04/05/2025     04/05/2025    CO2 26 04/05/2025    BUN 15.7 04/05/2025    CREATININE 0.69 (L) 04/05/2025    CALCIUM 9.7 04/05/2025    MG 2.10 04/05/2025    PHOS 3.2 04/05/2025     LFTs:   Lab Results   Component Value Date    ALBUMIN 3.4 (L) 04/05/2025    BILITOT 0.3 04/05/2025    AST 15 04/05/2025    ALKPHOS 89 04/05/2025    ALT 12 04/05/2025     Coags:   Lab Results   Component Value Date    INR 1.01 (L) 07/03/2017     FLP:   Lab Results   Component Value Date    CHOL 255 (H) 09/07/2024    HDL 40 09/07/2024    TRIG 138 09/07/2024     DM:   Lab Results   Component Value Date    HGBA1C 4.8 09/07/2024    HGBA1C 5.0 09/02/2023    HGBA1C 5.1 08/09/2022    CREATININE 0.69 (L) 04/05/2025     Thyroid:   Lab Results   Component Value Date    TSH 1.168 09/07/2024     Anemia: No results found for: "IRON", "TIBC", "FERRITIN", "[id number]", "FOLATE"  Cardiac: No results found for: "TROPONINI", "CKTOTAL", "CKMB", "BNP"  Urinalysis:   Lab Results   Component Value Date    COLORU Yellow 06/24/2024    NITRITE Negative 06/24/2024    KETONESU Negative 07/17/2021    UROBILINOGEN 0.2 06/24/2024    WBCUA None Seen 06/24/2024       Radiology:           Assessment & Plan:   Left AC joint septic arthritis with osteomyelitis of the distal clavicle and acromion; s/p distal clavicle excision (3/25/2025)  MSSA bacteremia (resolved) likely secondary to left shoulder abscess  Left shoulder abscess status post I and D - 03/15  - patient was transferred to [facility] for orthopedic services, had L shoulder I&D, and found to have MSSA in tissue culture and blood cultures; (repeat blood cultures negative on 3/17/2025)  - MRI showed evidence of osteo of L clavicle and AC joint septic arthritis  - had excision of L distal clavicle 03/25/2025  - ID recommended 6 weeks IV abx therapy with ancef 2 mg q8h, with end date May 2nd.  - transferred to [facility] due to " insurance issues  - will start on Ancef 2mg q8h  - abx end date will be May 2nd, 2025  - patient presented with PICC, so will get blood cultures  - will start oxycodone 5 mg q4h; will start miralax daily for bowel ppx  - ID consulted for OPAT; pending price with CM    CAD  ICMO, EF 45%, RMWA  - will start home ASA 81 mg daily  - will start home toprol 25 mg daily    Insomnia  - started home benadryl 25 mg daily      CODE STATUS: Full Code  Access: Peripheral  Antibiotics: Ancef  Diet: Cardiac  DVT Prophylaxis: Lovenox  GI Prophylaxis: none needed  Fluids: none    Dispo: Patient being treated for L clavicular osteo; IV abx for 6 weeks, pending OPAT option    Refugio Holley MD  Family Medicine, [facility]            [1]   Social History  Tobacco Use    Smoking status: Every Day     Current packs/day: 0.50     Types: Cigarettes    Smokeless tobacco: Never   Substance Use Topics    Alcohol use: Never    Drug use: Never

## 2025-04-06 LAB
ALBUMIN SERPL-MCNC: 3.3 G/DL (ref 3.5–5)
ALBUMIN/GLOB SERPL: 0.8 RATIO (ref 1.1–2)
ALP SERPL-CCNC: 88 UNIT/L (ref 40–150)
ALT SERPL-CCNC: 10 UNIT/L (ref 0–55)
ANION GAP SERPL CALC-SCNC: 6 MEQ/L
AST SERPL-CCNC: 16 UNIT/L (ref 11–45)
BASOPHILS # BLD AUTO: 0.05 X10(3)/MCL
BASOPHILS NFR BLD AUTO: 0.6 %
BILIRUB SERPL-MCNC: 0.3 MG/DL
BUN SERPL-MCNC: 15.4 MG/DL (ref 8.4–25.7)
CALCIUM SERPL-MCNC: 9.5 MG/DL (ref 8.4–10.2)
CHLORIDE SERPL-SCNC: 105 MMOL/L (ref 98–107)
CO2 SERPL-SCNC: 27 MMOL/L (ref 22–29)
CREAT SERPL-MCNC: 0.66 MG/DL (ref 0.72–1.25)
CREAT/UREA NIT SERPL: 23
EOSINOPHIL # BLD AUTO: 0.56 X10(3)/MCL (ref 0–0.9)
EOSINOPHIL NFR BLD AUTO: 6.2 %
ERYTHROCYTE [DISTWIDTH] IN BLOOD BY AUTOMATED COUNT: 11.8 % (ref 11.5–17)
GFR SERPLBLD CREATININE-BSD FMLA CKD-EPI: >60 ML/MIN/1.73/M2
GLOBULIN SER-MCNC: 3.9 GM/DL (ref 2.4–3.5)
GLUCOSE SERPL-MCNC: 96 MG/DL (ref 74–100)
HCT VFR BLD AUTO: 42.7 % (ref 42–52)
HGB BLD-MCNC: 14.1 G/DL (ref 14–18)
IMM GRANULOCYTES # BLD AUTO: 0.03 X10(3)/MCL (ref 0–0.04)
IMM GRANULOCYTES NFR BLD AUTO: 0.3 %
LYMPHOCYTES # BLD AUTO: 3.27 X10(3)/MCL (ref 0.6–4.6)
LYMPHOCYTES NFR BLD AUTO: 36.1 %
MAGNESIUM SERPL-MCNC: 1.9 MG/DL (ref 1.6–2.6)
MCH RBC QN AUTO: 28.9 PG (ref 27–31)
MCHC RBC AUTO-ENTMCNC: 33 G/DL (ref 33–36)
MCV RBC AUTO: 87.5 FL (ref 80–94)
MONOCYTES # BLD AUTO: 1.01 X10(3)/MCL (ref 0.1–1.3)
MONOCYTES NFR BLD AUTO: 11.1 %
NEUTROPHILS # BLD AUTO: 4.15 X10(3)/MCL (ref 2.1–9.2)
NEUTROPHILS NFR BLD AUTO: 45.7 %
NRBC BLD AUTO-RTO: 0 %
PHOSPHATE SERPL-MCNC: 3.1 MG/DL (ref 2.3–4.7)
PLATELET # BLD AUTO: 406 X10(3)/MCL (ref 130–400)
PMV BLD AUTO: 9.3 FL (ref 7.4–10.4)
POTASSIUM SERPL-SCNC: 4.1 MMOL/L (ref 3.5–5.1)
PROT SERPL-MCNC: 7.2 GM/DL (ref 6.4–8.3)
RBC # BLD AUTO: 4.88 X10(6)/MCL (ref 4.7–6.1)
SODIUM SERPL-SCNC: 138 MMOL/L (ref 136–145)
WBC # BLD AUTO: 9.07 X10(3)/MCL (ref 4.5–11.5)

## 2025-04-06 PROCEDURE — 36415 COLL VENOUS BLD VENIPUNCTURE: CPT

## 2025-04-06 PROCEDURE — 63600175 PHARM REV CODE 636 W HCPCS

## 2025-04-06 PROCEDURE — 11000001 HC ACUTE MED/SURG PRIVATE ROOM

## 2025-04-06 PROCEDURE — 80053 COMPREHEN METABOLIC PANEL: CPT

## 2025-04-06 PROCEDURE — 83735 ASSAY OF MAGNESIUM: CPT

## 2025-04-06 PROCEDURE — 25000003 PHARM REV CODE 250

## 2025-04-06 PROCEDURE — S4991 NICOTINE PATCH NONLEGEND: HCPCS

## 2025-04-06 PROCEDURE — 85025 COMPLETE CBC W/AUTO DIFF WBC: CPT

## 2025-04-06 PROCEDURE — 84100 ASSAY OF PHOSPHORUS: CPT

## 2025-04-06 RX ADMIN — CEFAZOLIN 2 G: 2 INJECTION, POWDER, FOR SOLUTION INTRAMUSCULAR; INTRAVENOUS at 11:04

## 2025-04-06 RX ADMIN — ENOXAPARIN SODIUM 40 MG: 40 INJECTION SUBCUTANEOUS at 04:04

## 2025-04-06 RX ADMIN — METOPROLOL SUCCINATE 25 MG: 25 TABLET, EXTENDED RELEASE ORAL at 08:04

## 2025-04-06 RX ADMIN — OXYCODONE HYDROCHLORIDE 5 MG: 5 TABLET ORAL at 01:04

## 2025-04-06 RX ADMIN — CEFAZOLIN 2 G: 2 INJECTION, POWDER, FOR SOLUTION INTRAMUSCULAR; INTRAVENOUS at 06:04

## 2025-04-06 RX ADMIN — OXYCODONE HYDROCHLORIDE 5 MG: 5 TABLET ORAL at 08:04

## 2025-04-06 RX ADMIN — ASPIRIN 81 MG: 81 TABLET, COATED ORAL at 08:04

## 2025-04-06 RX ADMIN — DIPHENHYDRAMINE HYDROCHLORIDE 25 MG: 25 CAPSULE ORAL at 08:04

## 2025-04-06 RX ADMIN — CEFAZOLIN 2 G: 2 INJECTION, POWDER, FOR SOLUTION INTRAMUSCULAR; INTRAVENOUS at 04:04

## 2025-04-06 RX ADMIN — OXYCODONE HYDROCHLORIDE 5 MG: 5 TABLET ORAL at 04:04

## 2025-04-06 RX ADMIN — OXYCODONE HYDROCHLORIDE 5 MG: 5 TABLET ORAL at 05:04

## 2025-04-06 RX ADMIN — NICOTINE 1 PATCH: 14 PATCH TRANSDERMAL at 08:04

## 2025-04-06 RX ADMIN — HYDROXYZINE PAMOATE 50 MG: 25 CAPSULE ORAL at 08:04

## 2025-04-06 RX ADMIN — POLYETHYLENE GLYCOL 3350 17 G: 17 POWDER, FOR SOLUTION ORAL at 08:04

## 2025-04-06 NOTE — PLAN OF CARE
Problem: Adult Inpatient Plan of Care  Goal: Plan of Care Review  4/6/2025 1644 by Ely Riojas LPN  Outcome: Progressing  4/6/2025 1422 by Ely Riojas LPN  Outcome: Progressing  Goal: Patient-Specific Goal (Individualized)  4/6/2025 1644 by Ely Riojas LPN  Outcome: Progressing  4/6/2025 1422 by Ely Riojas LPN  Outcome: Progressing  Goal: Absence of Hospital-Acquired Illness or Injury  4/6/2025 1644 by Ely Riojas LPN  Outcome: Progressing  4/6/2025 1422 by Ely Riojas LPN  Outcome: Progressing  Goal: Optimal Comfort and Wellbeing  4/6/2025 1644 by Ely Riojas LPN  Outcome: Progressing  4/6/2025 1422 by Ely Riojas LPN  Outcome: Progressing  Goal: Readiness for Transition of Care  4/6/2025 1644 by Ely Riojas LPN  Outcome: Progressing  4/6/2025 1422 by Ely Riojas LPN  Outcome: Progressing     Problem: Infection  Goal: Absence of Infection Signs and Symptoms  4/6/2025 1644 by Ely Riojas LPN  Outcome: Progressing  4/6/2025 1422 by Ely Riojas LPN  Outcome: Progressing     Problem: Wound  Goal: Optimal Coping  4/6/2025 1644 by Ely Riojas LPN  Outcome: Progressing  4/6/2025 1422 by Ely Riojas LPN  Outcome: Progressing  Goal: Optimal Functional Ability  4/6/2025 1644 by Ely Riojas LPN  Outcome: Progressing  4/6/2025 1422 by Ely Riojas LPN  Outcome: Progressing  Goal: Absence of Infection Signs and Symptoms  4/6/2025 1644 by Ely Riojas LPN  Outcome: Progressing  4/6/2025 1422 by Ely Riojas LPN  Outcome: Progressing  Goal: Improved Oral Intake  4/6/2025 1644 by Ely Riojas LPN  Outcome: Progressing  4/6/2025 1422 by Ely Riojas LPN  Outcome: Progressing  Goal: Optimal Pain Control and Function  4/6/2025 1644 by Ely Riojas LPN  Outcome: Progressing  4/6/2025 1422 by Ely Riojas LPN  Outcome: Progressing  Goal: Skin Health and Integrity  4/6/2025 1644 by [place],  AMARA Graves  Outcome: Progressing  4/6/2025 1422 by Ely Riojas LPN  Outcome: Progressing  Goal: Optimal Wound Healing  4/6/2025 1644 by Ely Riojas LPN  Outcome: Progressing  4/6/2025 1422 by Ely Riojas LPN  Outcome: Progressing     Problem: Fall Injury Risk  Goal: Absence of Fall and Fall-Related Injury  4/6/2025 1644 by Ely Riojas LPN  Outcome: Progressing  4/6/2025 1422 by Ely Riojas LPN  Outcome: Progressing

## 2025-04-06 NOTE — PROGRESS NOTES
[facility] Internal Medicine History and Physical     Date of Admit: 4/3/2025    Chief Complaint     No chief complaint on file.   for IV abx therapy for L shoulder osteo/septic arthritis    Subjective:      History of Present Illness:  Kimani Redd is a 59 y.o. male who has a PMH of CAD with ICMO (EF45%), carotid stenosis, HTN, HLD. The patient presented as transfer to [facility] from [facility] as direct in patient transfer on 4/3/2025 for IV abx therapy for L clavicular osteomyelitis and AC joint septic arthritis; patient is s/p I&D and excision of distal clavicle 3/25/2025. Patient initially also had blood cultures + for MSSA on admission to [facility], but repeat cultures were negative 2-3 days later. Patient had SHRUTHI without vegetations. ID at [facility] recommended 6 weeks IV abx with ancef, but patient is self pay, so was transferred to [facility] for abx therapy. Upon admission, patient without complaints, and feels overall well. The patient denies headaches, changes in vision, nausea, emesis, fevers, chills, chest pain, palpitations, dyspnea, abdominal pain, or changes in urinary or bowel habits.     Interval Hx  NAEON, patient states he is doing well. Currently working out finances with his family. Plan for discharge to [facility] tomorrow.     Past Medical History:  Past Medical History:   Diagnosis Date    Kidney stone     passed 2 weeks ago       Past Surgical History:  Past Surgical History:   Procedure Laterality Date    CAROTID STENT      INCISION AND DRAINAGE, UPPER EXTREMITY Left 3/25/2025    Procedure: INCISION AND DRAINAGE, UPPER EXTREMITY;  Surgeon: Ernesto Jefferson MD;  Location: [facility];  Service: Orthopedics;  Laterality: Left;  Irrigation and debridement left clavicle; left distal clavicle excision; supine, reverse on vascular bed, wash stuff, second case       Allergies:  Review of patient's allergies indicates:  No Known Allergies    Home Medications:  Prior to Admission medications    Medication Sig Start Date End Date Taking?  Authorizing Provider   aspirin (ECOTRIN) 81 MG EC tablet Take 81 mg by mouth once daily.    Provider, Historical   ceFAZolin 2 g/50mL Dextrose IVPB (ANCEF) 2 gram/50 mL PgBk Inject 50 mLs (2,000 mg total) into the vein every 8 (eight) hours. 4/3/25   Mirian Recio MD   fenofibrate 160 MG Tab Take 1 tablet (160 mg total) by mouth once daily. 3/11/25   Jessi Leyva FNP   gabapentin (NEURONTIN) 300 MG capsule Take 1 capsule (300 mg total) by mouth 3 (three) times daily. for 10 days 3/8/25 3/18/25  Azra Mclean MD   metoprolol succinate (TOPROL-XL) 25 MG 24 hr tablet Take 1 tablet (25 mg total) by mouth once daily. 4/4/25 4/4/26  Mirian Recio MD   cyclobenzaprine (FLEXERIL) 10 MG tablet Take 1 tablet (10 mg total) by mouth 3 (three) times daily as needed for Muscle spasms. 3/11/25 4/3/25  Jessi Leyva FNP   diclofenac (VOLTAREN) 50 MG EC tablet Take 1 tablet (50 mg total) by mouth 3 (three) times daily as needed (pain). 3/11/25 4/3/25  Jessi Leyva FNP   HYDROcodone-acetaminophen (NORCO) 7.5-325 mg per tablet Take 1 tablet by mouth every 6 (six) hours as needed for Pain. 3/11/25 4/3/25  Jessi Leyva FNP   LIDOcaine (LIDODERM) 5 % Place 1 patch onto the skin once daily. Remove & Discard patch within 12 hours or as directed by MD 3/6/25 4/3/25  Hudson Dougherty MD   metoprolol tartrate (LOPRESSOR) 25 MG tablet Take 0.5 tablets (12.5 mg total) by mouth once daily. 8/20/24 4/3/25  Jessi Leyva FNP   predniSONE (DELTASONE) 20 MG tablet Take 1 tablet (20 mg total) by mouth once daily. 3/11/25 4/3/25  Jessi Leyva FNP   simvastatin (ZOCOR) 40 MG tablet TAKE ONE TABLET BY MOUTH EVERY EVENING 10/30/23 4/3/25  Jessi Leyva FNP       Family History:  No family history on file.    Social History:  Social History[1]    Review of Systems:  Review of Systems   Constitutional:  Negative for chills and fever.   Respiratory:  Negative for cough, sputum production  "and shortness of breath.    Cardiovascular:  Negative for chest pain and leg swelling.   Gastrointestinal:  Negative for abdominal pain, diarrhea, heartburn, nausea and vomiting.   Genitourinary:  Negative for dysuria.   Musculoskeletal:  Negative for myalgias and neck pain.   Neurological:  Negative for dizziness and weakness.              Objective:   Last 24 Hour Vital Signs:  Vitals  BP: 117/76  Temp: 98.3 °F (36.8 °C)  Temp Source: Oral  Pulse: 77  Resp: 18  SpO2: 98 %  Height: 5' 5" (165.1 cm)  Weight: 68.5 kg (151 lb)    Physical Examination:  General: Patient resting comfortably, in no acute distress   Eye: pupils equal, EOMI, clear conjunctiva, eyelids normal  HENT: Head-normocephalic and atraumatic  Neck: full range of motion, no thyromegaly or lymphadenopathy, trachea midline, supple  Respiratory: clear to auscultation bilaterally without wheezes, rales, rhonchi  Cardiovascular: regular rate and rhythm without murmurs.  No gallops or rubs, no JVD.  Capillary refill within normal limits.  Gastrointestinal: soft, non-tender, non-distended with normal bowel sounds, without masses to palpation  Genitourinary: no CVA tenderness to palpation  Musculoskeletal: full range of motion of all extremities/spine without limitation or discomfort; L shoulder with dressed surgical site; clean/dry/intact  Integumentary: no rashes or skin lesions present  Neurologic: no signs of peripheral neurological deficit, motor/sensory function intact  Psychiatric:  alert and oriented, cognitive function intact, cooperative with exam        Laboratory:  Most Recent Data:  Most Recent Data:  CBC:   Lab Results   Component Value Date    WBC 9.07 04/06/2025    HGB 14.1 04/06/2025    HCT 42.7 04/06/2025     (H) 04/06/2025    MCV 87.5 04/06/2025    RDW 11.8 04/06/2025     WBC Differential:   Recent Labs   Lab 04/03/25  0600 04/04/25  0554 04/05/25  0459 04/06/25  0431   WBC 11.76* 10.33 9.86 9.07   HGB 14.4 14.6 14.8 14.1   HCT 43.4 " "43.5 44.8 42.7   * 476* 440* 406*   MCV 89.1 88.4 88.4 87.5     BMP:   Lab Results   Component Value Date     04/06/2025    K 4.1 04/06/2025     04/06/2025    CO2 27 04/06/2025    BUN 15.4 04/06/2025    CREATININE 0.66 (L) 04/06/2025    CALCIUM 9.5 04/06/2025    MG 1.90 04/06/2025    PHOS 3.1 04/06/2025     LFTs:   Lab Results   Component Value Date    ALBUMIN 3.3 (L) 04/06/2025    BILITOT 0.3 04/06/2025    AST 16 04/06/2025    ALKPHOS 88 04/06/2025    ALT 10 04/06/2025     Coags:   Lab Results   Component Value Date    INR 1.01 (L) 07/03/2017     FLP:   Lab Results   Component Value Date    CHOL 255 (H) 09/07/2024    HDL 40 09/07/2024    TRIG 138 09/07/2024     DM:   Lab Results   Component Value Date    HGBA1C 4.8 09/07/2024    HGBA1C 5.0 09/02/2023    HGBA1C 5.1 08/09/2022    CREATININE 0.66 (L) 04/06/2025     Thyroid:   Lab Results   Component Value Date    TSH 1.168 09/07/2024     Anemia: No results found for: "IRON", "TIBC", "FERRITIN", "[id number]", "FOLATE"  Cardiac: No results found for: "TROPONINI", "CKTOTAL", "CKMB", "BNP"  Urinalysis:   Lab Results   Component Value Date    COLORU Yellow 06/24/2024    NITRITE Negative 06/24/2024    KETONESU Negative 07/17/2021    UROBILINOGEN 0.2 06/24/2024    WBCUA None Seen 06/24/2024       Radiology:           Assessment & Plan:   Left AC joint septic arthritis with osteomyelitis of the distal clavicle and acromion; s/p distal clavicle excision (3/25/2025)  MSSA bacteremia (resolved) likely secondary to left shoulder abscess  Left shoulder abscess status post I and D - 03/15  - patient was transferred to [facility] for orthopedic services, had L shoulder I&D, and found to have MSSA in tissue culture and blood cultures; (repeat blood cultures negative on 3/17/2025)  - MRI showed evidence of osteo of L clavicle and AC joint septic arthritis  - had excision of L distal clavicle 03/25/2025  - ID recommended 6 weeks IV abx therapy with ancef 2 mg q8h, with end " date May 2nd.  - transferred to [facility] due to insurance issues  - will start on Ancef 2mg q8h  - abx end date will be May 2nd, 2025  - patient presented with PICC, so will get blood cultures  - will start oxycodone 5 mg q4h; will start miralax daily for bowel ppx  - plan for OPAT tomorrow    CAD  ICMO, EF 45%, RMWA  - continue home ASA 81 mg daily  - continue home toprol 25 mg daily    Insomnia  - continue benadryl 25 mg daily      CODE STATUS: Full Code  Access: Peripheral  Antibiotics: Ancef  Diet: Cardiac  DVT Prophylaxis: Lovenox  GI Prophylaxis: none needed  Fluids: none    Dispo: Patient being treated for L clavicular osteo; IV abx for 6 weeks. Plan for d/c to OPAT tomorrow.    Refugio Holley MD  Family Medicine, [facility]            [1]   Social History  Tobacco Use    Smoking status: Every Day     Current packs/day: 0.50     Types: Cigarettes    Smokeless tobacco: Never   Substance Use Topics    Alcohol use: Never    Drug use: Never

## 2025-04-06 NOTE — PLAN OF CARE
Spoke to Bhavesh with [employer], P: [phone], who stated that she spoke to patient and his sister regarding price of home IV antibiotics. They wanted to discuss it and think about it over the weekend. She stated that she will be in touch with them on Monday, 4/7/25 to see if they've made a decision. CM will follow.

## 2025-04-07 ENCOUNTER — TELEPHONE (OUTPATIENT)
Dept: INFECTIOUS DISEASES | Facility: HOSPITAL | Age: 60
End: 2025-04-07
Payer: MEDICAID

## 2025-04-07 VITALS
WEIGHT: 151 LBS | SYSTOLIC BLOOD PRESSURE: 122 MMHG | HEART RATE: 76 BPM | HEIGHT: 65 IN | DIASTOLIC BLOOD PRESSURE: 77 MMHG | RESPIRATION RATE: 18 BRPM | TEMPERATURE: 98 F | BODY MASS INDEX: 25.16 KG/M2 | OXYGEN SATURATION: 100 %

## 2025-04-07 DIAGNOSIS — M86.9 OSTEOMYELITIS, UNSPECIFIED SITE, UNSPECIFIED TYPE: Primary | ICD-10-CM

## 2025-04-07 LAB
ALBUMIN SERPL-MCNC: 3.6 G/DL (ref 3.5–5)
ALBUMIN/GLOB SERPL: 0.9 RATIO (ref 1.1–2)
ALP SERPL-CCNC: 93 UNIT/L (ref 40–150)
ALT SERPL-CCNC: 10 UNIT/L (ref 0–55)
ANION GAP SERPL CALC-SCNC: 4 MEQ/L
AST SERPL-CCNC: 16 UNIT/L (ref 11–45)
BASOPHILS # BLD AUTO: 0.04 X10(3)/MCL
BASOPHILS NFR BLD AUTO: 0.4 %
BILIRUB SERPL-MCNC: 0.4 MG/DL
BUN SERPL-MCNC: 17.3 MG/DL (ref 8.4–25.7)
CALCIUM SERPL-MCNC: 9.7 MG/DL (ref 8.4–10.2)
CHLORIDE SERPL-SCNC: 107 MMOL/L (ref 98–107)
CO2 SERPL-SCNC: 25 MMOL/L (ref 22–29)
CREAT SERPL-MCNC: 0.73 MG/DL (ref 0.72–1.25)
CREAT/UREA NIT SERPL: 24
CRP SERPL-MCNC: 7.4 MG/L
EOSINOPHIL # BLD AUTO: 0.41 X10(3)/MCL (ref 0–0.9)
EOSINOPHIL NFR BLD AUTO: 4.5 %
ERYTHROCYTE [DISTWIDTH] IN BLOOD BY AUTOMATED COUNT: 11.8 % (ref 11.5–17)
ERYTHROCYTE [SEDIMENTATION RATE] IN BLOOD: 48 MM/HR (ref 0–15)
GFR SERPLBLD CREATININE-BSD FMLA CKD-EPI: >60 ML/MIN/1.73/M2
GLOBULIN SER-MCNC: 4.1 GM/DL (ref 2.4–3.5)
GLUCOSE SERPL-MCNC: 100 MG/DL (ref 74–100)
HCT VFR BLD AUTO: 44.4 % (ref 42–52)
HGB BLD-MCNC: 14.7 G/DL (ref 14–18)
IMM GRANULOCYTES # BLD AUTO: 0.02 X10(3)/MCL (ref 0–0.04)
IMM GRANULOCYTES NFR BLD AUTO: 0.2 %
LYMPHOCYTES # BLD AUTO: 3.06 X10(3)/MCL (ref 0.6–4.6)
LYMPHOCYTES NFR BLD AUTO: 33.7 %
MAGNESIUM SERPL-MCNC: 2 MG/DL (ref 1.6–2.6)
MCH RBC QN AUTO: 28.9 PG (ref 27–31)
MCHC RBC AUTO-ENTMCNC: 33.1 G/DL (ref 33–36)
MCV RBC AUTO: 87.4 FL (ref 80–94)
MONOCYTES # BLD AUTO: 0.86 X10(3)/MCL (ref 0.1–1.3)
MONOCYTES NFR BLD AUTO: 9.5 %
NEUTROPHILS # BLD AUTO: 4.69 X10(3)/MCL (ref 2.1–9.2)
NEUTROPHILS NFR BLD AUTO: 51.7 %
NRBC BLD AUTO-RTO: 0 %
PHOSPHATE SERPL-MCNC: 3.2 MG/DL (ref 2.3–4.7)
PLATELET # BLD AUTO: 433 X10(3)/MCL (ref 130–400)
PMV BLD AUTO: 9.5 FL (ref 7.4–10.4)
POTASSIUM SERPL-SCNC: 4.1 MMOL/L (ref 3.5–5.1)
PROT SERPL-MCNC: 7.7 GM/DL (ref 6.4–8.3)
RBC # BLD AUTO: 5.08 X10(6)/MCL (ref 4.7–6.1)
SODIUM SERPL-SCNC: 136 MMOL/L (ref 136–145)
WBC # BLD AUTO: 9.08 X10(3)/MCL (ref 4.5–11.5)

## 2025-04-07 PROCEDURE — 86140 C-REACTIVE PROTEIN: CPT | Performed by: NURSE PRACTITIONER

## 2025-04-07 PROCEDURE — 25000003 PHARM REV CODE 250

## 2025-04-07 PROCEDURE — 80053 COMPREHEN METABOLIC PANEL: CPT

## 2025-04-07 PROCEDURE — 85025 COMPLETE CBC W/AUTO DIFF WBC: CPT

## 2025-04-07 PROCEDURE — 63600175 PHARM REV CODE 636 W HCPCS

## 2025-04-07 PROCEDURE — 85652 RBC SED RATE AUTOMATED: CPT | Performed by: NURSE PRACTITIONER

## 2025-04-07 PROCEDURE — 84100 ASSAY OF PHOSPHORUS: CPT

## 2025-04-07 PROCEDURE — S4991 NICOTINE PATCH NONLEGEND: HCPCS

## 2025-04-07 PROCEDURE — 11000001 HC ACUTE MED/SURG PRIVATE ROOM

## 2025-04-07 PROCEDURE — 83735 ASSAY OF MAGNESIUM: CPT

## 2025-04-07 PROCEDURE — 36415 COLL VENOUS BLD VENIPUNCTURE: CPT | Performed by: NURSE PRACTITIONER

## 2025-04-07 RX ORDER — OXYCODONE HYDROCHLORIDE 5 MG/1
5 TABLET ORAL EVERY 6 HOURS PRN
Qty: 20 TABLET | Refills: 0 | Status: SHIPPED | OUTPATIENT
Start: 2025-04-07 | End: 2025-04-12

## 2025-04-07 RX ORDER — ACETAMINOPHEN 325 MG/1
650 TABLET ORAL EVERY 6 HOURS PRN
Qty: 40 TABLET | Refills: 0 | Status: SHIPPED | OUTPATIENT
Start: 2025-04-07 | End: 2025-04-17

## 2025-04-07 RX ADMIN — POLYETHYLENE GLYCOL 3350 17 G: 17 POWDER, FOR SOLUTION ORAL at 08:04

## 2025-04-07 RX ADMIN — OXYCODONE HYDROCHLORIDE 5 MG: 5 TABLET ORAL at 09:04

## 2025-04-07 RX ADMIN — NICOTINE 1 PATCH: 14 PATCH TRANSDERMAL at 08:04

## 2025-04-07 RX ADMIN — OXYCODONE HYDROCHLORIDE 5 MG: 5 TABLET ORAL at 01:04

## 2025-04-07 RX ADMIN — OXYCODONE HYDROCHLORIDE 5 MG: 5 TABLET ORAL at 04:04

## 2025-04-07 RX ADMIN — CEFAZOLIN 2 G: 2 INJECTION, POWDER, FOR SOLUTION INTRAMUSCULAR; INTRAVENOUS at 02:04

## 2025-04-07 RX ADMIN — CEFAZOLIN 2 G: 2 INJECTION, POWDER, FOR SOLUTION INTRAMUSCULAR; INTRAVENOUS at 06:04

## 2025-04-07 RX ADMIN — ASPIRIN 81 MG: 81 TABLET, COATED ORAL at 08:04

## 2025-04-07 RX ADMIN — METOPROLOL SUCCINATE 25 MG: 25 TABLET, EXTENDED RELEASE ORAL at 08:04

## 2025-04-07 NOTE — TELEPHONE ENCOUNTER
Please schedule patient with post elizabeth ID follow up virtual visit on 04/23/2025 with Bre NUNEZ for MSSA bacteremia / osteo / OPAT.  Patient will be OPAT and will need a blue chart  Patient will do weekly labs in [place] or at [facility]  Thank you

## 2025-04-07 NOTE — DISCHARGE INSTRUCTIONS
Daily wound care:  Left shoulder-Remove packing. Cleanse wound bed with Vashe. Loosely pack 1/4 inch Vashe moistened plain packing strips to wound bed. Cover with gauze and abd pad. Secure with cloth tape. Change daily and as needed.           Our goal at [facility] is to always give you outstanding care and exceptional service. You may receive a survey from [employer] by mail, text or e-mail in the next 24-48 hours asking about the care you received with us. The survey should only take 5-10 minutes to complete and is very important to us.     Your feedback provides us with a way to recognize our staff who work tirelessly to provide the best care! Also, your responses help us learn how to improve when your experience was below our aspiration of excellence. We are always looking for ways to improve your stay. We WILL use your feedback to continue making improvements to help us provide the highest quality care. We keep your personal information and feedback confidential. We appreciate your time completing this survey and can't wait to hear from you!!!    We look forward to your continued care with us! Thanks so much for choosing [facility] for your healthcare needs!

## 2025-04-07 NOTE — PLAN OF CARE
Received message from Bhavesh with [employer], P: [phone], stating that patient and family are in agreement for self pay pricing. She will be coming to the hospital to provide patient and family education. MD team has been updated and plan is to DC today once OPAT teaching has been completed.

## 2025-04-07 NOTE — PLAN OF CARE
04/07/25 1056   Final Note   Assessment Type Final Discharge Note   Anticipated Discharge Disposition IV Therapy   Post-Acute Status   Post-Acute Authorization IV Infusion   IV Infusion Status Set-up Complete/Auth obtained   Discharge Delays   (Pending OPAT teaching)     Patient is being discharged home with OPAT provided by [facility].

## 2025-04-07 NOTE — DISCHARGE SUMMARY
[facility] Discharge Summary    Admitting Physician: Pb Pearce MD  Attending Physician: Luba Storm MD  Date of Admit: 4/3/2025  Date of Discharge: 4/7/2025    Condition: Stable  Outcome: Patient tolerated treatment/procedure well without complication and is now ready for discharge.  DISPOSITION: Home or Self Care        Discharge Diagnoses:     Problem List[1]    Principal Problem:  Osteomyelitis    Consultants and Procedures:     Consultants:  IP CONSULT TO INFECTIOUS DISEASES  IP CONSULT TO PICC TEAM (NIAS)  IP CONSULT TO SOCIAL WORK/CASE MANAGEMENT    Procedures:   * No surgery found *      Brief Admission History:      Kimani Redd is a 59 y.o. male who has a PMH of CAD with ICMO (EF45%), carotid stenosis, HTN, HLD. The patient presented as transfer to [facility] from [facility] as direct in patient transfer on 4/3/2025 for IV abx therapy for L clavicular osteomyelitis and AC joint septic arthritis; patient is s/p I&D and excision of distal clavicle 3/25/2025. Patient initially also had blood cultures + for MSSA on admission to [facility], but repeat cultures were negative 2-3 days later. Patient had SHRUTHI without vegetations. ID at [facility] recommended 6 weeks IV abx with ancef, but patient is self pay, so was transferred to [facility] for abx therapy. Upon admission, patient without complaints, and feels overall well. The patient denies headaches, changes in vision, nausea, emesis, fevers, chills, chest pain, palpitations, dyspnea, abdominal pain, or changes in urinary or bowel habits.     Hospital Course with Pertinent Findings:      Patient initially presented as transfer from [facility] on 4/3/25 for IV abx therapy after a L clavicular osteomyelitis and AC joint septic arthritis. Continued on abx therapy since admission. No acute events during hospitalization. Patient willing to take cost of OPAT. Will go home with OPAT per ID, Ancef until 5/5/25. No concerns on day of discharge.    Discharge physical  "exam:  Vitals  BP: 119/77  Temp: 97.8 °F (36.6 °C)  Temp Source: Oral  Pulse: 83  Resp: 18  SpO2: 96 %  Height: 5' 5" (165.1 cm)  Weight: 68.5 kg (151 lb)      TIME SPENT ON DISCHARGE: 35 minutes    Discharge Medications:         Medication List        START taking these medications      acetaminophen 325 MG tablet  Commonly known as: TYLENOL  Take 2 tablets (650 mg total) by mouth every 6 (six) hours as needed.     oxyCODONE 5 MG immediate release tablet  Commonly known as: ROXICODONE  Take 1 tablet (5 mg total) by mouth every 6 (six) hours as needed for Pain.            CONTINUE taking these medications      aspirin 81 MG EC tablet  Commonly known as: ECOTRIN     ceFAZolin 2 g/50mL Dextrose IVPB 2 gram/50 mL Pgbk  Commonly known as: ANCEF  Inject 50 mLs (2,000 mg total) into the vein every 8 (eight) hours.     fenofibrate 160 MG Tab  Take 1 tablet (160 mg total) by mouth once daily.     gabapentin 300 MG capsule  Commonly known as: NEURONTIN  Take 1 capsule (300 mg total) by mouth 3 (three) times daily. for 10 days     metoprolol succinate 25 MG 24 hr tablet  Commonly known as: TOPROL-XL  Take 1 tablet (25 mg total) by mouth once daily.               Where to Get Your Medications        These medications were sent to [facility][id number] at [address] AT [facility] [address]      Phone: [phone]   acetaminophen 325 MG tablet  oxyCODONE 5 MG immediate release tablet         Discharge Instructions:         Kimani Redd is being discharged .    No discharge procedures on file.     Follow-Up Appointments:   Follow-up Information       Jessi Leyva FNP. Schedule an appointment as soon as possible for a visit.    Specialty: Family Medicine  Contact information:  [address]  [address]  [facility] [address]  [phone]                             - Continue OPAT therapy with Ancef 2g IV q8 to complete at 42 day course until " 5/5/25      At this time, Kimani Redd is determined to have maximized benefits of IP hospitalization. he is discharged in stable condition with OP f/u recommendations and instructions. All questions answered, and patient verbalized agreement with the POC. They were given return precautions prior to d/c including symptoms that should prompt return to ED or to call PCP. Total time spent of DC of 35 minutes.       Jah Snyder MD  [facility] HO-2               [1]   Patient Active Problem List  Diagnosis    Encounter to establish care    Hypercholesteremia    Hypertriglyceridemia    Hypertension    Depression    Septic arthritis    Osteomyelitis

## 2025-04-07 NOTE — PROGRESS NOTES
AVS virtually reviewed with patient and wife in its entirety with emphasis on diet, medications, follow-up appointments and reasons to return to the ED or contact the [facility] On Call Nurse Care Line. Patient also encouraged to utilize their patient portal. Ease and convenience of use reiterated. Education complete and patient voiced understanding. All questions answered. Discharge teaching complete.

## 2025-04-07 NOTE — PLAN OF CARE
Received message from Bhavesh with [facility] stating that OPAT education has been completed, and patient can be discharged after 2 PM dose of antibiotic.

## 2025-04-07 NOTE — PROGRESS NOTES
[facility]  Infectious Diseases Progress Note        SUBJECTIVE:   HPI: Mr Redd is a 59 yr old WM with past medical history that is significant for hypertension, hyperlipidemia, coronary artery disease, and carotid stenosis who [place] [facility] [place] as he currently has no health care insurance.  Patient had a left shoulder abscess and was found to be bacteremic with MSSA.  Patient underwent a bedside I&D of his left shoulder on 03/15/2025 which showed growth of MSSA also.  Blood cultures were shown to clear as of 03/17/2025.  SHRUTHI was performed on 03/20/2025 showed no concerns for valvular vegetations.  PICC was placed on 03/202/2025 which is after clearance of blood cultures.  MRI of left shoulder was done on 03/21/2025 and showed left shoulder AC joint septic arthritis with osteomyelitis of the distal clavicle and acromion.  Patient was seen by orthopedic surgery and underwent further left clavicle debridement in which purulence was encountered and the left shoulder abscess was further addressed on 03/25/2025.  Intraoperative cultures once again showed MSSA.  Patient was transitioned to antibiotic therapy with Cefazolin on 03/17/2025.  He currently has plans for 42 days of IV antibiotics with Ancef in which originally end date was planned for 05/02/2025, but will extend therapy until 05/05/2025 starting from intraoperative source control on 03/25/2025 as additional purulence was encountered at this time and addressed.  ID has been consulted for antibiotic recommendations for osteomyelitis.  Upon arrival, patient was found to be afebrile with leukocytosis at 11.7 which now has now resolved.  Blood cultures x2 have been repeated and remain pending.  Routine screening for hepatitis/gonorrhea/chlamydia are all negative.  Upon exam, patient has a dressing to his left shoulder and reports pain.  He is also noted with a PICC to the right upper arm  which is currently dislodged and we will need to be replaced.  Patient lives with wife in [place], [place].  Reports he works in construction.  Denies tobacco, alcohol, or illicit drug use to include IVDU.       Interval History: Patient sitting on bedside chair.  No current complaints.  States had a good weekend.  Denies fever, chills, night sweats, rash, HA, vision changes, dizziness, CP/SOB, cough, N/V/D, abdominal pain, or urinary complaints.  Patient is afebrile and noted with no leukocytosis.  CRP is improving.  Screening for HIV/syphilis/hepatitis/gonorrhea/chlamydia are all negative.  Patient had PICC replaced on 04/04/2025.  Patient remains on Cefazolin without reported issues.  States they likely will do OPAT at home; family will help him with cost.       Antibiotic / Antiviral / Antifungal History:  Vancomycin IV trough 15-20 - 03/14/2025 to 03/17/2025  Zosyn 4.5 grams IV q 8 hours - 03/14/2025 to 03/16/2025  Cefazolin 2 g IV q.8 hours - 03/17/2025 to present  Vancomycin IV x1 dose - 03/25/2025          MEDICATIONS:   Reviewed in EMR    REVIEW OF SYSTEMS:   Except as documented, all other systems reviewed and negative     PHYSICAL EXAM:   T 97.8 °F (36.6 °C)   /77   P 83   RR 18   O2 96 %  GENERAL: Middle age WM who is A&Ox3, NAD, does not appear overtly ill  HEENT: atraumatic, normocephalic, anicteric, moist oral mucosa without lesions, exudate, or erythema; no thrush  LUNGS: breathing unlabored, lungs CTA bilateral  HEART: RRR; + murmur, no rub or gallop  ABDOMEN: abdomen soft, nondistended, nontender to palpation  : no CVA tenderness  EXTREMITIES:  Dressing to left shoulder  SKIN: no obvious rashes or lesions  NEURO: speech fluent and intact, facial symmetry preserved, no tremor  PSYCH: cooperative, normal mood and affect  LINES: PICC to RT upper am without s/s infection      LABS AND IMAGING:     Recent Labs     04/06/25  0431 04/07/25  0455   WBC 9.07 9.08   RBC 4.88 5.08   HGB 14.1  "14.7   HCT 42.7 44.4   MCV 87.5 87.4   MCH 28.9 28.9   MCHC 33.0 33.1   RDW 11.8 11.8   * 433*     No results for input(s): "LACTIC" in the last 72 hours.  No results for input(s): "INR", "APTT", "D-DIMER" in the last 72 hours.  No results for input(s): "HGBA1C", "CHOL", "TRIG", "LDL", "VLDL", "HDL" in the last 72 hours.   Recent Labs     04/06/25  0431 04/07/25  0455    136   K 4.1 4.1   CO2 27 25   BUN 15.4 17.3   CREATININE 0.66* 0.73   GLUCOSE 96 100   CALCIUM 9.5 9.7   MG 1.90 2.00   PHOS 3.1 3.2   ALBUMIN 3.3* 3.6   GLOBULIN 3.9* 4.1*   ALKPHOS 88 93   ALT 10 10   AST 16 16   BILITOT 0.3 0.4   CRP  --  7.40*     No results for input(s): "BNP", "CPK", "TROPONINI" in the last 72 hours.       Estimated Creatinine Clearance: 97.1 mL/min (based on SCr of 0.73 mg/dL).   Lab Results   Component Value Date    SEDRATE 48 (H) 04/07/2025      Lab Results   Component Value Date    CRP 7.40 (H) 04/07/2025        Micro:   Colonization:        Results for orders placed or performed during the hospital encounter of 03/15/25   MRSA PCR   Result Value Ref Range     MRSA PCR Screen Not Detected Not Detected     Narrative     The Xpert MRSA Assay utilizes automated real-time polymerase chain reaction (PCR) to detect MRSA DNA.  A positive test result does not necessarily indicate the presence of viable organism.  It is however, presumptive for the presence of MRSA.      03/14/2025 blood cultures x2 with MSSA (2/2 sets)  03/15/2025 left shoulder abscess with MSSA   03/17/2025 blood cultures x2 NGTD   03/25/2025 left tissue clavicle intraoperative culture with MSSA   03/25/2025 left fungal tissue clavicle intraoperative culture pending   04/03/2025 blood cultures x2 NGTD         X-Ray Chest 1 View for Line/Tube Placement  Narrative: EXAMINATION:  XR CHEST 1 VIEW FOR LINE/TUBE PLACEMENT    CLINICAL HISTORY:  picc;    TECHNIQUE:  One view    COMPARISON:  April 1, 2025.    FINDINGS:  Right upper extremity approach PICC " line terminates within the superior vena cava.  Cardiopericardial silhouette is within normal limits. Lungs are without dense focal or segmental consolidation, congestive process, pleural effusions or pneumothorax.  Impression: Optimal placement of the PICC line.    Electronically signed by: Avi Hsu  Date:    04/04/2025  Time:    14:39      SHRUTHI 03/20/2025:   Echocardiography Findings     Left Ventricle The left ventricle is normal in size. Normal wall thickness. Normal wall motion. There is normal systolic function with a visually estimated ejection fraction of 55 - 60%.   Right Ventricle The right ventricle is normal in size. Wall thickness is normal. Systolic function is normal.   Left Atrium Normal left atrial size. The left atrial appendage appears normal. Appendage velocity is normal at greater than 40 cm/sec. There is no thrombus in the left atrial cavity.   Right Atrium Normal right atrial size.   Aortic Valve The aortic valve is structurally normal. Mildly calcified noncoronary cusp. There is normal leaflet mobility. There is no mass/vegetation present. There is no significant regurgitation.   Mitral Valve The mitral valve is structurally normal. There is normal leaflet mobility. There is no mass/vegetation present. There is no stenosis. There is trace regurgitation.   Tricuspid Valve The tricuspid valve is structurally normal. There is no mass/vegetation present. There is normal leaflet mobility. There is no stenosis. There is trace regurgitation.   Pulmonic Valve The pulmonic valve is structurally normal.   Ascending Aorta Ascending aorta is normal. Descending aorta is normal. Grade 2 atherosclerosis. There is no mass present.   Pericardium and Other Findings There is no pericardial effusion.         MRI left shoulder with and without contrast 03/21/2025 :   Impression:  AC joint septic arthritis with osteomyelitis of the distal clavicle and acromion.      ASSESSMENT & PLAN:     MSSA bacteremia  Left  shoulder abscess with septic arthritis   Bedside I&D 03/15/2025  Left clavicular osteomyelitis   S/P left distal clavicle debridement with I&D of left shoulder abscess 03/25/2025  Heart murmur   Leukocytosis - resolved         RECOMMENDATIONS:        Patient will need to continue 6 week course of IV antibiotics for osteomyelitis in the setting of MSSA bacteremia / shoulder abscess (MSSA).    Continue Cefazolin 2 g IV q.8 hours. End date planned for 05/05/2025 (using starting date 03/25/2025 from intraoperative source control date).  Patient is noted with no current insurance coverage.  OPAT orders already written.  Patient is a home OPAT candidate if he chooses to assume cost.  Case management to assist with this.  Patient will do weekly labs at hospital.   Will schedule ID post elizabeth follow up virtual appointment on 04/23/2025 with Bre NUNEZ  ID will continue to follow        KAYA Benjamin  [facility] Infectious Diseases     *Portions of this note dictated using EMR integrated voice recognition software, and may be subject to voice recognition errors not corrected at proofreading. Please contact writer for clarification if needed. *

## 2025-04-07 NOTE — PLAN OF CARE
Problem: Adult Inpatient Plan of Care  Goal: Plan of Care Review  4/6/2025 2223 by Karen Walker RN  Outcome: Progressing  4/6/2025 2223 by Karen Walker RN  Outcome: Progressing  Goal: Patient-Specific Goal (Individualized)  4/6/2025 2223 by Karen Walker RN  Outcome: Progressing  4/6/2025 2223 by Karen Walker RN  Outcome: Progressing  Goal: Absence of Hospital-Acquired Illness or Injury  4/6/2025 2223 by Karen Walker RN  Outcome: Progressing  4/6/2025 2223 by Karen Walker RN  Outcome: Progressing  Goal: Optimal Comfort and Wellbeing  4/6/2025 2223 by Karen Walker RN  Outcome: Progressing  4/6/2025 2223 by Karen Walker RN  Outcome: Progressing  Goal: Readiness for Transition of Care  4/6/2025 2223 by Karen Walker RN  Outcome: Progressing  4/6/2025 2223 by Karen Walker RN  Outcome: Progressing     Problem: Infection  Goal: Absence of Infection Signs and Symptoms  4/6/2025 2223 by Karen Walker RN  Outcome: Progressing  4/6/2025 2223 by Karen Walker RN  Outcome: Progressing     Problem: Wound  Goal: Optimal Coping  4/6/2025 2223 by Karen Walker RN  Outcome: Progressing  4/6/2025 2223 by Karen Walker RN  Outcome: Progressing  Goal: Optimal Functional Ability  4/6/2025 2223 by Karen Walker RN  Outcome: Progressing  4/6/2025 2223 by Karen Walker RN  Outcome: Progressing  Goal: Absence of Infection Signs and Symptoms  4/6/2025 2223 by Karen Walker RN  Outcome: Progressing  4/6/2025 2223 by Karen Walker RN  Outcome: Progressing  Goal: Improved Oral Intake  4/6/2025 2223 by Karen Walker RN  Outcome: Progressing  4/6/2025 2223 by Karen Walker RN  Outcome: Progressing  Goal: Optimal Pain Control and Function  4/6/2025 2223 by Karen Walker RN  Outcome: Progressing  4/6/2025 2223 by Karen Walker RN  Outcome: Progressing  Goal: Skin Health and Integrity  4/6/2025 2223 by Karen Walker RN  Outcome: Progressing  4/6/2025 2223 by Denise  TEGAN Jones  Outcome: Progressing  Goal: Optimal Wound Healing  4/6/2025 2223 by Karen Walker RN  Outcome: Progressing  4/6/2025 2223 by Karen Walker RN  Outcome: Progressing     Problem: Fall Injury Risk  Goal: Absence of Fall and Fall-Related Injury  4/6/2025 2223 by Karen Walker RN  Outcome: Progressing  4/6/2025 2223 by Karen Walker RN  Outcome: Progressing

## 2025-04-08 LAB — FUNGUS SPEC CULT: NORMAL

## 2025-04-09 LAB
BACTERIA BLD CULT: NORMAL
BACTERIA BLD CULT: NORMAL

## 2025-04-14 ENCOUNTER — RESULTS FOLLOW-UP (OUTPATIENT)
Dept: FAMILY MEDICINE | Facility: CLINIC | Age: 60
End: 2025-04-14

## 2025-04-14 ENCOUNTER — CLINICAL SUPPORT (OUTPATIENT)
Dept: INFECTIOUS DISEASES | Facility: CLINIC | Age: 60
End: 2025-04-14
Payer: MEDICAID

## 2025-04-14 DIAGNOSIS — M86.9 OSTEOMYELITIS, UNSPECIFIED SITE, UNSPECIFIED TYPE: Primary | ICD-10-CM

## 2025-04-14 LAB
ALBUMIN SERPL-MCNC: 3.8 G/DL (ref 3.5–5)
ALBUMIN/GLOB SERPL: 0.9 RATIO (ref 1.1–2)
ALP SERPL-CCNC: 99 UNIT/L (ref 40–150)
ALT SERPL-CCNC: 6 UNIT/L (ref 0–55)
ANION GAP SERPL CALC-SCNC: 5 MEQ/L
AST SERPL-CCNC: 14 UNIT/L (ref 11–45)
BASOPHILS # BLD AUTO: 0.05 X10(3)/MCL
BASOPHILS NFR BLD AUTO: 0.4 %
BILIRUB SERPL-MCNC: 0.5 MG/DL
BUN SERPL-MCNC: 14.4 MG/DL (ref 8.4–25.7)
CALCIUM SERPL-MCNC: 10 MG/DL (ref 8.4–10.2)
CHLORIDE SERPL-SCNC: 106 MMOL/L (ref 98–107)
CO2 SERPL-SCNC: 29 MMOL/L (ref 22–29)
CREAT SERPL-MCNC: 0.74 MG/DL (ref 0.72–1.25)
CREAT/UREA NIT SERPL: 19
CRP SERPL-MCNC: 4.9 MG/L
EOSINOPHIL # BLD AUTO: 0.38 X10(3)/MCL (ref 0–0.9)
EOSINOPHIL NFR BLD AUTO: 3.1 %
ERYTHROCYTE [DISTWIDTH] IN BLOOD BY AUTOMATED COUNT: 12.1 % (ref 11.5–17)
ERYTHROCYTE [SEDIMENTATION RATE] IN BLOOD: 13 MM/HR (ref 0–15)
GFR SERPLBLD CREATININE-BSD FMLA CKD-EPI: >60 ML/MIN/1.73/M2
GLOBULIN SER-MCNC: 4.2 GM/DL (ref 2.4–3.5)
GLUCOSE SERPL-MCNC: 92 MG/DL (ref 74–100)
HCT VFR BLD AUTO: 46.5 % (ref 42–52)
HGB BLD-MCNC: 15.4 G/DL (ref 14–18)
IMM GRANULOCYTES # BLD AUTO: 0.04 X10(3)/MCL (ref 0–0.04)
IMM GRANULOCYTES NFR BLD AUTO: 0.3 %
LYMPHOCYTES # BLD AUTO: 3.99 X10(3)/MCL (ref 0.6–4.6)
LYMPHOCYTES NFR BLD AUTO: 32.4 %
MCH RBC QN AUTO: 29.7 PG (ref 27–31)
MCHC RBC AUTO-ENTMCNC: 33.1 G/DL (ref 33–36)
MCV RBC AUTO: 89.8 FL (ref 80–94)
MONOCYTES # BLD AUTO: 1.12 X10(3)/MCL (ref 0.1–1.3)
MONOCYTES NFR BLD AUTO: 9.1 %
NEUTROPHILS # BLD AUTO: 6.72 X10(3)/MCL (ref 2.1–9.2)
NEUTROPHILS NFR BLD AUTO: 54.7 %
NRBC BLD AUTO-RTO: 0 %
PLATELET # BLD AUTO: 368 X10(3)/MCL (ref 130–400)
PMV BLD AUTO: 10 FL (ref 7.4–10.4)
POTASSIUM SERPL-SCNC: 4.8 MMOL/L (ref 3.5–5.1)
PROT SERPL-MCNC: 8 GM/DL (ref 6.4–8.3)
RBC # BLD AUTO: 5.18 X10(6)/MCL (ref 4.7–6.1)
SODIUM SERPL-SCNC: 140 MMOL/L (ref 136–145)
WBC # BLD AUTO: 12.3 X10(3)/MCL (ref 4.5–11.5)

## 2025-04-14 PROCEDURE — 80053 COMPREHEN METABOLIC PANEL: CPT

## 2025-04-14 PROCEDURE — 85025 COMPLETE CBC W/AUTO DIFF WBC: CPT

## 2025-04-14 PROCEDURE — 86140 C-REACTIVE PROTEIN: CPT

## 2025-04-14 PROCEDURE — 36415 COLL VENOUS BLD VENIPUNCTURE: CPT

## 2025-04-14 PROCEDURE — 85652 RBC SED RATE AUTOMATED: CPT

## 2025-04-15 ENCOUNTER — DOCUMENTATION ONLY (OUTPATIENT)
Dept: INFECTIOUS DISEASES | Facility: HOSPITAL | Age: 60
End: 2025-04-15
Payer: MEDICAID

## 2025-04-15 ENCOUNTER — OFFICE VISIT (OUTPATIENT)
Dept: ORTHOPEDICS | Facility: CLINIC | Age: 60
End: 2025-04-15
Payer: MEDICAID

## 2025-04-15 VITALS
HEIGHT: 65 IN | DIASTOLIC BLOOD PRESSURE: 81 MMHG | BODY MASS INDEX: 25.16 KG/M2 | HEART RATE: 80 BPM | SYSTOLIC BLOOD PRESSURE: 131 MMHG | WEIGHT: 151 LBS

## 2025-04-15 DIAGNOSIS — M86.012 ACUTE HEMATOGENOUS OSTEOMYELITIS OF LEFT SHOULDER REGION: Primary | ICD-10-CM

## 2025-04-15 RX ORDER — TRAMADOL HYDROCHLORIDE 50 MG/1
50 TABLET ORAL EVERY 6 HOURS
Qty: 28 TABLET | Refills: 0 | Status: SHIPPED | OUTPATIENT
Start: 2025-04-15

## 2025-04-15 NOTE — PROGRESS NOTES
[facility]      Name: Kimani Redd  : 1965  MRN: [id number]  Date: 04/15/2025    Chief Complaint   Patient presents with    Left Shoulder - Post-op Evaluation     3 wks s/p I&D LT SHOULDER SX 3/25/25-gl 25, reports still having moderate pain, currently doing IV antibiotics 3x day, still has some limited rom at this time,        Subjective:      Chief Complaint   Patient presents with    Left Shoulder - Post-op Evaluation     3 wks s/p I&D LT SHOULDER SX 3/25/25-gl 25, reports still having moderate pain, currently doing IV antibiotics 3x day, still has some limited rom at this time,         HPI  Kimani Redd is a 59 y.o. male presents to clinic 3 weeks status post I and D of left shoulder with excision of distal clavicle.  He has been doing well.  His sister has been re packing his wound daily.  His other family member has been giving him IV antibiotics 3 times daily.  He recently had lab work does following up with Infectious Disease soon.  Reports he does have some pain but it is relatively well-controlled.  Reports he does not need oxycodone for pain control but would like tramadol instead.  Feels well today.  He has been compliant with treatment plan.  No other complaints at this time.  No fever, chills.  No purulent drainage from wound.  Sutures were removed prior to this visit.      ROS:  Constitutional: Denies fever chills  MSK: Pain evident at site of injury located in HPI,   Integ: No signs of abrasions or lacerations  Neuro: No numbness or tingling  Lymphatic: No swelling outside the area of injury     Current Outpatient Medications   Medication Instructions    acetaminophen (TYLENOL) 650 mg, Oral, Every 6 hours PRN    aspirin (ECOTRIN) 81 mg, Daily    ceFAZolin 2 g/50mL Dextrose IVPB (ANCEF) 2 gram/50 mL PgBk 2,000 mg, Intravenous, Every 8 hours    fenofibrate 160 mg, Oral, Daily    gabapentin (NEURONTIN) 300 mg, Oral, 3 times daily    metoprolol succinate  "(TOPROL-XL) 25 mg, Oral, Daily    traMADoL (ULTRAM) 50 mg, Oral, Every 6 hours        Objective:     Visit Vitals  /81   Pulse 80   Ht 5' 5" (1.651 m)   Wt 68.5 kg (151 lb 0.2 oz)   BMI 25.13 kg/m²     Physical Exam    General the patient is alert and oriented x3 no acute distress nontoxic-appearing appropriate affect.    Constitutional: Vital signs are examined and stable.  Resp: No signs of labored breathing    Left upper extremity:           -Skin:  Wound appears to be healing well with packing in place.  Dull purplish erythema noted, no bright red erythema.  No areas of fluctuance palpated.  No drainage noted.           -MSK: AIN/PIN/Median/Radial/Ulnar motor intact.  No ttp of shoulder           -Neuro:  Sensation intact to light touch C5-T1 dermatomes           -Lymphatic: nonpitting edema to surgical site           -CV:Capillary refill is less than 2 seconds. Radial pulse 2+. Compartments Soft and compressible                   Body mass index is 25.13 kg/m².  Ideal body weight: 61.5 kg (135 lb 9.3 oz)  Adjusted ideal body weight: 64.3 kg (141 lb 12.1 oz)  Hgb   Date Value Ref Range Status   04/14/2025 15.4 14.0 - 18.0 g/dL Final   04/07/2025 14.7 14.0 - 18.0 g/dL Final     Hct   Date Value Ref Range Status   04/14/2025 46.5 42.0 - 52.0 % Final   04/07/2025 44.4 42.0 - 52.0 % Final     Vitamin D   Date Value Ref Range Status   09/07/2024 26 (L) 30 - 80 ng/mL Final   09/02/2023 22.0 (L) 30.0 - 80.0 ng/mL Final     WBC   Date Value Ref Range Status   04/14/2025 12.30 (H) 4.50 - 11.50 x10(3)/mcL Final   04/07/2025 9.08 4.50 - 11.50 x10(3)/mcL Final   03/15/2025 26.82 x10(3)/mcL Final       Radiology:   None today    Assessment:       ICD-10-CM ICD-9-CM   1. Acute hematogenous osteomyelitis of left shoulder region  M86.012 730.01       Plan:     1. Acute hematogenous osteomyelitis of left shoulder region  -     traMADoL (ULTRAM) 50 mg tablet; Take 1 tablet (50 mg total) by mouth every 6 (six) hours.  " Dispense: 28 tablet; Refill: 0      Patient is doing well today and has been compliant with treatment plan.  He is receiving IV antibiotics until May 5th.  His having routine lab work performed by Infectious Disease.  Performing wound care daily.  He will return to clinic in 1 month for repeat evaluation and imaging.  Once his wound is healed, we will discuss beginning physical therapy.  Allowed time for questions.  Questions answered to pt satisfaction.  Pt verbalizes understanding and agrees with tx plan. Pt to call clinic with any questions/concerns.      The above findings, diagnostics, and treatment plan were discussed with Dr. Jefferson who is in agreement with the plan of care except as stated in additional documentation.     ROSARIO BarbosaFl[facility]    Future Appointments   Date Time Provider Department Center   4/23/2025  8:00 AM Cecelia Tee FNP [facility] [place]    5/8/2025  8:30 AM Jessi Leyva [facility] Cl   5/15/2025 10:15 AM Ernesto Jefferson MD [facility] [place]   6/4/2025 10:00 AM Odilia Lopes MD [facility] CARD [facility]    6/11/2025 10:30 AM Jessi Leyva [facility]       This note was created with the assistance of voice recognition software or phone dictation. There may be transcription errors as a result of using this technology however minimal. Effort has been made to assure accuracy of transcription but any obvious errors or omissions should be clarified with the author of the document.

## 2025-04-15 NOTE — PROGRESS NOTES
[facility]  Infectious Diseases OPAT Lab Review Note      Medication:  Ancef 2 g IV q.8 hours to complete a 42 day course    Infusion Company: [employer]    Outpatient start date:   Outpatient stop date:  05/05/2025      Labs reviewed:     Lab Results   Component Value Date    WBC 12.30 (H) 04/14/2025    HGB 15.4 04/14/2025    HCT 46.5 04/14/2025    MCV 89.8 04/14/2025     04/14/2025      CMP  Sodium   Date Value Ref Range Status   04/14/2025 140 136 - 145 mmol/L Final     Potassium   Date Value Ref Range Status   04/14/2025 4.8 3.5 - 5.1 mmol/L Final     Chloride   Date Value Ref Range Status   04/14/2025 106 98 - 107 mmol/L Final     CO2   Date Value Ref Range Status   04/14/2025 29 22 - 29 mmol/L Final     Blood Urea Nitrogen   Date Value Ref Range Status   04/14/2025 14.4 8.4 - 25.7 mg/dL Final     Creatinine   Date Value Ref Range Status   04/14/2025 0.74 0.72 - 1.25 mg/dL Final     Calcium   Date Value Ref Range Status   04/14/2025 10.0 8.4 - 10.2 mg/dL Final     Albumin   Date Value Ref Range Status   04/14/2025 3.8 3.5 - 5.0 g/dL Final     Bilirubin Total   Date Value Ref Range Status   04/14/2025 0.5 <=1.5 mg/dL Final     ALP   Date Value Ref Range Status   04/14/2025 99 40 - 150 unit/L Final     AST   Date Value Ref Range Status   04/14/2025 14 11 - 45 unit/L Final     ALT   Date Value Ref Range Status   04/14/2025 6 0 - 55 unit/L Final     eGFR   Date Value Ref Range Status   04/14/2025 >60 mL/min/1.73/m2 Final     Comment:     Estimated GFR calculated using the CKD-EPI creatinine (2021) equation.         Lab Results   Component Value Date    SEDRATE 13 04/14/2025      Lab Results   Component Value Date    CRP 4.90 04/14/2025          Assessment / Plan:   Left clavicular osteomyelitis in the setting of MSSA bacteremia with a left shoulder abscess and septic arthritis   Leukocytosis       Patient is noted with leukocytosis; WBC 12.3.  Inflammatory markers have  normalized  Renal indices are stable  Called and discussed findings with patient.  He reports he is doing well and is currently asymptomatic, besides pain to his shoulder which is not new.  He reports no missed doses of medication and well tolerance.  Denies fever, chills, night sweats, rash, HA, vision changes, dizziness, CP/SOB, cough, N/V/D, abdominal pain, or urinary complaints.  He reports his shoulder looks good and is not with any redness or purulent drainage.  Discussed with patient that if he begins to experience any symptoms to include fever, chills, night sweats, nausea, vomiting, etc. or if shoulder begins to look pad, then he needs to go to the emergency room.  Otherwise we will plan to re-evaluate on next set of labs.  Patient verbalized understanding.    Continue Ancef as directed        Follow up ID appointment:  04/23/2025        BRUCE Benjamin  [facility] Infectious Diseases    *Portions of this note dictated using EMR integrated voice recognition software, and may be subject to voice recognition errors not corrected at proofreading. Please contact writer for clarification if needed. *

## 2025-04-16 NOTE — PHYSICIAN QUERY
"Please clarify the term "postoperative" as it relates to the condition.  Clinically Undetermined    "

## 2025-04-21 ENCOUNTER — CLINICAL SUPPORT (OUTPATIENT)
Dept: INFECTIOUS DISEASES | Facility: CLINIC | Age: 60
End: 2025-04-21
Payer: MEDICAID

## 2025-04-21 DIAGNOSIS — M86.9 OSTEOMYELITIS, UNSPECIFIED SITE, UNSPECIFIED TYPE: Primary | ICD-10-CM

## 2025-04-21 LAB
ALBUMIN SERPL-MCNC: 3.8 G/DL (ref 3.5–5)
ALBUMIN/GLOB SERPL: 0.9 RATIO (ref 1.1–2)
ALP SERPL-CCNC: 112 UNIT/L (ref 40–150)
ALT SERPL-CCNC: 6 UNIT/L (ref 0–55)
ANION GAP SERPL CALC-SCNC: 7 MEQ/L
AST SERPL-CCNC: 14 UNIT/L (ref 11–45)
BASOPHILS # BLD AUTO: 0.03 X10(3)/MCL
BASOPHILS NFR BLD AUTO: 0.3 %
BILIRUB SERPL-MCNC: 0.4 MG/DL
BUN SERPL-MCNC: 15.4 MG/DL (ref 8.4–25.7)
CALCIUM SERPL-MCNC: 9.8 MG/DL (ref 8.4–10.2)
CHLORIDE SERPL-SCNC: 107 MMOL/L (ref 98–107)
CO2 SERPL-SCNC: 27 MMOL/L (ref 22–29)
CREAT SERPL-MCNC: 0.72 MG/DL (ref 0.72–1.25)
CREAT/UREA NIT SERPL: 21
CRP SERPL-MCNC: 14 MG/L
EOSINOPHIL # BLD AUTO: 0.31 X10(3)/MCL (ref 0–0.9)
EOSINOPHIL NFR BLD AUTO: 3.4 %
ERYTHROCYTE [DISTWIDTH] IN BLOOD BY AUTOMATED COUNT: 12.3 % (ref 11.5–17)
ERYTHROCYTE [SEDIMENTATION RATE] IN BLOOD: 39 MM/HR (ref 0–15)
GFR SERPLBLD CREATININE-BSD FMLA CKD-EPI: >60 ML/MIN/1.73/M2
GLOBULIN SER-MCNC: 4.2 GM/DL (ref 2.4–3.5)
GLUCOSE SERPL-MCNC: 89 MG/DL (ref 74–100)
HCT VFR BLD AUTO: 46.4 % (ref 42–52)
HGB BLD-MCNC: 15.2 G/DL (ref 14–18)
IMM GRANULOCYTES # BLD AUTO: 0.02 X10(3)/MCL (ref 0–0.04)
IMM GRANULOCYTES NFR BLD AUTO: 0.2 %
LYMPHOCYTES # BLD AUTO: 2.62 X10(3)/MCL (ref 0.6–4.6)
LYMPHOCYTES NFR BLD AUTO: 28.8 %
MCH RBC QN AUTO: 28.6 PG (ref 27–31)
MCHC RBC AUTO-ENTMCNC: 32.8 G/DL (ref 33–36)
MCV RBC AUTO: 87.2 FL (ref 80–94)
MONOCYTES # BLD AUTO: 1.04 X10(3)/MCL (ref 0.1–1.3)
MONOCYTES NFR BLD AUTO: 11.4 %
NEUTROPHILS # BLD AUTO: 5.09 X10(3)/MCL (ref 2.1–9.2)
NEUTROPHILS NFR BLD AUTO: 55.9 %
NRBC BLD AUTO-RTO: 0 %
PLATELET # BLD AUTO: 325 X10(3)/MCL (ref 130–400)
PMV BLD AUTO: 10.5 FL (ref 7.4–10.4)
POTASSIUM SERPL-SCNC: 4.2 MMOL/L (ref 3.5–5.1)
PROT SERPL-MCNC: 8 GM/DL (ref 6.4–8.3)
RBC # BLD AUTO: 5.32 X10(6)/MCL (ref 4.7–6.1)
SODIUM SERPL-SCNC: 141 MMOL/L (ref 136–145)
WBC # BLD AUTO: 9.11 X10(3)/MCL (ref 4.5–11.5)

## 2025-04-21 PROCEDURE — 86140 C-REACTIVE PROTEIN: CPT

## 2025-04-21 PROCEDURE — 80053 COMPREHEN METABOLIC PANEL: CPT

## 2025-04-21 PROCEDURE — 36415 COLL VENOUS BLD VENIPUNCTURE: CPT

## 2025-04-21 PROCEDURE — 85025 COMPLETE CBC W/AUTO DIFF WBC: CPT

## 2025-04-21 PROCEDURE — 99211 OFF/OP EST MAY X REQ PHY/QHP: CPT | Mod: PBBFAC

## 2025-04-21 PROCEDURE — 85652 RBC SED RATE AUTOMATED: CPT

## 2025-04-22 ENCOUNTER — DOCUMENTATION ONLY (OUTPATIENT)
Dept: INFECTIOUS DISEASES | Facility: HOSPITAL | Age: 60
End: 2025-04-22
Payer: MEDICAID

## 2025-04-22 NOTE — PROGRESS NOTES
[facility]  Infectious Diseases OPAT Lab Review Note      Medication:  Ancef 2 g IV q.8 hours to complete a 42 day course    Infusion Company: [employer]    Outpatient start date:   Outpatient stop date:  05/05/2025      Labs reviewed:     Lab Results   Component Value Date    WBC 9.11 04/21/2025    HGB 15.2 04/21/2025    HCT 46.4 04/21/2025    MCV 87.2 04/21/2025     04/21/2025      CMP  Sodium   Date Value Ref Range Status   04/21/2025 141 136 - 145 mmol/L Final     Potassium   Date Value Ref Range Status   04/21/2025 4.2 3.5 - 5.1 mmol/L Final     Chloride   Date Value Ref Range Status   04/21/2025 107 98 - 107 mmol/L Final     CO2   Date Value Ref Range Status   04/21/2025 27 22 - 29 mmol/L Final     Blood Urea Nitrogen   Date Value Ref Range Status   04/21/2025 15.4 8.4 - 25.7 mg/dL Final     Creatinine   Date Value Ref Range Status   04/21/2025 0.72 0.72 - 1.25 mg/dL Final     Calcium   Date Value Ref Range Status   04/21/2025 9.8 8.4 - 10.2 mg/dL Final     Albumin   Date Value Ref Range Status   04/21/2025 3.8 3.5 - 5.0 g/dL Final     Bilirubin Total   Date Value Ref Range Status   04/21/2025 0.4 <=1.5 mg/dL Final     ALP   Date Value Ref Range Status   04/21/2025 112 40 - 150 unit/L Final     AST   Date Value Ref Range Status   04/21/2025 14 11 - 45 unit/L Final     ALT   Date Value Ref Range Status   04/21/2025 6 0 - 55 unit/L Final     eGFR   Date Value Ref Range Status   04/21/2025 >60 mL/min/1.73/m2 Final     Comment:     Estimated GFR calculated using the CKD-EPI creatinine (2021) equation.         Lab Results   Component Value Date    SEDRATE 39 (H) 04/21/2025      Lab Results   Component Value Date    CRP 14.00 (H) 04/21/2025          Assessment / Plan:   Left clavicular osteomyelitis in the setting of MSSA bacteremia with a left shoulder abscess and septic arthritis   Leukocytosis       Leukocytosis resolved; WBC 9.11.  Inflammatory markers are noted with  increase.  Will continue to monitor and trend  Renal indices are stable  Continue Ancef as directed        Follow up ID appointment:  04/23/2025        BRUCE Benjamin  [facility] Infectious Diseases    *Portions of this note dictated using EMR integrated voice recognition software, and may be subject to voice recognition errors not corrected at proofreading. Please contact writer for clarification if needed. *

## 2025-04-23 ENCOUNTER — OFFICE VISIT (OUTPATIENT)
Dept: INFECTIOUS DISEASES | Facility: CLINIC | Age: 60
End: 2025-04-23
Payer: MEDICAID

## 2025-04-23 DIAGNOSIS — Z98.890 S/P DEBRIDEMENT: ICD-10-CM

## 2025-04-23 DIAGNOSIS — M86.012 ACUTE HEMATOGENOUS OSTEOMYELITIS OF LEFT SHOULDER REGION: ICD-10-CM

## 2025-04-23 DIAGNOSIS — M00.012 STAPHYLOCOCCAL ARTHRITIS OF LEFT SHOULDER: ICD-10-CM

## 2025-04-23 DIAGNOSIS — L02.419 ABSCESS OF SHOULDER: ICD-10-CM

## 2025-04-23 DIAGNOSIS — B95.61 BACTEREMIA DUE TO METHICILLIN SUSCEPTIBLE STAPHYLOCOCCUS AUREUS (MSSA): ICD-10-CM

## 2025-04-23 DIAGNOSIS — M86.9 OSTEOMYELITIS OF CLAVICLE: Primary | ICD-10-CM

## 2025-04-23 DIAGNOSIS — Z79.2 RECEIVING INTRAVENOUS ANTIBIOTIC TREATMENT AS OUTPATIENT: ICD-10-CM

## 2025-04-23 DIAGNOSIS — R78.81 BACTEREMIA DUE TO METHICILLIN SUSCEPTIBLE STAPHYLOCOCCUS AUREUS (MSSA): ICD-10-CM

## 2025-04-23 DIAGNOSIS — Z23 NEED FOR VACCINATION: ICD-10-CM

## 2025-04-23 DIAGNOSIS — Z98.890 STATUS POST INCISION AND DRAINAGE: ICD-10-CM

## 2025-04-23 PROCEDURE — 1160F RVW MEDS BY RX/DR IN RCRD: CPT | Mod: CPTII,95,, | Performed by: NURSE PRACTITIONER

## 2025-04-23 PROCEDURE — 1159F MED LIST DOCD IN RCRD: CPT | Mod: CPTII,95,, | Performed by: NURSE PRACTITIONER

## 2025-04-23 PROCEDURE — 98006 SYNCH AUDIO-VIDEO EST MOD 30: CPT | Mod: 95,,, | Performed by: NURSE PRACTITIONER

## 2025-04-23 RX ORDER — TRAMADOL HYDROCHLORIDE 50 MG/1
50 TABLET ORAL EVERY 8 HOURS PRN
Qty: 28 TABLET | Refills: 0 | Status: SHIPPED | OUTPATIENT
Start: 2025-04-23 | End: 2025-05-14

## 2025-04-23 RX ORDER — NITROGLYCERIN 0.4 MG/1
TABLET SUBLINGUAL
COMMUNITY

## 2025-04-23 RX ORDER — SILDENAFIL 50 MG/1
TABLET, FILM COATED ORAL
COMMUNITY

## 2025-04-23 RX ORDER — METOPROLOL TARTRATE 25 MG/1
0.5 TABLET, FILM COATED ORAL DAILY
COMMUNITY

## 2025-04-23 NOTE — PROGRESS NOTES
Subjective     Patient ID: Kimani Redd is a 59 y.o. male.    Chief Complaint: Followup MSSA of shoulder, OPAT (States doing okay)    The patient location is: [place]   The chief complaint leading to consultation is: Left clavicle osteo/shoulder abscess/OPAT  Visit type: audiovisual  Face to Face time with patient: 8 minutes  35 minutes of total time spent on the encounter, which includes face to face time and non-face to face time preparing to see the patient (eg, review of tests), Obtaining and/or reviewing separately obtained history, Documenting clinical information in the electronic or other health record, Independently interpreting results (not separately reported) and communicating results to the patient/family/caregiver, or Care coordination (not separately reported).   Each patient to whom he or she provides medical services by telemedicine is:  (1) informed of the relationship between the physician and patient and the respective role of any other health care provider with respect to management of the patient; and (2) notified that he or she may decline to receive medical services by telemedicine and may withdraw from such care at any time.        Notes: Mr Harman is a 59 yr old WM who presents for initial post elizabeth ID follow up visit.  He is currently on OPAT with Ancef for a 42 day course due to left clavicular osteomyelitis with history of left shoulder abscess / septic arthritis in the setting of MSSA bacteremia.  Left clavicle debridement with further I&D of abscess on 03/25/2025.  Intraoperative cultures with MSSA also.  Current OPAT end date planned for 05/05/2025.  Patient reports he is doing well with the medication and reports no missed doses.  He states he feels fine.  Denies fever, chills, night sweats, rash, HA, vision changes, dizziness, CP/SOB, cough, N/V/D, abdominal pain, or urinary complaints.  Last labs with no leukocytosis, but inflammatory markers were slightly elevated.  Patient  reports shoulder wound has now healed and is unable to be packed as it is too shallow.  States there is just a scab covering area.  Patient saw Ortho last week and states he was pleased with shoulder / clavicle progress.  Patient reports he was denies from Medicaid / free care.  He is due for flu vaccine, but declines.  He is also due for Tdap and Pneumococcal vaccine which he will consider getting at local pharmacy.      04/04/2025  Inpatient HPI  Mr Redd is a 59 yr old WM with past medical history that is significant for hypertension, hyperlipidemia, coronary artery disease, and carotid stenosis who [place] [facility] [place] as he currently has no health care insurance.  Patient had a left shoulder abscess and was found to be bacteremic with MSSA.  Patient underwent a bedside I&D of his left shoulder on 03/15/2025 which showed growth of MSSA also.  Blood cultures were shown to clear as of 03/17/2025.  SHRUTHI was performed on 03/20/2025 showed no concerns for valvular vegetations.  PICC was placed on 03/20/2025 which is after clearance of blood cultures.  MRI of left shoulder was done on 03/21/2025 and showed left shoulder AC joint septic arthritis with osteomyelitis of the distal clavicle and acromion.  Patient was seen by orthopedic surgery and underwent further left clavicle debridement in which purulence was encountered and the left shoulder abscess was further addressed on 03/25/2025.  Intraoperative cultures once again showed MSSA.  Patient was transitioned to antibiotic therapy with Cefazolin on 03/17/2025.  He currently has plans for 42 days of IV antibiotics with Ancef in which originally end date was planned for 05/02/2025, but will extend therapy until 05/05/2025 starting from intraoperative source control on 03/25/2025 as additional purulence was encountered at this time and addressed.  ID has been consulted for antibiotic recommendations for osteomyelitis.  Upon  arrival, patient was found to be afebrile with leukocytosis at 11.7 which now has now resolved.  Blood cultures x2 have been repeated and remain pending.  Routine screening for hepatitis/gonorrhea/chlamydia are all negative.  Upon exam, patient has a dressing to his left shoulder and reports pain.  He is also noted with a PICC to the right upper arm which is currently dislodged and we will need to be replaced.  Patient lives with wife in [place], [place].  Reports he works in construction.  Denies tobacco, alcohol, or illicit drug use to include IVDU.      Review of Systems   Constitutional:  Negative for chills, diaphoresis, fatigue, fever and night sweats.   HENT:  Negative for nasal congestion, dental problem, ear pain, facial swelling, hearing loss and sore throat.    Eyes:  Negative for photophobia, pain, redness and visual disturbance.   Respiratory:  Negative for cough, chest tightness and shortness of breath.    Cardiovascular:  Negative for chest pain, palpitations and leg swelling.   Gastrointestinal:  Negative for abdominal pain, constipation, diarrhea, nausea and vomiting.   Endocrine: Negative for cold intolerance, heat intolerance, polydipsia, polyphagia and polyuria.   Genitourinary:  Negative for dysuria, frequency, genital sores, hematuria and urgency.   Musculoskeletal:  Negative for back pain, joint swelling, leg pain, neck pain and neck stiffness.        Shoulder pain / tenderness   Reports wound closed; only has a scab   Integumentary:  Negative for rash and wound.   Neurological:  Negative for dizziness, seizures, weakness, numbness and headaches.   Psychiatric/Behavioral:  Negative for confusion and hallucinations.           Objective     Physical Exam  Vitals reviewed.   Constitutional:       General: He is awake. He is not in acute distress.     Appearance: Normal appearance. He is normal weight. He is not ill-appearing, toxic-appearing or diaphoretic.      Comments: Assessment  limited d/t virtual video visit   HENT:      Head: Normocephalic and atraumatic.      Nose: Nose normal.      Mouth/Throat:      Mouth: Mucous membranes are moist.      Pharynx: No oropharyngeal exudate or posterior oropharyngeal erythema.      Comments: No thrush  Eyes:      General: No scleral icterus.     Conjunctiva/sclera: Conjunctivae normal.      Pupils: Pupils are equal, round, and reactive to light.   Neck:      Comments: No JVD noted  Pulmonary:      Effort: Pulmonary effort is normal. No respiratory distress.      Breath sounds: Normal breath sounds. No wheezing.   Abdominal:      General: Abdomen is flat. There is no distension.      Palpations: Abdomen is soft.      Tenderness: There is no abdominal tenderness. There is no guarding.   Musculoskeletal:         General: No swelling, deformity or signs of injury. Normal range of motion.      Cervical back: Normal range of motion and neck supple. No rigidity.      Right lower leg: No edema.      Left lower leg: No edema.   Skin:     General: Skin is warm and dry.      Capillary Refill: Capillary refill takes less than 2 seconds.      Coloration: Skin is not jaundiced or pale.      Findings: No bruising, erythema, lesion or rash.      Comments: RT upper arm PICC without s/s infection    Neurological:      General: No focal deficit present.      Mental Status: He is alert and oriented to person, place, and time. Mental status is at baseline.   Psychiatric:         Mood and Affect: Mood normal.         Behavior: Behavior normal.         Thought Content: Thought content normal.         Judgment: Judgment normal.       Lab Results   Component Value Date    WBC 9.11 04/21/2025    HGB 15.2 04/21/2025    HCT 46.4 04/21/2025    MCV 87.2 04/21/2025     04/21/2025     CMP  Sodium   Date Value Ref Range Status   04/21/2025 141 136 - 145 mmol/L Final     Potassium   Date Value Ref Range Status   04/21/2025 4.2 3.5 - 5.1 mmol/L Final     Chloride   Date Value Ref  Range Status   04/21/2025 107 98 - 107 mmol/L Final     CO2   Date Value Ref Range Status   04/21/2025 27 22 - 29 mmol/L Final     Blood Urea Nitrogen   Date Value Ref Range Status   04/21/2025 15.4 8.4 - 25.7 mg/dL Final     Creatinine   Date Value Ref Range Status   04/21/2025 0.72 0.72 - 1.25 mg/dL Final     Calcium   Date Value Ref Range Status   04/21/2025 9.8 8.4 - 10.2 mg/dL Final     Albumin   Date Value Ref Range Status   04/21/2025 3.8 3.5 - 5.0 g/dL Final     Bilirubin Total   Date Value Ref Range Status   04/21/2025 0.4 <=1.5 mg/dL Final     ALP   Date Value Ref Range Status   04/21/2025 112 40 - 150 unit/L Final     AST   Date Value Ref Range Status   04/21/2025 14 11 - 45 unit/L Final     ALT   Date Value Ref Range Status   04/21/2025 6 0 - 55 unit/L Final     eGFR   Date Value Ref Range Status   04/21/2025 >60 mL/min/1.73/m2 Final     Comment:     Estimated GFR calculated using the CKD-EPI creatinine (2021) equation.     Lab Results   Component Value Date    SEDRATE 39 (H) 04/21/2025     Lab Results   Component Value Date    CRP 14.00 (H) 04/21/2025          Assessment and Plan     1. Osteomyelitis of clavicle  MRI of left shoulder was done on 03/21/2025 and showed left shoulder AC joint septic arthritis with osteomyelitis of the distal clavicle and acromion  Patient to continue IV antibiotics with Ancef to complete a 42 day course (MSSA)  No antibiotic suppression is needed after IV completion as there is no open wound  Weekly labs to monitor  Patient will notify clinic of any changes of shoulder / clavicle status  Keep Ortho appointment as scheduled   RTC PRN     2. Abscess of shoulder    3. Septic arthritis of left shoulder (MSSA)    4. Status post incision and drainage  Patient underwent a bedside I&D of his left shoulder on 03/15/2025 which showed growth of MSSA  Patient was seen by orthopedic surgery and underwent further left clavicle debridement in which purulence was encountered and the left  shoulder abscess was further addressed on 03/25/2025.  Intraoperative cultures once again showed MSSA    5. S/P debridement  Patient was seen by orthopedic surgery and underwent further left clavicle debridement in which purulence was encountered and the left shoulder abscess was further addressed on 03/25/2025.  Intraoperative cultures once again showed MSSA    6. Bacteremia due to methicillin susceptible Staphylococcus aureus (MSSA)  Blood cultures were shown to clear as of 03/17/2025.  SHRUTHI was performed on 03/20/2025 showed no concerns for valvular vegetations.    7. Receiving intravenous antibiotic treatment as outpatient  Continue Ancef 2 grams IV q 8 hours to complete a 42 day course.  End date planned for 05/05/2025  Weekly labs to monitor    8. Need for vaccination  Patient is due for flu vaccine, but declines.  He is also due for Tdap and Pneumococcal vaccine which he will consider getting at local pharmacy.          BRUCE Benjamin  [facility] Infectious Diseases          Follow up if symptoms worsen or fail to improve.

## 2025-04-28 ENCOUNTER — CLINICAL SUPPORT (OUTPATIENT)
Dept: INFECTIOUS DISEASES | Facility: CLINIC | Age: 60
End: 2025-04-28
Payer: MEDICAID

## 2025-04-28 DIAGNOSIS — M86.9 OSTEOMYELITIS OF CLAVICLE: Primary | ICD-10-CM

## 2025-04-28 LAB
ALBUMIN SERPL-MCNC: 3.6 G/DL (ref 3.5–5)
ALBUMIN/GLOB SERPL: 0.9 RATIO (ref 1.1–2)
ALP SERPL-CCNC: 96 UNIT/L (ref 40–150)
ALT SERPL-CCNC: <5 UNIT/L (ref 0–55)
ANION GAP SERPL CALC-SCNC: 8 MEQ/L
AST SERPL-CCNC: 12 UNIT/L (ref 11–45)
BASOPHILS # BLD AUTO: 0.03 X10(3)/MCL
BASOPHILS NFR BLD AUTO: 0.3 %
BILIRUB SERPL-MCNC: 0.4 MG/DL
BUN SERPL-MCNC: 14.1 MG/DL (ref 8.4–25.7)
CALCIUM SERPL-MCNC: 9.3 MG/DL (ref 8.4–10.2)
CHLORIDE SERPL-SCNC: 108 MMOL/L (ref 98–107)
CO2 SERPL-SCNC: 25 MMOL/L (ref 22–29)
CREAT SERPL-MCNC: 0.67 MG/DL (ref 0.72–1.25)
CREAT/UREA NIT SERPL: 21
CRP SERPL-MCNC: 7.8 MG/L
EOSINOPHIL # BLD AUTO: 0.27 X10(3)/MCL (ref 0–0.9)
EOSINOPHIL NFR BLD AUTO: 2.7 %
ERYTHROCYTE [DISTWIDTH] IN BLOOD BY AUTOMATED COUNT: 12.2 % (ref 11.5–17)
ERYTHROCYTE [SEDIMENTATION RATE] IN BLOOD: 23 MM/HR (ref 0–15)
GFR SERPLBLD CREATININE-BSD FMLA CKD-EPI: >60 ML/MIN/1.73/M2
GLOBULIN SER-MCNC: 3.8 GM/DL (ref 2.4–3.5)
GLUCOSE SERPL-MCNC: 87 MG/DL (ref 74–100)
HCT VFR BLD AUTO: 43.1 % (ref 42–52)
HGB BLD-MCNC: 14.3 G/DL (ref 14–18)
IMM GRANULOCYTES # BLD AUTO: 0.03 X10(3)/MCL (ref 0–0.04)
IMM GRANULOCYTES NFR BLD AUTO: 0.3 %
LYMPHOCYTES # BLD AUTO: 3.45 X10(3)/MCL (ref 0.6–4.6)
LYMPHOCYTES NFR BLD AUTO: 35 %
MCH RBC QN AUTO: 29.5 PG (ref 27–31)
MCHC RBC AUTO-ENTMCNC: 33.2 G/DL (ref 33–36)
MCV RBC AUTO: 88.9 FL (ref 80–94)
MONOCYTES # BLD AUTO: 0.78 X10(3)/MCL (ref 0.1–1.3)
MONOCYTES NFR BLD AUTO: 7.9 %
NEUTROPHILS # BLD AUTO: 5.31 X10(3)/MCL (ref 2.1–9.2)
NEUTROPHILS NFR BLD AUTO: 53.8 %
NRBC BLD AUTO-RTO: 0 %
PLATELET # BLD AUTO: 354 X10(3)/MCL (ref 130–400)
PMV BLD AUTO: 10.6 FL (ref 7.4–10.4)
POTASSIUM SERPL-SCNC: 4.3 MMOL/L (ref 3.5–5.1)
PROT SERPL-MCNC: 7.4 GM/DL (ref 6.4–8.3)
RBC # BLD AUTO: 4.85 X10(6)/MCL (ref 4.7–6.1)
SODIUM SERPL-SCNC: 141 MMOL/L (ref 136–145)
WBC # BLD AUTO: 9.87 X10(3)/MCL (ref 4.5–11.5)

## 2025-04-28 PROCEDURE — 85025 COMPLETE CBC W/AUTO DIFF WBC: CPT

## 2025-04-28 PROCEDURE — 86140 C-REACTIVE PROTEIN: CPT

## 2025-04-28 PROCEDURE — 85652 RBC SED RATE AUTOMATED: CPT

## 2025-04-28 PROCEDURE — 80053 COMPREHEN METABOLIC PANEL: CPT

## 2025-04-29 ENCOUNTER — DOCUMENTATION ONLY (OUTPATIENT)
Dept: INFECTIOUS DISEASES | Facility: HOSPITAL | Age: 60
End: 2025-04-29
Payer: MEDICAID

## 2025-04-29 NOTE — PROGRESS NOTES
[facility]  Infectious Diseases OPAT Lab Review Note      Medication:  Ancef 2 g IV q.8 hours to complete a 42 day course    Infusion Company: [employer]    Outpatient start date:   Outpatient stop date:  05/05/2025      Labs reviewed:     Lab Results   Component Value Date    WBC 9.87 04/28/2025    HGB 14.3 04/28/2025    HCT 43.1 04/28/2025    MCV 88.9 04/28/2025     04/28/2025      CMP  Sodium   Date Value Ref Range Status   04/28/2025 141 136 - 145 mmol/L Final     Potassium   Date Value Ref Range Status   04/28/2025 4.3 3.5 - 5.1 mmol/L Final     Chloride   Date Value Ref Range Status   04/28/2025 108 (H) 98 - 107 mmol/L Final     CO2   Date Value Ref Range Status   04/28/2025 25 22 - 29 mmol/L Final     Glucose   Date Value Ref Range Status   04/28/2025 87 74 - 100 mg/dL Final     Blood Urea Nitrogen   Date Value Ref Range Status   04/28/2025 14.1 8.4 - 25.7 mg/dL Final     Creatinine   Date Value Ref Range Status   04/28/2025 0.67 (L) 0.72 - 1.25 mg/dL Final     Calcium   Date Value Ref Range Status   04/28/2025 9.3 8.4 - 10.2 mg/dL Final     Protein Total   Date Value Ref Range Status   04/28/2025 7.4 6.4 - 8.3 gm/dL Final     Albumin   Date Value Ref Range Status   04/28/2025 3.6 3.5 - 5.0 g/dL Final     Bilirubin Total   Date Value Ref Range Status   04/28/2025 0.4 <=1.5 mg/dL Final     ALP   Date Value Ref Range Status   04/28/2025 96 40 - 150 unit/L Final     AST   Date Value Ref Range Status   04/28/2025 12 11 - 45 unit/L Final     ALT   Date Value Ref Range Status   04/28/2025 <5 0 - 55 unit/L Final     eGFR   Date Value Ref Range Status   04/28/2025 >60 mL/min/1.73/m2 Final     Comment:     Estimated GFR calculated using the CKD-EPI creatinine (2021) equation.         Lab Results   Component Value Date    SEDRATE 23 (H) 04/28/2025      Lab Results   Component Value Date    CRP 7.80 (H) 04/28/2025          Assessment / Plan:   Left clavicular osteomyelitis in  the setting of MSSA bacteremia with a left shoulder abscess and septic arthritis   Leukocytosis       No leukocytosis is noted.  Inflammatory markers are downtrending  Renal indices are stable  Continue Ancef as directed        Follow up ID appointment:  BRUCE Billingsley  [facility] Infectious Diseases    *Portions of this note dictated using EMR integrated voice recognition software, and may be subject to voice recognition errors not corrected at proofreading. Please contact writer for clarification if needed. *

## 2025-04-30 DIAGNOSIS — M86.012 ACUTE HEMATOGENOUS OSTEOMYELITIS OF LEFT SHOULDER REGION: ICD-10-CM

## 2025-04-30 RX ORDER — TRAMADOL HYDROCHLORIDE 50 MG/1
50 TABLET ORAL EVERY 8 HOURS PRN
Qty: 21 TABLET | Refills: 0 | Status: SHIPPED | OUTPATIENT
Start: 2025-04-30 | End: 2025-05-07

## 2025-05-05 ENCOUNTER — TELEPHONE (OUTPATIENT)
Dept: INFECTIOUS DISEASES | Facility: CLINIC | Age: 60
End: 2025-05-05

## 2025-05-05 ENCOUNTER — CLINICAL SUPPORT (OUTPATIENT)
Dept: INFECTIOUS DISEASES | Facility: CLINIC | Age: 60
End: 2025-05-05

## 2025-05-05 ENCOUNTER — DOCUMENTATION ONLY (OUTPATIENT)
Dept: INFECTIOUS DISEASES | Facility: HOSPITAL | Age: 60
End: 2025-05-05

## 2025-05-05 DIAGNOSIS — M86.9 OSTEOMYELITIS OF CLAVICLE: Primary | ICD-10-CM

## 2025-05-05 DIAGNOSIS — Z79.2 RECEIVING INTRAVENOUS ANTIBIOTIC TREATMENT AS OUTPATIENT: ICD-10-CM

## 2025-05-05 DIAGNOSIS — R79.82 CRP ELEVATED: ICD-10-CM

## 2025-05-05 DIAGNOSIS — Z79.2 RECEIVING INTRAVENOUS ANTIBIOTIC TREATMENT AS OUTPATIENT: Primary | ICD-10-CM

## 2025-05-05 DIAGNOSIS — M86.9 OSTEOMYELITIS OF CLAVICLE: ICD-10-CM

## 2025-05-05 LAB
ALBUMIN SERPL-MCNC: 3.8 G/DL (ref 3.5–5)
ALBUMIN/GLOB SERPL: 1 RATIO (ref 1.1–2)
ALP SERPL-CCNC: 105 UNIT/L (ref 40–150)
ALT SERPL-CCNC: 5 UNIT/L (ref 0–55)
ANION GAP SERPL CALC-SCNC: 9 MEQ/L
AST SERPL-CCNC: 13 UNIT/L (ref 11–45)
BASOPHILS # BLD AUTO: 0.04 X10(3)/MCL
BASOPHILS NFR BLD AUTO: 0.4 %
BILIRUB SERPL-MCNC: 0.3 MG/DL
BUN SERPL-MCNC: 14.8 MG/DL (ref 8.4–25.7)
CALCIUM SERPL-MCNC: 9.5 MG/DL (ref 8.4–10.2)
CHLORIDE SERPL-SCNC: 105 MMOL/L (ref 98–107)
CO2 SERPL-SCNC: 26 MMOL/L (ref 22–29)
CREAT SERPL-MCNC: 0.75 MG/DL (ref 0.72–1.25)
CREAT/UREA NIT SERPL: 20
CRP SERPL-MCNC: 12 MG/L
EOSINOPHIL # BLD AUTO: 0.11 X10(3)/MCL (ref 0–0.9)
EOSINOPHIL NFR BLD AUTO: 1.1 %
ERYTHROCYTE [DISTWIDTH] IN BLOOD BY AUTOMATED COUNT: 12.4 % (ref 11.5–17)
ERYTHROCYTE [SEDIMENTATION RATE] IN BLOOD: 32 MM/HR (ref 0–15)
GFR SERPLBLD CREATININE-BSD FMLA CKD-EPI: >60 ML/MIN/1.73/M2
GLOBULIN SER-MCNC: 4 GM/DL (ref 2.4–3.5)
GLUCOSE SERPL-MCNC: 80 MG/DL (ref 74–100)
HCT VFR BLD AUTO: 45.7 % (ref 42–52)
HGB BLD-MCNC: 15.4 G/DL (ref 14–18)
IMM GRANULOCYTES # BLD AUTO: 0.03 X10(3)/MCL (ref 0–0.04)
IMM GRANULOCYTES NFR BLD AUTO: 0.3 %
LYMPHOCYTES # BLD AUTO: 1.96 X10(3)/MCL (ref 0.6–4.6)
LYMPHOCYTES NFR BLD AUTO: 20.1 %
MCH RBC QN AUTO: 30 PG (ref 27–31)
MCHC RBC AUTO-ENTMCNC: 33.7 G/DL (ref 33–36)
MCV RBC AUTO: 88.9 FL (ref 80–94)
MONOCYTES # BLD AUTO: 0.83 X10(3)/MCL (ref 0.1–1.3)
MONOCYTES NFR BLD AUTO: 8.5 %
NEUTROPHILS # BLD AUTO: 6.79 X10(3)/MCL (ref 2.1–9.2)
NEUTROPHILS NFR BLD AUTO: 69.6 %
NRBC BLD AUTO-RTO: 0 %
PLATELET # BLD AUTO: 344 X10(3)/MCL (ref 130–400)
PMV BLD AUTO: 10.4 FL (ref 7.4–10.4)
POTASSIUM SERPL-SCNC: 4 MMOL/L (ref 3.5–5.1)
PROT SERPL-MCNC: 7.8 GM/DL (ref 6.4–8.3)
RBC # BLD AUTO: 5.14 X10(6)/MCL (ref 4.7–6.1)
SODIUM SERPL-SCNC: 140 MMOL/L (ref 136–145)
WBC # BLD AUTO: 9.76 X10(3)/MCL (ref 4.5–11.5)

## 2025-05-05 PROCEDURE — 85025 COMPLETE CBC W/AUTO DIFF WBC: CPT

## 2025-05-05 PROCEDURE — 99211 OFF/OP EST MAY X REQ PHY/QHP: CPT | Mod: PBBFAC

## 2025-05-05 PROCEDURE — 80053 COMPREHEN METABOLIC PANEL: CPT

## 2025-05-05 PROCEDURE — 85652 RBC SED RATE AUTOMATED: CPT

## 2025-05-05 PROCEDURE — 86140 C-REACTIVE PROTEIN: CPT

## 2025-05-05 NOTE — PROGRESS NOTES
[facility]  Infectious Diseases OPAT Lab Review Note      Medication:  Ancef 2 g IV q.8 hours to complete a 42 day course    Infusion Company: [employer]    Outpatient start date:   Outpatient stop date:  05/05/2025      Labs reviewed:     Lab Results   Component Value Date    WBC 9.76 05/05/2025    HGB 15.4 05/05/2025    HCT 45.7 05/05/2025    MCV 88.9 05/05/2025     05/05/2025      CMP  Sodium   Date Value Ref Range Status   05/05/2025 140 136 - 145 mmol/L Final     Potassium   Date Value Ref Range Status   05/05/2025 4.0 3.5 - 5.1 mmol/L Final     Chloride   Date Value Ref Range Status   05/05/2025 105 98 - 107 mmol/L Final     CO2   Date Value Ref Range Status   05/05/2025 26 22 - 29 mmol/L Final     Glucose   Date Value Ref Range Status   05/05/2025 80 74 - 100 mg/dL Final     Blood Urea Nitrogen   Date Value Ref Range Status   05/05/2025 14.8 8.4 - 25.7 mg/dL Final     Creatinine   Date Value Ref Range Status   05/05/2025 0.75 0.72 - 1.25 mg/dL Final     Calcium   Date Value Ref Range Status   05/05/2025 9.5 8.4 - 10.2 mg/dL Final     Protein Total   Date Value Ref Range Status   05/05/2025 7.8 6.4 - 8.3 gm/dL Final     Albumin   Date Value Ref Range Status   05/05/2025 3.8 3.5 - 5.0 g/dL Final     Bilirubin Total   Date Value Ref Range Status   05/05/2025 0.3 <=1.5 mg/dL Final     ALP   Date Value Ref Range Status   05/05/2025 105 40 - 150 unit/L Final     AST   Date Value Ref Range Status   05/05/2025 13 11 - 45 unit/L Final     ALT   Date Value Ref Range Status   05/05/2025 5 0 - 55 unit/L Final     eGFR   Date Value Ref Range Status   05/05/2025 >60 mL/min/1.73/m2 Final     Comment:     Estimated GFR calculated using the CKD-EPI creatinine (2021) equation.         Lab Results   Component Value Date    SEDRATE 32 (H) 05/05/2025      Lab Results   Component Value Date    CRP 12.00 (H) 05/05/2025          Assessment / Plan:   Left clavicular osteomyelitis in the  setting of MSSA bacteremia with a left shoulder abscess and septic arthritis       No leukocytosis is noted.  Inflammatory markers are noted with slight increase but overall improved  Renal indices are stable  Continue Ancef as directed - today is last day of treatment  Okay to remove PICC following IV completion        Follow up ID appointment:  KAYA Billingsley-KAVIN  [facility] Infectious Diseases    *Portions of this note dictated using EMR integrated voice recognition software, and may be subject to voice recognition errors not corrected at proofreading. Please contact writer for clarification if needed. *

## 2025-05-05 NOTE — TELEPHONE ENCOUNTER
Patient should complete infusion today.  Okay to remove PICC line once removed.  Order has been put in.  Please assist in setting this up on 5th floor for tomorrow  Thank you

## 2025-05-06 ENCOUNTER — INFUSION (OUTPATIENT)
Dept: INFUSION THERAPY | Facility: HOSPITAL | Age: 60
End: 2025-05-06
Attending: INTERNAL MEDICINE

## 2025-05-06 VITALS
DIASTOLIC BLOOD PRESSURE: 79 MMHG | BODY MASS INDEX: 25.2 KG/M2 | OXYGEN SATURATION: 99 % | HEART RATE: 88 BPM | HEIGHT: 65 IN | RESPIRATION RATE: 20 BRPM | TEMPERATURE: 98 F | SYSTOLIC BLOOD PRESSURE: 124 MMHG | WEIGHT: 151.25 LBS

## 2025-05-06 DIAGNOSIS — Z79.2 RECEIVING INTRAVENOUS ANTIBIOTIC TREATMENT AS OUTPATIENT: ICD-10-CM

## 2025-05-06 DIAGNOSIS — M86.9 OSTEOMYELITIS OF CLAVICLE: ICD-10-CM

## 2025-05-06 NOTE — NURSING
0751  Pt arrived for removal of PICC line to rt upper arm.  Pt states he has completed 6 weeks of antibiotic trx and is ready to return to work.  PICC dressing has been reinforced by tape by pt due to it starting to unstick. 0801  PICC line removed using sterile technique, vaseling guaze, gauze, and tegaderm.  38 cm length of intact cath noted.  No bleeding or SE noted.  Pt requested an excuse for work.   Pt denies any pain or complaint.

## 2025-06-03 ENCOUNTER — TELEPHONE (OUTPATIENT)
Dept: FAMILY MEDICINE | Facility: CLINIC | Age: 60
End: 2025-06-03

## 2025-06-03 DIAGNOSIS — E78.5 HYPERLIPIDEMIA, UNSPECIFIED HYPERLIPIDEMIA TYPE: Primary | ICD-10-CM

## 2025-08-12 ENCOUNTER — PATIENT MESSAGE (OUTPATIENT)
Facility: CLINIC | Age: 60
End: 2025-08-12

## (undated) DEVICE — GOWN SMARTSLEEVE AAMI LVL4 XL

## (undated) DEVICE — TAPE SILK 3IN

## (undated) DEVICE — APPLICATOR CHLORAPREP ORN 26ML

## (undated) DEVICE — DRAPE STERI U-SHAPED 47X51IN

## (undated) DEVICE — GLOVE SIGNATURE MICRO LTX 6.5

## (undated) DEVICE — SPONGE GAUZE 4X4 12 PLY STRL

## (undated) DEVICE — GLOVE 8 PROTEXIS PI ORTHO

## (undated) DEVICE — Device

## (undated) DEVICE — GLOVE SENSICARE PI GRN 6.5

## (undated) DEVICE — SET CYSTO IRR DRP CHMBR 84IN

## (undated) DEVICE — COVER FULLGUARD SHOE HIGH-TOP

## (undated) DEVICE — PADDING 4X4YD SPECIALIST100

## (undated) DEVICE — ELECTRODE REM POLYHESIVE II

## (undated) DEVICE — XPERIENCE

## (undated) DEVICE — GLOVE PROTEXIS LTX MICRO 8

## (undated) DEVICE — DRAPE HAND STERILE

## (undated) DEVICE — BANDAGE COMPR VELCLOSE 4INX5YD

## (undated) DEVICE — DRESSING XEROFORM 5X9IN

## (undated) DEVICE — PAD CAST 6X4YD SPECIALISTIC